# Patient Record
Sex: MALE | Race: WHITE | NOT HISPANIC OR LATINO | Employment: OTHER | URBAN - METROPOLITAN AREA
[De-identification: names, ages, dates, MRNs, and addresses within clinical notes are randomized per-mention and may not be internally consistent; named-entity substitution may affect disease eponyms.]

---

## 2018-02-20 ENCOUNTER — OFFICE VISIT (OUTPATIENT)
Dept: FAMILY MEDICINE | Facility: CLINIC | Age: 33
End: 2018-02-20

## 2018-02-20 DIAGNOSIS — L28.1 PRURIGO NODULARIS: ICD-10-CM

## 2018-02-20 DIAGNOSIS — L30.9 DERMATITIS: Primary | ICD-10-CM

## 2018-02-20 DIAGNOSIS — L29.9 PRURITIC DISORDER: ICD-10-CM

## 2018-02-20 LAB
BASOPHILS # BLD AUTO: 0 10E9/L (ref 0–0.2)
BASOPHILS NFR BLD AUTO: 0.2 %
DIFFERENTIAL METHOD BLD: ABNORMAL
EOSINOPHIL # BLD AUTO: 0.4 10E9/L (ref 0–0.7)
EOSINOPHIL NFR BLD AUTO: 4.4 %
ERYTHROCYTE [DISTWIDTH] IN BLOOD BY AUTOMATED COUNT: 11.9 % (ref 10–15)
ERYTHROCYTE [SEDIMENTATION RATE] IN BLOOD BY WESTERGREN METHOD: 8 MM/H (ref 0–15)
HCT VFR BLD AUTO: 37.7 % (ref 40–53)
HGB BLD-MCNC: 12.5 G/DL (ref 13.3–17.7)
LYMPHOCYTES # BLD AUTO: 2.6 10E9/L (ref 0.8–5.3)
LYMPHOCYTES NFR BLD AUTO: 31 %
MCH RBC QN AUTO: 29.6 PG (ref 26.5–33)
MCHC RBC AUTO-ENTMCNC: 33.2 G/DL (ref 31.5–36.5)
MCV RBC AUTO: 89 FL (ref 78–100)
MONOCYTES # BLD AUTO: 0.5 10E9/L (ref 0–1.3)
MONOCYTES NFR BLD AUTO: 6.3 %
NEUTROPHILS # BLD AUTO: 4.8 10E9/L (ref 1.6–8.3)
NEUTROPHILS NFR BLD AUTO: 58.1 %
PLATELET # BLD AUTO: 222 10E9/L (ref 150–450)
RBC # BLD AUTO: 4.22 10E12/L (ref 4.4–5.9)
WBC # BLD AUTO: 8.3 10E9/L (ref 4–11)

## 2018-02-20 PROCEDURE — 87186 SC STD MICRODIL/AGAR DIL: CPT | Performed by: FAMILY MEDICINE

## 2018-02-20 PROCEDURE — 11101 HC BIOPSY SKIN/SUBQ/MUC MEM, SINGLE LESION: CPT | Performed by: FAMILY MEDICINE

## 2018-02-20 PROCEDURE — 99214 OFFICE O/P EST MOD 30 MIN: CPT | Mod: 25 | Performed by: FAMILY MEDICINE

## 2018-02-20 PROCEDURE — 87070 CULTURE OTHR SPECIMN AEROBIC: CPT | Performed by: FAMILY MEDICINE

## 2018-02-20 PROCEDURE — 86140 C-REACTIVE PROTEIN: CPT | Performed by: FAMILY MEDICINE

## 2018-02-20 PROCEDURE — 99000 SPECIMEN HANDLING OFFICE-LAB: CPT | Performed by: FAMILY MEDICINE

## 2018-02-20 PROCEDURE — 36415 COLL VENOUS BLD VENIPUNCTURE: CPT | Performed by: FAMILY MEDICINE

## 2018-02-20 PROCEDURE — 80050 GENERAL HEALTH PANEL: CPT | Performed by: FAMILY MEDICINE

## 2018-02-20 PROCEDURE — 85652 RBC SED RATE AUTOMATED: CPT | Performed by: FAMILY MEDICINE

## 2018-02-20 PROCEDURE — 87077 CULTURE AEROBIC IDENTIFY: CPT | Performed by: FAMILY MEDICINE

## 2018-02-20 PROCEDURE — 11100 HC BIOPSY SKIN/SUBQ/MUC MEM, EACH ADDTL LESION: CPT | Performed by: FAMILY MEDICINE

## 2018-02-20 PROCEDURE — 88305 TISSUE EXAM BY PATHOLOGIST: CPT | Mod: 90 | Performed by: FAMILY MEDICINE

## 2018-02-20 PROCEDURE — 86038 ANTINUCLEAR ANTIBODIES: CPT | Performed by: FAMILY MEDICINE

## 2018-02-20 RX ORDER — GABAPENTIN 300 MG/1
300 CAPSULE ORAL 3 TIMES DAILY
Status: CANCELLED | OUTPATIENT
Start: 2018-02-20

## 2018-02-20 RX ORDER — TRIAMCINOLONE ACETONIDE 1 MG/G
CREAM TOPICAL 2 TIMES DAILY
Qty: 80 G | Refills: 1 | Status: CANCELLED | OUTPATIENT
Start: 2018-02-20

## 2018-02-20 RX ORDER — PREDNISONE 20 MG/1
TABLET ORAL
Qty: 15 TABLET | Refills: 0 | Status: ON HOLD | OUTPATIENT
Start: 2018-02-20 | End: 2021-09-24

## 2018-02-20 RX ORDER — DOXYCYCLINE 100 MG/1
100 CAPSULE ORAL 2 TIMES DAILY
Qty: 60 CAPSULE | Refills: 0 | Status: SHIPPED | OUTPATIENT
Start: 2018-02-20 | End: 2018-03-22

## 2018-02-20 NOTE — PROGRESS NOTES
"[facility] - PRIMARY CARE SKIN    CC : sores  SUBJECTIVE:                                                    Tate Crocker is a 33 year old male who presents to clinic today because of sores beginning approximately 1.5 years ago on the inner thighs, now most prominent on the face and hands. Sores appear as small pimples or whiteheads at onset. He is a difficult historian .    Sores began to develop on the face at the end of summer 2016. Sores almost fully resolved \"99%\" in fall 2016 with decrease in stress levels.    Sores recurred in summer 2017 and have persisted. He reports continued increased stress levels. After picking at lesions and application of pressure to pop the lesions, there is increased pain, redness and swelling.    Pruritic : No itching, but he has picked at the lesions. Picking is most prominent when he has not slept regularly (when \"lesions have become inflamed\") , he starts picking as soon as he feels a red bump - he has slept 2 hours in the last 2 days. He reports that he will be able to avoid picking at lesions.  Symptoms appear to be : worsening.  Aggravating factors : stress levels.   Relieving factors : decreased stress.    Previous similar history : see above.  Recent exposure history : none known   Any other family members with similar symptoms : NO - wife reports similar lesions when using the same towels, but she is not currently symptomatic.    Therapies tried : OTC hydrocortisone at onset. A course of oral antibiotics for 10 or 30 days in summer 2017.    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 .    There is no problem list on file for this patient.      History reviewed. No pertinent past medical history. History reviewed. No pertinent " surgical history.   Social History   Substance Use Topics     Smoking status: Current Every Day Smoker     Smokeless tobacco: Never Used     Alcohol use No               Allergies   Allergen Reactions     Metal [Staples]      Rash         INTEGUMENTARY/SKIN: POSITIVE for non-healing lesions    ROS : 14 point review of systems was negative except the symptoms listed above in the HPI.    This document serves as a record of the services and decisions personally performed and made by Opal Jameson MD. It was created on her behalf by Nito Phillips, a trained medical scribe.  The creation of this document is based on the scribe's personal observations and the provider's statements to the medical scribe.  Nito Phillips, February 20, 2018 4:36 PM      OBJECTIVE:                                                    GENERAL: healthy, alert and no distress  SKIN: Guzman Skin Type - III.  Face, Neck, Trunk, Arms, Legs, Hands and Fingers were examined. The dermatoscope was used to help evaluate pigmented lesions.  Skin Pertinent Findings:  Hands : Multiple excoriated papules from mid-forearm extending down to dorsa of hands, but not on palmar surfaces.    Arms : Few scattered resolving excoriated papules on the mid-arm, upper arms relatively clear.    Trunk : Few scattered scaly papules on periumbilical area and mid-chest  Back : Relatively clear.    Ears : Dried excoriated lesions on both helices.    Legs : Multiple scattered excoriated papules more prominent on anterior thighs and lower legs than posterior. Sparing buttocks.    Face : Makeup occludes visual inspection, but multiple areas of similar excoriated papules visible underneath makeup on the forehead, cheeks and chin.    Feet : Multiple excoriated lesions on the dorsa of feet.    Right radial dorsal wrist : Small white papule.    Diagnostic Test Results:  No results found for this or any previous visit (from the past 24 hour(s)).    MDM :  The amount of excoriation is a  "strong contributing factor to the persistence of the condition.      ASSESSMENT:                                                      Encounter Diagnoses   Name Primary?     Dermatitis Yes     Pruritic disorder      Prurigo nodularis        PLAN:                                                    Patient Instructions     FUTURE APPOINTMENTS  Follow up in 7-10 days for Suture Removal and Tissue Pathology Review with Gisell Cohen.    Avoid picking at or scratching affected areas.    ORAL ANTIBIOTIC  Take by mouth 1 capsule/tablet of doxycycline 100 mg two time(s) a day for 30 days. Make sure to finish the entire antibiotic regimen.    TETRACYCLINE ANTIBIOTIC INFORMATION  Minocycline and doxycycline are tetracycline antibiotics and can increase your sun sensitivity, so make sure to be vigilant in regularly applying sunscreen. Also, avoid taking these with dairy products, as calcium can bind the antibiotic, making it unavailable to your body.    PREDNISONE 20 mg tablet TAPER INSTRUCTIONS (oral steroid):  Take by mouth at the same time every day and with food to prevent stomach upset. If it helps to remember, record these instructions in a frequently visible place.    Scheduled Day # Dosage   1-3 40 mg = 2 tablets   4-6 30 mg = 1.5 tablets   7-9 20 mg = 1 tablet   10-12 10 mg = 0.5 tablets     TOPICAL STEROID INSTRUCTIONS  Triamcinolone 0.1% cream.    Apply an amount equal to half of a fingertip (0.25 g) to the affected area(s) on the arms, legs, hands and feet, two times per day for 2 weeks.    \"Fingertip Amount\"      Not to be used on face or groin.    Topical steroid use is for short-term treatment only. If after initial treatment, you are using this for prolonged periods of time, return to clinic for re-evaluation of treatment.    Keep in mind to also regularly use moisturizer, as this preventative measure can help maintain your skin's natural moisture barrier.    WOUND CARE INSTRUCTIONS  1. Wash hands before every " "dressing change.  2. After 24 hours, change dressing daily.  3. Wash the wound area with a mild soap, then rinse.  4. Gently pat dry with a sterile gauze or Q-tip.  5. Using a Q-tip, apply Vaseline or Aquaphor only over entire wound. Do NOT use Neosporin - as many people react to neomycin.  6. Finally, cover with a bandage or sterile non-stick gauze with micropore paper tape.  7. Repeat once daily until wound has healed.    Do not let the wound dry out.  The wound will heal faster and with better cosmetic results if routinely kept moist. It is a false belief that wounds heal better when exposed to air and allowed to dry out.      Soap, water and shampoo will not hurt this area.    Do not go swimming or take baths, but showering is encouraged.    Limit use of the area where the procedure was done for a few days to allow for optimal healing.    If you experience bleeding:  Wash hands and hold firm pressure on the area for 10 minutes without checking to see if the bleeding has stopped. \"Checking\" pulls off the protective wound clot and restarts the bleeding all over again. Re-apply pressure for 10 minutes if necessary to stop bleeding.  Use additional sterile gauze and tape to maintain pressure once bleeding has stopped.  If bleeding continues, then call back to clinic at [phone].    Signs of Infection:  Infection can occur in any area where skin has been disrupted.  If you notice persistent redness, swelling, colored drainage, increasing pain, fever or other signs of infection, please call us at: [phone] and ask to have me or my colleague paged. We will call you back to discuss.    Pathology Results:  You will be notified, generally via letter or MyChart, in approximately 10 days. If there is anything we need to discuss or further treatment needed, I will call you to discuss it.    Skin tissue can be some of the most challenging tissue for pathologists to examine, therefore we may use a specialist " outside the [facility] system to read certain submitted tissue samples. When submitted to these outside specialists, [employer] will notify you that your tissue sample result has returned. However, this only means that the tissue sample has been sent to the specialist. These results are not available in [employer], so I will contact you when I receive the final report.    PATIENT INFORMATION : WOUNDS  During the healing process you will notice a number of changes. All wounds develop a small halo of redness surrounding the wound.  This means healing is occurring. Severe itching with extensive redness usually indicates sensitivity to the ointment or bandage tape used to dress the wound.  You should call our office if this develops.      Swelling  and/or discoloration around your surgical site is common, particularly when performed around the eye.    All wounds normally drain.  The larger the wound the more drainage there will be.  After 7-10 days, you will notice the wound beginning to shrink and new skin will begin to grow.  The wound is healed when you can see skin has formed over the entire area.  A healed wound has a healthy, shiny look to the surface and is red to dark pink in color to normalize.  Wounds may take approximately 4-6 weeks to heal.  Larger wounds may take 6-8 weeks. After the wound is healed you may discontinue dressing changes.    You may experience a sensation of tightness as your wound heals. This is normal and will gradually subside.    Your healed wound may be sensitive to temperature changes. This sensitivity improves with time, but if you re having a lot of discomfort, try to avoid temperature extremes.    Patients frequently experience itching after their wound appears to have healed because of the continue healing under the skin.  Plain Vaseline will help relieve the itching.    DRY SKIN MANAGEMENT INSTRUCTIONS  Routine use of moisturizer is important for healthy, resilient skin not just for soft  "skin.     Sealing in moisture    Twice daily use of a moisturizer such as over-the-counter (OTC) CeraVe moisturizer cream (in the jar). CeraVe products contain ceramides and filaggrin proteins that can help to maintain the body's moisture layer.    After cleansing or washing, always apply moisturizer immediately after drying off (pat dry only) for best effect.    Protection while hydrating    Do not overuse soap. Unless you have been sweating extensively, just apply soap to groin and armpits.    Recommended products for body include: OTC unscented Dove for sensitive skin or OTC Vanicream cleansing bar.    Recommended facial cleansers include: OTC CeraVe hydrating facial cleanser or OTC Cetaphil daily facial cleanser.    Avoid use of    Scented/perfumed products    Irritating clothing (wool, new jeans, new/unwashed clothing, scratchy synthetics)    Neosporin or triple antibiotic topical products    Products containing aloe, herbs, Vitamin E, or other \"natural ingredients\".    Dryer sheets or fabric softeners (while symptoms are present)    If a topical medication is prescribed, apply topical prescription first, followed by use of moisturizing product.    The patient was counseled to use products free of fragrance, dyes, and plants. The importance of using bland cleansers and the regular use of heavy bland emollient creams was impressed upon the patient.      PROCEDURES:                                                    Name : Punch Biopsy  Indication : Biopsy for pathology to evaluate tissue.  Location(s) : right radial dorsal forearm.  Completed by : Opal Jameson MD  Photo Taken : no.  Anesthesia : Patient was anesthetized by infiltrating the area surrounding the lesion with 1% lidocaine.   epinephrine [id number]:[id number] : Yes.  Buffered with bicarbonate : Yes.  Note : Discussed the risk of pain, infection, scarring, hypo- or hyperpigmentation and recurrence or need for re-treatment. The benefits of treatment and " alternative treatments were also discussed.    During this procedure, the universal protocol was utilized. The patient's identity was confirmed by no less than two patient identifiers, correct procedure was verified, correct site was verified and marked as applicable and a final pause was completed.    Sterile technique was used throughout the procedure. The skin was cleaned and prepped with surgical cleanser. Once adequate anesthesia was obtained, the lesion was biopsied using a 4 mm punch and appropriate skin traction. The specimen was sent to pathology.    Direct pressure was applied for hemostasis. The skin was closed with simple interrupted sutures of 4-0 Prolene. No bleeding was present upon the completion of the procedure. The wound was coated with antibacterial ointment. A dry sterile dressing was applied.    Name : Direct Immunofluorescence Pathology (DIF)  Note : A punch biopsy was performed with the technique above. A perilesional biopsy was performed using a 4 millimeter punch with appropriate skin traction. Direct pressure was applied for hemostasis. The specimen was sent to pathology. Both areas were incorporated into single defect.  Total number of stitches in closure of epidermis : 3    Primary provider and referring provider will be informed regarding the wound culture and tissue sample report when it returns.    Suture removal : 7-10 days.    CT: 40 min  TT: 25 min    The information in this document, created by the medical scribe for me, accurately reflects the services I personally performed and the decisions made by me. I have reviewed and approved this document for accuracy prior to leaving the patient care area.  Opal Jameson MD February 20, 2018 4:36 PM  [facility] [place]

## 2018-02-20 NOTE — MR AVS SNAPSHOT
"              After Visit Summary   2/20/2018    Tate Crocker    MRN: [id number]           Patient Information     Date Of Birth          1985        Visit Information        Provider Department      2/20/2018 4:20 PM Clarisa Jameson MD [facility] [place]        Today's Diagnoses     Dermatitis    -  1    Pruritic disorder        Prurigo nodularis          Care Instructions    FUTURE APPOINTMENTS  Follow up in 7-10 days for Suture Removal and Tissue Pathology Review with Gisell Cohen.    Avoid picking at or scratching affected areas.    ORAL ANTIBIOTIC  Take by mouth 1 capsule/tablet of doxycycline 100 mg two time(s) a day for 30 days. Make sure to finish the entire antibiotic regimen.    TETRACYCLINE ANTIBIOTIC INFORMATION  Minocycline and doxycycline are tetracycline antibiotics and can increase your sun sensitivity, so make sure to be vigilant in regularly applying sunscreen. Also, avoid taking these with dairy products, as calcium can bind the antibiotic, making it unavailable to your body.    PREDNISONE 20 mg tablet TAPER INSTRUCTIONS (oral steroid):  Take by mouth at the same time every day and with food to prevent stomach upset. If it helps to remember, record these instructions in a frequently visible place.    Scheduled Day # Dosage   1-3 40 mg = 2 tablets   4-6 30 mg = 1.5 tablets   7-9 20 mg = 1 tablet   10-12 10 mg = 0.5 tablets     TOPICAL STEROID INSTRUCTIONS  Triamcinolone 0.1% cream.    Apply an amount equal to half of a fingertip (0.25 g) to the affected area(s) on the arms, legs, hands and feet, two times per day for 2 weeks.    \"Fingertip Amount\"      Not to be used on face or groin.    Topical steroid use is for short-term treatment only. If after initial treatment, you are using this for prolonged periods of time, return to clinic for re-evaluation of treatment.    Keep in mind to also regularly use moisturizer, as this preventative measure can help maintain " "your skin's natural moisture barrier.    WOUND CARE INSTRUCTIONS  1. Wash hands before every dressing change.  2. After 24 hours, change dressing daily.  3. Wash the wound area with a mild soap, then rinse.  4. Gently pat dry with a sterile gauze or Q-tip.  5. Using a Q-tip, apply Vaseline or Aquaphor only over entire wound. Do NOT use Neosporin - as many people react to neomycin.  6. Finally, cover with a bandage or sterile non-stick gauze with micropore paper tape.  7. Repeat once daily until wound has healed.    Do not let the wound dry out.  The wound will heal faster and with better cosmetic results if routinely kept moist. It is a false belief that wounds heal better when exposed to air and allowed to dry out.      Soap, water and shampoo will not hurt this area.    Do not go swimming or take baths, but showering is encouraged.    Limit use of the area where the procedure was done for a few days to allow for optimal healing.    If you experience bleeding:  Wash hands and hold firm pressure on the area for 10 minutes without checking to see if the bleeding has stopped. \"Checking\" pulls off the protective wound clot and restarts the bleeding all over again. Re-apply pressure for 10 minutes if necessary to stop bleeding.  Use additional sterile gauze and tape to maintain pressure once bleeding has stopped.  If bleeding continues, then call back to clinic at [phone].    Signs of Infection:  Infection can occur in any area where skin has been disrupted.  If you notice persistent redness, swelling, colored drainage, increasing pain, fever or other signs of infection, please call us at: [phone] and ask to have me or my colleague paged. We will call you back to discuss.    Pathology Results:  You will be notified, generally via letter or MyChart, in approximately 10 days. If there is anything we need to discuss or further treatment needed, I will call you to discuss it.    Skin tissue can be some of the " most challenging tissue for pathologists to examine, therefore we may use a specialist outside the [facility] system to read certain submitted tissue samples. When submitted to these outside specialists, [facility] will notify you that your tissue sample result has returned. However, this only means that the tissue sample has been sent to the specialist. These results are not available in [facility], so I will contact you when I receive the final report.    PATIENT INFORMATION : WOUNDS  During the healing process you will notice a number of changes. All wounds develop a small halo of redness surrounding the wound.  This means healing is occurring. Severe itching with extensive redness usually indicates sensitivity to the ointment or bandage tape used to dress the wound.  You should call our office if this develops.      Swelling  and/or discoloration around your surgical site is common, particularly when performed around the eye.    All wounds normally drain.  The larger the wound the more drainage there will be.  After 7-10 days, you will notice the wound beginning to shrink and new skin will begin to grow.  The wound is healed when you can see skin has formed over the entire area.  A healed wound has a healthy, shiny look to the surface and is red to dark pink in color to normalize.  Wounds may take approximately 4-6 weeks to heal.  Larger wounds may take 6-8 weeks. After the wound is healed you may discontinue dressing changes.    You may experience a sensation of tightness as your wound heals. This is normal and will gradually subside.    Your healed wound may be sensitive to temperature changes. This sensitivity improves with time, but if you re having a lot of discomfort, try to avoid temperature extremes.    Patients frequently experience itching after their wound appears to have healed because of the continue healing under the skin.  Plain Vaseline will help relieve the itching.    DRY SKIN MANAGEMENT  "INSTRUCTIONS  Routine use of moisturizer is important for healthy, resilient skin not just for soft skin.     Sealing in moisture    Twice daily use of a moisturizer such as over-the-counter (OTC) CeraVe moisturizer cream (in the jar). CeraVe products contain ceramides and filaggrin proteins that can help to maintain the body's moisture layer.    After cleansing or washing, always apply moisturizer immediately after drying off (pat dry only) for best effect.    Protection while hydrating    Do not overuse soap. Unless you have been sweating extensively, just apply soap to groin and armpits.    Recommended products for body include: OTC unscented Dove for sensitive skin or OTC Vanicream cleansing bar.    Recommended facial cleansers include: OTC CeraVe hydrating facial cleanser or OTC Cetaphil daily facial cleanser.    Avoid use of    Scented/perfumed products    Irritating clothing (wool, new jeans, new/unwashed clothing, scratchy synthetics)    Neosporin or triple antibiotic topical products    Products containing aloe, herbs, Vitamin E, or other \"natural ingredients\".    Dryer sheets or fabric softeners (while symptoms are present)    If a topical medication is prescribed, apply topical prescription first, followed by use of moisturizing product.          Follow-ups after your visit        Who to contact     If you have questions or need follow up information about today's clinic visit or your schedule please contact [facility] [place] [facility] directly at [phone].  Normal or non-critical lab and imaging results will be communicated to you by MyChart, letter or phone within 4 business days after the clinic has received the results. If you do not hear from us within 7 days, please contact the clinic through [employer] [facility] or phone. If you have a critical or abnormal lab result, we will notify you by phone as soon as possible.  Submit refill requests through [employer] or call your pharmacy and they will forward the " "refill request to us. Please allow 3 business days for your refill to be completed.          Additional Information About Your Visit        MyChart Information     [employer] lets you send messages to your doctor, view your test results, renew your prescriptions, schedule appointments and more. To sign up, go to [email or web address] . Click on \"Log in\" on the left side of the screen, which will take you to the Welcome page. Then click on \"Sign up Now\" on the right side of the page.     You will be asked to enter the access code listed below, as well as some personal information. Please follow the directions to create your username and password.     Your access code is: [email or web address]  Expires: 2018  5:07 PM     Your access code will  in 90 days. If you need help or a new code, please call your [place] clinic or [phone].        Care EveryWhere ID     This is your Care EveryWhere ID. This could be used by other organizations to access your [place] medical records  [id number]         Blood Pressure from Last 3 Encounters:   No data found for BP    Weight from Last 3 Encounters:   No data found for Wt              Today, you had the following     No orders found for display       Primary Care Provider Fax #    Provider Not In System [phone]                Equal Access to Services     LISA PIZARRO : Hadii arron ku hadasho Soomaali, waaxda luqadaha, qaybta kaalmada adeegyada, hailey javier . So [facility] [phone].    ATENCIÓN: Si habla español, tiene a mccullough disposición servicios gratuitos de asistencia lingüística. Llame al [phone].    We comply with applicable federal civil rights laws and [place] laws. We do not discriminate on the basis of race, color, national origin, age, disability, sex, sexual orientation, or gender identity.            Thank you!     Thank you for choosing [facility]  for your care. Our goal is always to provide you with " excellent care. Hearing back from our patients is one way we can continue to improve our services. Please take a few minutes to complete the written survey that you may receive in the mail after your visit with us. Thank you!             Your Updated Medication List - Protect others around you: Learn how to safely use, store and throw away your medicines at www.disposemymeds.org.      Notice  As of 2/20/2018  5:07 PM    You have not been prescribed any medications.

## 2018-02-20 NOTE — LETTER
February 26, 2018    Tate Crocker  [id number]  [facility] [address]        Dear Tate,    This is a letter regarding your completed lab results. The tissue report is still pending. The wound culture did have a heavy growth of staph aureus and actinobacter therefore I have prescribed the oral antibiotic , trimethoprim sulfa. The trimethoprim sulfa has already been faxed to your preferred pharmacy.    Office Visit on 02/20/2018   Component Date Value Ref Range Status     WBC 02/20/2018 8.3  4.0 - 11.0 10e9/L Final     RBC Count 02/20/2018 4.22* 4.4 - 5.9 10e12/L Final     Hemoglobin 02/20/2018 12.5* 13.3 - 17.7 g/dL Final     Hematocrit 02/20/2018 37.7* 40.0 - 53.0 % Final     MCV 02/20/2018 89  78 - 100 fl Final     MCH 02/20/2018 29.6  26.5 - 33.0 pg Final     MCHC 02/20/2018 33.2  31.5 - 36.5 g/dL Final     RDW 02/20/2018 11.9  10.0 - 15.0 % Final     Platelet Count 02/20/2018 222  150 - 450 10e9/L Final     Diff Method 02/20/2018 Automated Method   Final     % Neutrophils 02/20/2018 58.1  % Final     % Lymphocytes 02/20/2018 31.0  % Final     % Monocytes 02/20/2018 6.3  % Final     % Eosinophils 02/20/2018 4.4  % Final     % Basophils 02/20/2018 0.2  % Final     Absolute Neutrophil 02/20/2018 4.8  1.6 - 8.3 10e9/L Final     Absolute Lymphocytes 02/20/2018 2.6  0.8 - 5.3 10e9/L Final     Absolute Monocytes 02/20/2018 0.5  0.0 - 1.3 10e9/L Final     Absolute Eosinophils 02/20/2018 0.4  0.0 - 0.7 10e9/L Final     Absolute Basophils 02/20/2018 0.0  0.0 - 0.2 10e9/L Final     Sodium 02/20/2018 134  133 - 144 mmol/L Final     Potassium 02/20/2018 3.8  3.4 - 5.3 mmol/L Final     Chloride 02/20/2018 100  94 - 109 mmol/L Final     Carbon Dioxide 02/20/2018 28  20 - 32 mmol/L Final     Anion Gap 02/20/2018 6  3 - 14 mmol/L Final     Glucose 02/20/2018 102* 70 - 99 mg/dL Final     Urea Nitrogen 02/20/2018 11  7 - 30 mg/dL Final     Creatinine 02/20/2018 0.95  0.66 - 1.25 mg/dL Final     GFR Estimate  02/20/2018 >90  >60 mL/min/1.7m2 Final     GFR Estimate If Black 02/20/2018 >90  >60 mL/min/1.7m2 Final     Calcium 02/20/2018 9.1  8.5 - 10.1 mg/dL Final     Bilirubin Total 02/20/2018 0.3  0.2 - 1.3 mg/dL Final     Albumin 02/20/2018 4.1  3.4 - 5.0 g/dL Final     Protein Total 02/20/2018 7.3  6.8 - 8.8 g/dL Final     Alkaline Phosphatase 02/20/2018 73  40 - 150 U/L Final     ALT 02/20/2018 26  0 - 70 U/L Final     AST 02/20/2018 24  0 - 45 U/L Final     Sed Rate 02/20/2018 8  0 - 15 mm/h Final     CRP Inflammation 02/20/2018 <2.9  0.0 - 8.0 mg/L Final     PAULETTE interpretation 02/20/2018 Negative  NEG^Negative Final     TSH 02/20/2018 1.84  0.40 - 4.00 mU/L Final     Specimen Description 02/20/2018 Right Arm   Final     Culture Micro 02/20/2018 *  Final                    Value:Heavy growth  Staphylococcus aureus       Culture Micro 02/20/2018 *  Final                    Value:Moderate growth  Acinetobacter species, not baumannii       Culture Micro 02/20/2018 *  Final                    Value:Moderate growth  Strain 2  Acinetobacter species, not baumannii       Miscellaneous Test 02/20/2018      Final                    Value:Specimen Received, Reordered and sent to Performing laboratory - Report to follow upon   completion.       Test Name 02/20/2018 DIRECT IMMUNOFLUORESCENCE   Final     Send Outs Misc Test Code 02/20/2018 DIF   Final     Send Outs Misc Test Specimen 02/20/2018 Tissue   Final     Location Performed 02/20/2018 [facility] DERMATOLOGY   Final     Result 02/20/2018 PENDING   Incomplete     Normal Range for Send Outs Misc Te* 02/20/2018 PENDING   Incomplete         Thank you for allowing me to be involved in your health care and for choosing [facility].  If you have any questions or concerns please feel free to contact me at [phone].      Sincerely,      Clarisa Jameson M.D.

## 2018-02-20 NOTE — NURSING NOTE
Chief Complaint   Patient presents with     New Patient     Derm Problem     Sores on hands, face, and thighs       Initial There were no vitals taken for this visit. There is no height or weight on file to calculate BMI.  Medication Reconciliation: complete     Sakshi Briggs MA

## 2018-02-20 NOTE — PATIENT INSTRUCTIONS
"FUTURE APPOINTMENTS  Follow up in 7-10 days for Suture Removal and Tissue Pathology Review with Gisell Cohen.    Avoid picking at or scratching affected areas.    ORAL ANTIBIOTIC  Take by mouth 1 capsule/tablet of doxycycline 100 mg two time(s) a day for 30 days. Make sure to finish the entire antibiotic regimen.    TETRACYCLINE ANTIBIOTIC INFORMATION  Minocycline and doxycycline are tetracycline antibiotics and can increase your sun sensitivity, so make sure to be vigilant in regularly applying sunscreen. Also, avoid taking these with dairy products, as calcium can bind the antibiotic, making it unavailable to your body.    PREDNISONE 20 mg tablet TAPER INSTRUCTIONS (oral steroid):  Take by mouth at the same time every day and with food to prevent stomach upset. If it helps to remember, record these instructions in a frequently visible place.    Scheduled Day # Dosage   1-3 40 mg = 2 tablets   4-6 30 mg = 1.5 tablets   7-9 20 mg = 1 tablet   10-12 10 mg = 0.5 tablets     TOPICAL STEROID INSTRUCTIONS  Triamcinolone 0.1% cream.    Apply an amount equal to half of a fingertip (0.25 g) to the affected area(s) on the arms, legs, hands and feet, two times per day for 2 weeks.    \"Fingertip Amount\"      Not to be used on face or groin.    Topical steroid use is for short-term treatment only. If after initial treatment, you are using this for prolonged periods of time, return to clinic for re-evaluation of treatment.    Keep in mind to also regularly use moisturizer, as this preventative measure can help maintain your skin's natural moisture barrier.    WOUND CARE INSTRUCTIONS  1. Wash hands before every dressing change.  2. After 24 hours, change dressing daily.  3. Wash the wound area with a mild soap, then rinse.  4. Gently pat dry with a sterile gauze or Q-tip.  5. Using a Q-tip, apply Vaseline or Aquaphor only over entire wound. Do NOT use Neosporin - as many people react to neomycin.  6. Finally, cover with a bandage " "or sterile non-stick gauze with micropore paper tape.  7. Repeat once daily until wound has healed.    Do not let the wound dry out.  The wound will heal faster and with better cosmetic results if routinely kept moist. It is a false belief that wounds heal better when exposed to air and allowed to dry out.      Soap, water and shampoo will not hurt this area.    Do not go swimming or take baths, but showering is encouraged.    Limit use of the area where the procedure was done for a few days to allow for optimal healing.    If you experience bleeding:  Wash hands and hold firm pressure on the area for 10 minutes without checking to see if the bleeding has stopped. \"Checking\" pulls off the protective wound clot and restarts the bleeding all over again. Re-apply pressure for 10 minutes if necessary to stop bleeding.  Use additional sterile gauze and tape to maintain pressure once bleeding has stopped.  If bleeding continues, then call back to clinic at [phone].    Signs of Infection:  Infection can occur in any area where skin has been disrupted.  If you notice persistent redness, swelling, colored drainage, increasing pain, fever or other signs of infection, please call us at: [phone] and ask to have me or my colleague paged. We will call you back to discuss.    Pathology Results:  You will be notified, generally via letter or [employer], in approximately 10 days. If there is anything we need to discuss or further treatment needed, I will call you to discuss it.    Skin tissue can be some of the most challenging tissue for pathologists to examine, therefore we may use a specialist outside the [facility] system to read certain submitted tissue samples. When submitted to these outside specialists, [employer] will notify you that your tissue sample result has returned. However, this only means that the tissue sample has been sent to the specialist. These results are not available in [employer], so I will contact you " when I receive the final report.    PATIENT INFORMATION : WOUNDS  During the healing process you will notice a number of changes. All wounds develop a small halo of redness surrounding the wound.  This means healing is occurring. Severe itching with extensive redness usually indicates sensitivity to the ointment or bandage tape used to dress the wound.  You should call our office if this develops.      Swelling  and/or discoloration around your surgical site is common, particularly when performed around the eye.    All wounds normally drain.  The larger the wound the more drainage there will be.  After 7-10 days, you will notice the wound beginning to shrink and new skin will begin to grow.  The wound is healed when you can see skin has formed over the entire area.  A healed wound has a healthy, shiny look to the surface and is red to dark pink in color to normalize.  Wounds may take approximately 4-6 weeks to heal.  Larger wounds may take 6-8 weeks. After the wound is healed you may discontinue dressing changes.    You may experience a sensation of tightness as your wound heals. This is normal and will gradually subside.    Your healed wound may be sensitive to temperature changes. This sensitivity improves with time, but if you re having a lot of discomfort, try to avoid temperature extremes.    Patients frequently experience itching after their wound appears to have healed because of the continue healing under the skin.  Plain Vaseline will help relieve the itching.    DRY SKIN MANAGEMENT INSTRUCTIONS  Routine use of moisturizer is important for healthy, resilient skin not just for soft skin.     Sealing in moisture    Twice daily use of a moisturizer such as over-the-counter (OTC) CeraVe moisturizer cream (in the jar). CeraVe products contain ceramides and filaggrin proteins that can help to maintain the body's moisture layer.    After cleansing or washing, always apply moisturizer immediately after drying off  "(pat dry only) for best effect.    Protection while hydrating    Do not overuse soap. Unless you have been sweating extensively, just apply soap to groin and armpits.    Recommended products for body include: OTC unscented Dove for sensitive skin or OTC Vanicream cleansing bar.    Recommended facial cleansers include: OTC CeraVe hydrating facial cleanser or OTC Cetaphil daily facial cleanser.    Avoid use of    Scented/perfumed products    Irritating clothing (wool, new jeans, new/unwashed clothing, scratchy synthetics)    Neosporin or triple antibiotic topical products    Products containing aloe, herbs, Vitamin E, or other \"natural ingredients\".    Dryer sheets or fabric softeners (while symptoms are present)    If a topical medication is prescribed, apply topical prescription first, followed by use of moisturizing product.  "

## 2018-02-20 NOTE — LETTER
"    2/20/2018         RE: Tate Crocker  [address]  [facility] [address]        Dear Colleague,    Thank you for referring your patient, Tate Crocker, to the [facility]. Please see a copy of my visit note below.    [facility] - PRIMARY CARE SKIN    CC : sores  SUBJECTIVE:                                                    Tate Crocker is a 33 year old male who presents to clinic today because of sores beginning approximately 1.5 years ago on the inner thighs, now most prominent on the face and hands. Sores appear as small pimples or whiteheads at onset. He is a difficult historian .    Sores began to develop on the face at the end of summer 2016. Sores almost fully resolved \"99%\" in fall 2016 with decrease in stress levels.    Sores recurred in summer 2017 and have persisted. He reports continued increased stress levels. After picking at lesions and application of pressure to pop the lesions, there is increased pain, redness and swelling.    Pruritic : No itching, but he has picked at the lesions. Picking is most prominent when he has not slept regularly (when \"lesions have become inflamed\") , he starts picking as soon as he feels a red bump - he has slept 2 hours in the last 2 days. He reports that he will be able to avoid picking at lesions.  Symptoms appear to be : worsening.  Aggravating factors : stress levels.   Relieving factors : decreased stress.    Previous similar history : see above.  Recent exposure history : none known   Any other family members with similar symptoms : NO - wife reports similar lesions when using the same towels, but she is not currently symptomatic.    Therapies tried : OTC hydrocortisone at onset. A course of oral antibiotics for 10 or 30 days in summer 2017.    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 .    There is no problem list on file for this patient.      History reviewed. No pertinent past medical history. History reviewed. No pertinent surgical history.   Social History   Substance Use Topics     Smoking status: Current Every Day Smoker     Smokeless tobacco: Never Used     Alcohol use No               Allergies   Allergen Reactions     Metal [Staples]      Rash         INTEGUMENTARY/SKIN: POSITIVE for non-healing lesions    ROS : 14 point review of systems was negative except the symptoms listed above in the HPI.    This document serves as a record of the services and decisions personally performed and made by Opal Jameson MD. It was created on her behalf by Nito Phillips, a trained medical scribe.  The creation of this document is based on the scribe's personal observations and the provider's statements to the medical scribe.  Nito Phillips, February 20, 2018 4:36 PM      OBJECTIVE:                                                    GENERAL: healthy, alert and no distress  SKIN: Guzman Skin Type - III.  Face, Neck, Trunk, Arms, Legs, Hands and Fingers were examined. The dermatoscope was used to help evaluate pigmented lesions.  Skin Pertinent Findings:  Hands : Multiple excoriated papules from mid-forearm extending down to dorsa of hands, but not on palmar surfaces.    Arms : Few scattered resolving excoriated papules on the mid-arm, upper arms relatively clear.    Trunk : Few scattered scaly papules on periumbilical area and mid-chest  Back : Relatively clear.    Ears : Dried excoriated lesions on both helices.    Legs : Multiple scattered excoriated papules more prominent on anterior thighs and lower legs than posterior. Sparing buttocks.    Face : Makeup occludes visual inspection, but multiple areas of similar excoriated papules visible underneath makeup on the forehead, cheeks and  "chin.    Feet : Multiple excoriated lesions on the dorsa of feet.    Right radial dorsal wrist : Small white papule.    Diagnostic Test Results:  No results found for this or any previous visit (from the past 24 hour(s)).    MDM :  The amount of excoriation is a strong contributing factor to the persistence of the condition.      ASSESSMENT:                                                      Encounter Diagnoses   Name Primary?     Dermatitis Yes     Pruritic disorder      Prurigo nodularis        PLAN:                                                    Patient Instructions     FUTURE APPOINTMENTS  Follow up in 7-10 days for Suture Removal and Tissue Pathology Review with Gisell Cohen.    Avoid picking at or scratching affected areas.    ORAL ANTIBIOTIC  Take by mouth 1 capsule/tablet of doxycycline 100 mg two time(s) a day for 30 days. Make sure to finish the entire antibiotic regimen.    TETRACYCLINE ANTIBIOTIC INFORMATION  Minocycline and doxycycline are tetracycline antibiotics and can increase your sun sensitivity, so make sure to be vigilant in regularly applying sunscreen. Also, avoid taking these with dairy products, as calcium can bind the antibiotic, making it unavailable to your body.    PREDNISONE 20 mg tablet TAPER INSTRUCTIONS (oral steroid):  Take by mouth at the same time every day and with food to prevent stomach upset. If it helps to remember, record these instructions in a frequently visible place.    Scheduled Day # Dosage   1-3 40 mg = 2 tablets   4-6 30 mg = 1.5 tablets   7-9 20 mg = 1 tablet   10-12 10 mg = 0.5 tablets     TOPICAL STEROID INSTRUCTIONS  Triamcinolone 0.1% cream.    Apply an amount equal to half of a fingertip (0.25 g) to the affected area(s) on the arms, legs, hands and feet, two times per day for 2 weeks.    \"Fingertip Amount\"      Not to be used on face or groin.    Topical steroid use is for short-term treatment only. If after initial treatment, you are using this for " "prolonged periods of time, return to clinic for re-evaluation of treatment.    Keep in mind to also regularly use moisturizer, as this preventative measure can help maintain your skin's natural moisture barrier.    WOUND CARE INSTRUCTIONS  1. Wash hands before every dressing change.  2. After 24 hours, change dressing daily.  3. Wash the wound area with a mild soap, then rinse.  4. Gently pat dry with a sterile gauze or Q-tip.  5. Using a Q-tip, apply Vaseline or Aquaphor only over entire wound. Do NOT use Neosporin - as many people react to neomycin.  6. Finally, cover with a bandage or sterile non-stick gauze with micropore paper tape.  7. Repeat once daily until wound has healed.    Do not let the wound dry out.  The wound will heal faster and with better cosmetic results if routinely kept moist. It is a false belief that wounds heal better when exposed to air and allowed to dry out.      Soap, water and shampoo will not hurt this area.    Do not go swimming or take baths, but showering is encouraged.    Limit use of the area where the procedure was done for a few days to allow for optimal healing.    If you experience bleeding:  Wash hands and hold firm pressure on the area for 10 minutes without checking to see if the bleeding has stopped. \"Checking\" pulls off the protective wound clot and restarts the bleeding all over again. Re-apply pressure for 10 minutes if necessary to stop bleeding.  Use additional sterile gauze and tape to maintain pressure once bleeding has stopped.  If bleeding continues, then call back to clinic at [phone].    Signs of Infection:  Infection can occur in any area where skin has been disrupted.  If you notice persistent redness, swelling, colored drainage, increasing pain, fever or other signs of infection, please call us at: [phone] and ask to have me or my colleague paged. We will call you back to discuss.    Pathology Results:  You will be notified, generally via " letter or MyChart, in approximately 10 days. If there is anything we need to discuss or further treatment needed, I will call you to discuss it.    Skin tissue can be some of the most challenging tissue for pathologists to examine, therefore we may use a specialist outside the [facility] system to read certain submitted tissue samples. When submitted to these outside specialists, [facility] will notify you that your tissue sample result has returned. However, this only means that the tissue sample has been sent to the specialist. These results are not available in [facility], so I will contact you when I receive the final report.    PATIENT INFORMATION : WOUNDS  During the healing process you will notice a number of changes. All wounds develop a small halo of redness surrounding the wound.  This means healing is occurring. Severe itching with extensive redness usually indicates sensitivity to the ointment or bandage tape used to dress the wound.  You should call our office if this develops.      Swelling  and/or discoloration around your surgical site is common, particularly when performed around the eye.    All wounds normally drain.  The larger the wound the more drainage there will be.  After 7-10 days, you will notice the wound beginning to shrink and new skin will begin to grow.  The wound is healed when you can see skin has formed over the entire area.  A healed wound has a healthy, shiny look to the surface and is red to dark pink in color to normalize.  Wounds may take approximately 4-6 weeks to heal.  Larger wounds may take 6-8 weeks. After the wound is healed you may discontinue dressing changes.    You may experience a sensation of tightness as your wound heals. This is normal and will gradually subside.    Your healed wound may be sensitive to temperature changes. This sensitivity improves with time, but if you re having a lot of discomfort, try to avoid temperature extremes.    Patients frequently experience  "itching after their wound appears to have healed because of the continue healing under the skin.  Plain Vaseline will help relieve the itching.    DRY SKIN MANAGEMENT INSTRUCTIONS  Routine use of moisturizer is important for healthy, resilient skin not just for soft skin.     Sealing in moisture    Twice daily use of a moisturizer such as over-the-counter (OTC) CeraVe moisturizer cream (in the jar). CeraVe products contain ceramides and filaggrin proteins that can help to maintain the body's moisture layer.    After cleansing or washing, always apply moisturizer immediately after drying off (pat dry only) for best effect.    Protection while hydrating    Do not overuse soap. Unless you have been sweating extensively, just apply soap to groin and armpits.    Recommended products for body include: OTC unscented Dove for sensitive skin or OTC Vanicream cleansing bar.    Recommended facial cleansers include: OTC CeraVe hydrating facial cleanser or OTC Cetaphil daily facial cleanser.    Avoid use of    Scented/perfumed products    Irritating clothing (wool, new jeans, new/unwashed clothing, scratchy synthetics)    Neosporin or triple antibiotic topical products    Products containing aloe, herbs, Vitamin E, or other \"natural ingredients\".    Dryer sheets or fabric softeners (while symptoms are present)    If a topical medication is prescribed, apply topical prescription first, followed by use of moisturizing product.    The patient was counseled to use products free of fragrance, dyes, and plants. The importance of using bland cleansers and the regular use of heavy bland emollient creams was impressed upon the patient.      PROCEDURES:                                                    Name : Punch Biopsy  Indication : Biopsy for pathology to evaluate tissue.  Location(s) : right radial dorsal forearm.  Completed by : Opal Jameson MD  Photo Taken : no.  Anesthesia : Patient was anesthetized by infiltrating the area " surrounding the lesion with 1% lidocaine.   epinephrine [id number] : Yes.  Buffered with bicarbonate : Yes.  Note : Discussed the risk of pain, infection, scarring, hypo- or hyperpigmentation and recurrence or need for re-treatment. The benefits of treatment and alternative treatments were also discussed.    During this procedure, the universal protocol was utilized. The patient's identity was confirmed by no less than two patient identifiers, correct procedure was verified, correct site was verified and marked as applicable and a final pause was completed.    Sterile technique was used throughout the procedure. The skin was cleaned and prepped with surgical cleanser. Once adequate anesthesia was obtained, the lesion was biopsied using a 4 mm punch and appropriate skin traction. The specimen was sent to pathology.    Direct pressure was applied for hemostasis. The skin was closed with simple interrupted sutures of 4-0 Prolene. No bleeding was present upon the completion of the procedure. The wound was coated with antibacterial ointment. A dry sterile dressing was applied.    Name : Direct Immunofluorescence Pathology (DIF)  Note : A punch biopsy was performed with the technique above. A perilesional biopsy was performed using a 4 millimeter punch with appropriate skin traction. Direct pressure was applied for hemostasis. The specimen was sent to pathology. Both areas were incorporated into single defect.  Total number of stitches in closure of epidermis : 3    Primary provider and referring provider will be informed regarding the wound culture and tissue sample report when it returns.    Suture removal : 7-10 days.    CT: 40 min  TT: 25 min    The information in this document, created by the medical scribe for me, accurately reflects the services I personally performed and the decisions made by me. I have reviewed and approved this document for accuracy prior to leaving the patient care area.  Opal Jameson MD  February 20, 2018 4:36 PM  [facility] [place], thank you for allowing me to participate in the care of your patient.        Sincerely,        Clarisa Jameson MD

## 2018-02-21 LAB
ALBUMIN SERPL-MCNC: 4.1 G/DL (ref 3.4–5)
ALP SERPL-CCNC: 73 U/L (ref 40–150)
ALT SERPL W P-5'-P-CCNC: 26 U/L (ref 0–70)
ANION GAP SERPL CALCULATED.3IONS-SCNC: 6 MMOL/L (ref 3–14)
AST SERPL W P-5'-P-CCNC: 24 U/L (ref 0–45)
BILIRUB SERPL-MCNC: 0.3 MG/DL (ref 0.2–1.3)
BUN SERPL-MCNC: 11 MG/DL (ref 7–30)
CALCIUM SERPL-MCNC: 9.1 MG/DL (ref 8.5–10.1)
CHLORIDE SERPL-SCNC: 100 MMOL/L (ref 94–109)
CO2 SERPL-SCNC: 28 MMOL/L (ref 20–32)
CREAT SERPL-MCNC: 0.95 MG/DL (ref 0.66–1.25)
CRP SERPL-MCNC: <2.9 MG/L (ref 0–8)
GFR SERPL CREATININE-BSD FRML MDRD: >90 ML/MIN/1.7M2
GLUCOSE SERPL-MCNC: 102 MG/DL (ref 70–99)
LOCATION PERFORMED: NORMAL
MISCELLANEOUS TEST: NORMAL
NORMAL RANGE FOR SEND OUTS MISC TEST: NORMAL
POTASSIUM SERPL-SCNC: 3.8 MMOL/L (ref 3.4–5.3)
PROT SERPL-MCNC: 7.3 G/DL (ref 6.8–8.8)
RESULT: NORMAL
SEND OUTS MISC TEST CODE: NORMAL
SEND OUTS MISC TEST SPECIMEN: NORMAL
SODIUM SERPL-SCNC: 134 MMOL/L (ref 133–144)
TEST NAME: NORMAL
TSH SERPL DL<=0.005 MIU/L-ACNC: 1.84 MU/L (ref 0.4–4)

## 2018-02-22 LAB — ANA SER QL IF: NEGATIVE

## 2018-02-25 LAB
BACTERIA SPEC CULT: ABNORMAL
SPECIMEN SOURCE: ABNORMAL

## 2018-02-26 DIAGNOSIS — L73.9 FOLLICULITIS: Primary | ICD-10-CM

## 2018-02-26 RX ORDER — SULFAMETHOXAZOLE/TRIMETHOPRIM 800-160 MG
1 TABLET ORAL 2 TIMES DAILY
Qty: 20 TABLET | Refills: 0 | Status: SHIPPED | OUTPATIENT
Start: 2018-02-26 | End: 2018-03-08

## 2018-02-28 ENCOUNTER — OFFICE VISIT (OUTPATIENT)
Dept: FAMILY MEDICINE | Facility: CLINIC | Age: 33
End: 2018-02-28

## 2018-02-28 DIAGNOSIS — L08.89 SECONDARY INFECTION OF SKIN: ICD-10-CM

## 2018-02-28 DIAGNOSIS — L28.1 PRURIGO NODULARIS: Primary | ICD-10-CM

## 2018-02-28 PROCEDURE — 99213 OFFICE O/P EST LOW 20 MIN: CPT | Performed by: FAMILY MEDICINE

## 2018-02-28 NOTE — PROGRESS NOTES
"[facility] - PRIMARY CARE SKIN    CC : sores  SUBJECTIVE:                                                    Tate Crocker is a 33 year old male who presents to clinic today with wife for follow-up of a chronic issue of sores on skin. This issue first began approximately 1.5 years ago on the inner thighs, before spreading to the face and hands in summer 2016 before resolving in the fall/winter of 2016. He is a difficult historian.    Recall that he admits to being unable to stop picking at lesions and may be aggravated by increased stress. 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 He takes Excedrin and caffeine to stay awake, reporting that \"Excedrin is my friend\". Stress and sweating have provoked or aggravated the urge to pick at skin.    Therapies tried : He has not taken any of the previously prescribed therapies due to reluctance to use antibiotics. Prednisone also not taken. He denies picking at lesions anymore. He denies any itchiness at any time.    Lesions on the arm have \"dried out\" when he has not scratched in the last 1 week by covering the arm with occlusion.    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 .    There is no problem list on file for this patient.      History reviewed. No pertinent past medical history. History reviewed. No pertinent surgical history.   Social History   Substance Use Topics     Smoking status: Current Every Day Smoker     Smokeless tobacco: Never Used     Alcohol use No         Prescription Medications as of 2/28/2018             sulfamethoxazole-trimethoprim (BACTRIM DS/SEPTRA DS) 800-160 " MG per tablet Take 1 tablet by mouth 2 times daily for 10 days    doxycycline monohydrate 100 MG capsule Take 1 capsule (100 mg) by mouth 2 times daily    predniSONE (DELTASONE) 20 MG tablet 2 tabs po q day times 3 days , 1.5 tabs po q day times 3 days, 1 tab po q day times 3 days 1/2 tab q day times 3 days            Allergies   Allergen Reactions     Metal [Staples]      Rash         INTEGUMENTARY/SKIN: POSITIVE for non-healing lesions    ROS : 14 point review of systems was negative except the symptoms listed above in the HPI.    This document serves as a record of the services and decisions personally performed and made by Opal Jameson MD. It was created on her behalf by Nito Phillips, a trained medical scribe.  The creation of this document is based on the scribe's personal observations and the provider's statements to the medical scribe.  Nito Phillips, February 28, 2018 4:42 PM      OBJECTIVE:                                                    GENERAL: healthy, alert and no distress  SKIN: Guzman Skin Type - III.  Face, Neck, Trunk, Arms, Legs, Hands and Fingers were examined. The dermatoscope was used to help evaluate pigmented lesions.  Skin Pertinent Findings:  No change in findings. Affected areas are mostly confined to areas of range of motion with complete clearance of lower back.    Diagnostic Test Results:  No results found for this or any previous visit (from the past 24 hour(s)).    MDM : The amount of excoriation is a strong contributing factor to the persistence of the condition.      ASSESSMENT:                                                      Encounter Diagnoses   Name Primary?     Prurigo nodularis Yes     Secondary infection of skin          PLAN:                                                    Patient Instructions   FUTURE APPOINTMENTS  Follow up as needed if symptoms are not resolving.    Avoid picking at affected areas on skin. Increased use of caffeine or other stimulants to stay  "awake may be contributing to the urge to pick.    ORAL ANTIBIOTIC  Take by mouth 1 capsule/tablet of doxycycline 100 mg two time(s) a day for 30 days. Make sure to finish the entire antibiotic regimen.    Regular use of moisturizer can help diminish the urge to pick.    DRY SKIN MANAGEMENT INSTRUCTIONS  Routine use of moisturizer is important for healthy, resilient skin not just for soft skin.     Sealing in moisture    Twice daily use of a moisturizer such as over-the-counter (OTC) CeraVe moisturizer cream (in the jar). CeraVe products contain ceramides and filaggrin proteins that can help to maintain the body's moisture layer.    After cleansing or washing, always apply moisturizer immediately after drying off (pat dry only) for best effect.    Protection while hydrating    Do not overuse soap. Unless you have been sweating extensively, just apply soap to groin and armpits.    Recommended products for body include: OTC unscented Dove for sensitive skin or OTC Vanicream cleansing bar.    Recommended facial cleansers include: OTC CeraVe hydrating facial cleanser or OTC Cetaphil daily facial cleanser.    Always try to wear rubber gloves when washing dishes or cleaning.    Avoid use of    Scented/perfumed products    Irritating clothing (wool, new jeans, new/unwashed clothing, scratchy synthetics)    Neosporin or triple antibiotic topical products    Products containing aloe, herbs, Vitamin E, or other \"natural ingredients\".    Dryer sheets or fabric softeners (while symptoms are present)    Sun damage will exacerbate scarring.    SUN PROTECTION INSTRUCTIONS  Sun damage can lead to skin cancer and premature aging of the skin.       The best way to protect from sun damage to your skin is to avoid the sun during peak hours (10 am - 2 pm) even on overcast days.      Use UPF sun-protective clothing, which while more expensive initially provides longer lasting coverage without having to worry about remembering to " "re-apply.  1. Wear a wide-brimmed hat and sunglasses.   2. Wear sun-protective clothing.  [employer] and other [employer] make sun protective clothing that are stylish, comfortable and cool. [employer] and other [employer] make UV arm sleeves suitable for golfing, gardening and other activities.      Sunscreen instructions:  1. Use sunscreens with Zinc Oxide, Titanium Dioxide or Avobenzone to protect from UVA rays.  2. Use SPF 30-50+ to protect from UVB rays.  3. Re-apply every 2 hours even if water resistant.  4. Apply on your face every day even when cloudy and even in the winter. UVA \"aging rays\" penetrate window glass and are just as strong in the winter as in the summer.    Product Recommendations:    Good examples include: Blue Lizard, EltaMD, Solbar    Good daily moisturizers with SPF: Vanicream, CeraVe.    For sensitive skin, consider : SkinMedica Essential Defense Mineral Shield Broad Spectrum SPF 35      Never use tanning beds. Tanning beds are associated with much higher risks of skin cancer.    All tanning damages the skin. Aim for ivory skin year round and you will have less trouble with your skin in years to come. There is no merit in getting \"a base tan\" before a warm weather vacation, as any tanning indicates your body's response to sun damage.    Stop smoking. Smokers have higher rates of skin cancer and also have premature skin wrinkling.    FYI  You should use about 3 tablespoons of sunscreen to protect your whole body. Thus a typical eight ounce bottle of sunscreen should last 4 applications. Remember, that the SPF rating is compromised if you don t apply enough. Most people only apply 1/2 - 1/3 of the amount they need. Also don t forget areas such as your ears, feet, upper back and harder to reach places. Keep in mind that these amounts should be increased for larger body sizes.    Sunscreens with titanium dioxide and/or zinc oxide in the active ingredients are physical blockers as " opposed to chemical blockers. Chemical-free sunscreens should not irritate the skin.    Spray-on sunscreens may be used for touch-up application only, not as a base layer. Also, use with caution around small children due to inhalation risk.    Avoid retinyl palmitate products.    Avoid combination products that include both sunscreen and insect repellant, as sunscreen should be applied every 2 hours, but insect repellant should not be applied as frequently.    SPF means sun protection factor, which is just the degree to which the sunscreen can protect against UVB rays. There is no rating system for UVA rays. SPF is calculated as the time skin will burn when sunscreen is applied vs. skin without sunscreen.    Water resistant sunscreens should be re-applied every 1-2 hours.    For more information:  http://www.skincancer.org/prevention/sun-protection/sunscreen/sunscreens-safe-and-effective    The patient was counseled to stop picking. He was counseled to use products free of fragrance, dyes, and plants. The importance of using bland cleansers and the regular use of heavy bland emollient creams was impressed upon the patient.      PROCEDURES:                                                    None.    CT: 30 min  TT: 25 min      The information in this document, created by the medical scribe for me, accurately reflects the services I personally performed and the decisions made by me. I have reviewed and approved this document for accuracy prior to leaving the patient care area.  Opal Jameson MD February 28, 2018 4:37 PM  [facility] [place]

## 2018-02-28 NOTE — LETTER
"    2/28/2018         RE: Tate Crocker  [address]  [facility] [address]        Dear Colleague,    Thank you for referring your patient, Tate Crocker, to the [facility] [place]. Please see a copy of my visit note below.    [facility] - PRIMARY CARE SKIN    CC : sores  SUBJECTIVE:                                                    Tate Crocker is a 33 year old male who presents to clinic today with wife for follow-up of a chronic issue of sores on skin. This issue first began approximately 1.5 years ago on the inner thighs, before spreading to the face and hands in summer 2016 before resolving in the fall/winter of 2016. He is a difficult historian.    Recall that he admits to being unable to stop picking at lesions and may be aggravated by increased stress. 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 He takes Excedrin and caffeine to stay awake, reporting that \"Excedrin is my friend\". Stress and sweating have provoked or aggravated the urge to pick at skin.    Therapies tried : He has not taken any of the previously prescribed therapies due to reluctance to use antibiotics. Prednisone also not taken. He denies picking at lesions anymore. He denies any itchiness at any time.    Lesions on the arm have \"dried out\" when he has not scratched in the last 1 week by covering the arm with occlusion.    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 .    There is no problem list on file for this patient.      History reviewed. No pertinent past medical history. History reviewed. No pertinent surgical history. "   Social History   Substance Use Topics     Smoking status: Current Every Day Smoker     Smokeless tobacco: Never Used     Alcohol use No         Prescription Medications as of 2/28/2018             sulfamethoxazole-trimethoprim (BACTRIM DS/SEPTRA DS) 800-160 MG per tablet Take 1 tablet by mouth 2 times daily for 10 days    doxycycline monohydrate 100 MG capsule Take 1 capsule (100 mg) by mouth 2 times daily    predniSONE (DELTASONE) 20 MG tablet 2 tabs po q day times 3 days , 1.5 tabs po q day times 3 days, 1 tab po q day times 3 days 1/2 tab q day times 3 days            Allergies   Allergen Reactions     Metal [Staples]      Rash         INTEGUMENTARY/SKIN: POSITIVE for non-healing lesions    ROS : 14 point review of systems was negative except the symptoms listed above in the HPI.    This document serves as a record of the services and decisions personally performed and made by Opal Jameson MD. It was created on her behalf by Nito Phillips, a trained medical scribe.  The creation of this document is based on the scribe's personal observations and the provider's statements to the medical scribe.  Nito Phillips, February 28, 2018 4:42 PM      OBJECTIVE:                                                    GENERAL: healthy, alert and no distress  SKIN: Guzman Skin Type - III.  Face, Neck, Trunk, Arms, Legs, Hands and Fingers were examined. The dermatoscope was used to help evaluate pigmented lesions.  Skin Pertinent Findings:  No change in findings. Affected areas are mostly confined to areas of range of motion with complete clearance of lower back.    Diagnostic Test Results:  No results found for this or any previous visit (from the past 24 hour(s)).    MDM : The amount of excoriation is a strong contributing factor to the persistence of the condition.      ASSESSMENT:                                                      Encounter Diagnoses   Name Primary?     Prurigo nodularis Yes     Secondary infection of skin   "        PLAN:                                                    Patient Instructions   FUTURE APPOINTMENTS  Follow up as needed if symptoms are not resolving.    Avoid picking at affected areas on skin. Increased use of caffeine or other stimulants to stay awake may be contributing to the urge to pick.    ORAL ANTIBIOTIC  Take by mouth 1 capsule/tablet of doxycycline 100 mg two time(s) a day for 30 days. Make sure to finish the entire antibiotic regimen.    Regular use of moisturizer can help diminish the urge to pick.    DRY SKIN MANAGEMENT INSTRUCTIONS  Routine use of moisturizer is important for healthy, resilient skin not just for soft skin.     Sealing in moisture    Twice daily use of a moisturizer such as over-the-counter (OTC) CeraVe moisturizer cream (in the jar). CeraVe products contain ceramides and filaggrin proteins that can help to maintain the body's moisture layer.    After cleansing or washing, always apply moisturizer immediately after drying off (pat dry only) for best effect.    Protection while hydrating    Do not overuse soap. Unless you have been sweating extensively, just apply soap to groin and armpits.    Recommended products for body include: OTC unscented Dove for sensitive skin or OTC Vanicream cleansing bar.    Recommended facial cleansers include: OTC CeraVe hydrating facial cleanser or OTC Cetaphil daily facial cleanser.    Always try to wear rubber gloves when washing dishes or cleaning.    Avoid use of    Scented/perfumed products    Irritating clothing (wool, new jeans, new/unwashed clothing, scratchy synthetics)    Neosporin or triple antibiotic topical products    Products containing aloe, herbs, Vitamin E, or other \"natural ingredients\".    Dryer sheets or fabric softeners (while symptoms are present)    Sun damage will exacerbate scarring.    SUN PROTECTION INSTRUCTIONS  Sun damage can lead to skin cancer and premature aging of the skin.       The best way to protect from sun " "damage to your skin is to avoid the sun during peak hours (10 am - 2 pm) even on overcast days.      Use UPF sun-protective clothing, which while more expensive initially provides longer lasting coverage without having to worry about remembering to re-apply.  1. Wear a wide-brimmed hat and sunglasses.   2. Wear sun-protective clothing.  Planex and other [employer] make sun protective clothing that are stylish, comfortable and cool. [employer]ul and other [employer] make UV arm sleeves suitable for golfing, gardening and other activities.      Sunscreen instructions:  1. Use sunscreens with Zinc Oxide, Titanium Dioxide or Avobenzone to protect from UVA rays.  2. Use SPF 30-50+ to protect from UVB rays.  3. Re-apply every 2 hours even if water resistant.  4. Apply on your face every day even when cloudy and even in the winter. UVA \"aging rays\" penetrate window glass and are just as strong in the winter as in the summer.    Product Recommendations:    Good examples include: Christiano Perkins, EltaMD, Solbar    Good daily moisturizers with SPF: Vanicream, CeraVe.    For sensitive skin, consider : SkinMedica Essential Defense Mineral Shield Broad Spectrum SPF 35      Never use tanning beds. Tanning beds are associated with much higher risks of skin cancer.    All tanning damages the skin. Aim for ivory skin year round and you will have less trouble with your skin in years to come. There is no merit in getting \"a base tan\" before a warm weather vacation, as any tanning indicates your body's response to sun damage.    Stop smoking. Smokers have higher rates of skin cancer and also have premature skin wrinkling.    FYI  You should use about 3 tablespoons of sunscreen to protect your whole body. Thus a typical eight ounce bottle of sunscreen should last 4 applications. Remember, that the SPF rating is compromised if you don t apply enough. Most people only apply 1/2 - 1/3 of the amount they need. Also don t forget " areas such as your ears, feet, upper back and harder to reach places. Keep in mind that these amounts should be increased for larger body sizes.    Sunscreens with titanium dioxide and/or zinc oxide in the active ingredients are physical blockers as opposed to chemical blockers. Chemical-free sunscreens should not irritate the skin.    Spray-on sunscreens may be used for touch-up application only, not as a base layer. Also, use with caution around small children due to inhalation risk.    Avoid retinyl palmitate products.    Avoid combination products that include both sunscreen and insect repellant, as sunscreen should be applied every 2 hours, but insect repellant should not be applied as frequently.    SPF means sun protection factor, which is just the degree to which the sunscreen can protect against UVB rays. There is no rating system for UVA rays. SPF is calculated as the time skin will burn when sunscreen is applied vs. skin without sunscreen.    Water resistant sunscreens should be re-applied every 1-2 hours.    For more information:  http://www.skincancer.org/prevention/sun-protection/sunscreen/sunscreens-safe-and-effective    The patient was counseled to stop picking. He was counseled to use products free of fragrance, dyes, and plants. The importance of using bland cleansers and the regular use of heavy bland emollient creams was impressed upon the patient.      PROCEDURES:                                                    None.    CT: 30 min  TT: 25 min      The information in this document, created by the medical scribe for me, accurately reflects the services I personally performed and the decisions made by me. I have reviewed and approved this document for accuracy prior to leaving the patient care area.  Opal Jameson MD February 28, 2018 4:37 PM  [facility] [place]    Again, thank you for allowing me to participate in the care of your patient.        Sincerely,        Clarisa Jameson,  MD

## 2018-02-28 NOTE — MR AVS SNAPSHOT
After Visit Summary   2/28/2018    Tate Crocker    MRN: [id number]           Patient Information     Date Of Birth          1985        Visit Information        Provider Department      2/28/2018 4:20 PM Clarisa Jameson MD [facility] [place] Instructions    FUTURE APPOINTMENTS  Follow up as needed if symptoms are not resolving.    Avoid picking at affected areas on skin. Increased use of caffeine or other stimulants to stay awake may be contributing to the urge to pick.    ORAL ANTIBIOTIC  Take by mouth 1 capsule/tablet of sulfamethoxazole/trimethoprim (Bactrim) 800-160 mg two time(s) a day for 10 days. Make sure to finish the entire antibiotic regimen. This antibiotic has been demonstrated to be more effective than doxycycline against the bacteria in the culture.  Do not take doxycyline if you  Bactrim.    Regular use of moisturizer can help diminish the urge to pick.    DRY SKIN MANAGEMENT INSTRUCTIONS  Routine use of moisturizer is important for healthy, resilient skin not just for soft skin.     Sealing in moisture    Twice daily use of a moisturizer such as over-the-counter (OTC) CeraVe moisturizer cream (in the jar). CeraVe products contain ceramides and filaggrin proteins that can help to maintain the body's moisture layer.    After cleansing or washing, always apply moisturizer immediately after drying off (pat dry only) for best effect.    Protection while hydrating    Do not overuse soap. Unless you have been sweating extensively, just apply soap to groin and armpits.    Recommended products for body include: OTC unscented Dove for sensitive skin or OTC Vanicream cleansing bar.    Recommended facial cleansers include: OTC CeraVe hydrating facial cleanser or OTC Cetaphil daily facial cleanser.    Always try to wear rubber gloves when washing dishes or cleaning.    Avoid use of    Scented/perfumed products    Irritating clothing (wool, new  "jeans, new/unwashed clothing, scratchy synthetics)    Neosporin or triple antibiotic topical products    Products containing aloe, herbs, Vitamin E, or other \"natural ingredients\".    Dryer sheets or fabric softeners (while symptoms are present)    Sun damage will exacerbate scarring.    SUN PROTECTION INSTRUCTIONS  Sun damage can lead to skin cancer and premature aging of the skin.       The best way to protect from sun damage to your skin is to avoid the sun during peak hours (10 am - 2 pm) even on overcast days.      Use UPF sun-protective clothing, which while more expensive initially provides longer lasting coverage without having to worry about remembering to re-apply.  1. Wear a wide-brimmed hat and sunglasses.   2. Wear sun-protective clothing.  [employer] and other [employer] make sun protective clothing that are stylish, comfortable and cool. [employer] and other Secret make UV arm sleeves suitable for golfing, gardening and other activities.      Sunscreen instructions:  1. Use sunscreens with Zinc Oxide, Titanium Dioxide or Avobenzone to protect from UVA rays.  2. Use SPF 30-50+ to protect from UVB rays.  3. Re-apply every 2 hours even if water resistant.  4. Apply on your face every day even when cloudy and even in the winter. UVA \"aging rays\" penetrate window glass and are just as strong in the winter as in the summer.    Product Recommendations:    Good examples include: Blue Lizard, EltaMD, Solbar    Good daily moisturizers with SPF: Vanicream, CeraVe.    For sensitive skin, consider : SkinMedica Essential Defense Mineral Shield Broad Spectrum SPF 35      Never use tanning beds. Tanning beds are associated with much higher risks of skin cancer.    All tanning damages the skin. Aim for ivory skin year round and you will have less trouble with your skin in years to come. There is no merit in getting \"a base tan\" before a warm weather vacation, as any tanning indicates your body's response " to sun damage.    Stop smoking. Smokers have higher rates of skin cancer and also have premature skin wrinkling.    FYI  You should use about 3 tablespoons of sunscreen to protect your whole body. Thus a typical eight ounce bottle of sunscreen should last 4 applications. Remember, that the SPF rating is compromised if you don t apply enough. Most people only apply 1/2 - 1/3 of the amount they need. Also don t forget areas such as your ears, feet, upper back and harder to reach places. Keep in mind that these amounts should be increased for larger body sizes.    Sunscreens with titanium dioxide and/or zinc oxide in the active ingredients are physical blockers as opposed to chemical blockers. Chemical-free sunscreens should not irritate the skin.    Spray-on sunscreens may be used for touch-up application only, not as a base layer. Also, use with caution around small children due to inhalation risk.    Avoid retinyl palmitate products.    Avoid combination products that include both sunscreen and insect repellant, as sunscreen should be applied every 2 hours, but insect repellant should not be applied as frequently.    SPF means sun protection factor, which is just the degree to which the sunscreen can protect against UVB rays. There is no rating system for UVA rays. SPF is calculated as the time skin will burn when sunscreen is applied vs. skin without sunscreen.    Water resistant sunscreens should be re-applied every 1-2 hours.    For more information:  http://www.skincancer.org/prevention/sun-protection/sunscreen/sunscreens-safe-and-effective          Follow-ups after your visit        Who to contact     If you have questions or need follow up information about today's clinic visit or your schedule please contact [facility] [place] directly at [phone].  Normal or non-critical lab and imaging results will be communicated to you by MyChart, letter or phone within 4 business days after the clinic has  "received the results. If you do not hear from us within 7 days, please contact the clinic through Coder[employer] or phone. If you have a critical or abnormal lab result, we will notify you by phone as soon as possible.  Submit refill requests through [employer]oder[employer] or call your pharmacy and they will forward the refill request to us. Please allow 3 business days for your refill to be completed.          Additional Information About Your Visit        CoderBuddy Information     CoderBuddy lets you send messages to your doctor, view your test results, renew your prescriptions, schedule appointments and more. To sign up, go to [email or web address] . Click on \"Log in\" on the left side of the screen, which will take you to the Welcome page. Then click on \"Sign up Now\" on the right side of the page.     You will be asked to enter the access code listed below, as well as some personal information. Please follow the directions to create your username and password.     Your access code is: [email or web address]  Expires: 2018  5:07 PM     Your access code will  in 90 days. If you need help or a new code, please call your [place] clinic or [phone].        Care EveryWhere ID     This is your Care EveryWhere ID. This could be used by other organizations to access your [place] medical records  [id number]         Blood Pressure from Last 3 Encounters:   No data found for BP    Weight from Last 3 Encounters:   No data found for Wt              Today, you had the following     No orders found for display       Primary Care Provider Fax #    Provider Not In System [phone]                Equal Access to Services     [facility]: Hadii aad ku hadasho Soomaali, waaxda luqadaha, qaybta kaalmada adeegyada, hailey javier . So [facility] [phone].    ATENCIÓN: Si habla español, tiene a mccullough disposición servicios gratuitos de asistencia lingüística. Llame al [phone].    We comply with applicable federal civil " rights laws and [place] laws. We do not discriminate on the basis of race, color, national origin, age, disability, sex, sexual orientation, or gender identity.            Thank you!     Thank you for choosing [facility] [place]  for your care. Our goal is always to provide you with excellent care. Hearing back from our patients is one way we can continue to improve our services. Please take a few minutes to complete the written survey that you may receive in the mail after your visit with us. Thank you!             Your Updated Medication List - Protect others around you: Learn how to safely use, store and throw away your medicines at www.disposemymeds.org.          This list is accurate as of 2/28/18  5:04 PM.  Always use your most recent med list.                   Brand Name Dispense Instructions for use Diagnosis    doxycycline monohydrate 100 MG capsule     60 capsule    Take 1 capsule (100 mg) by mouth 2 times daily    Dermatitis, Pruritic disorder       predniSONE 20 MG tablet    DELTASONE    15 tablet    2 tabs po q day times 3 days , 1.5 tabs po q day times 3 days, 1 tab po q day times 3 days 1/2 tab q day times 3 days    Dermatitis, Pruritic disorder       sulfamethoxazole-trimethoprim 800-160 MG per tablet    BACTRIM DS/SEPTRA DS    20 tablet    Take 1 tablet by mouth 2 times daily for 10 days    Folliculitis

## 2018-02-28 NOTE — PATIENT INSTRUCTIONS
"FUTURE APPOINTMENTS  Follow up as needed if symptoms are not resolving.    Avoid picking at affected areas on skin. Increased use of caffeine or other stimulants to stay awake may be contributing to the urge to pick.    ORAL ANTIBIOTIC  Take by mouth 1 capsule/tablet of sulfamethoxazole/trimethoprim (Bactrim) 800-160 mg two time(s) a day for 10 days. Make sure to finish the entire antibiotic regimen. This antibiotic has been demonstrated to be more effective than doxycycline against the bacteria in the culture.  Do not take doxycyline if you  Bactrim.    Regular use of moisturizer can help diminish the urge to pick.    DRY SKIN MANAGEMENT INSTRUCTIONS  Routine use of moisturizer is important for healthy, resilient skin not just for soft skin.     Sealing in moisture    Twice daily use of a moisturizer such as over-the-counter (OTC) CeraVe moisturizer cream (in the jar). CeraVe products contain ceramides and filaggrin proteins that can help to maintain the body's moisture layer.    After cleansing or washing, always apply moisturizer immediately after drying off (pat dry only) for best effect.    Protection while hydrating    Do not overuse soap. Unless you have been sweating extensively, just apply soap to groin and armpits.    Recommended products for body include: OTC unscented Dove for sensitive skin or OTC Vanicream cleansing bar.    Recommended facial cleansers include: OTC CeraVe hydrating facial cleanser or OTC Cetaphil daily facial cleanser.    Always try to wear rubber gloves when washing dishes or cleaning.    Avoid use of    Scented/perfumed products    Irritating clothing (wool, new jeans, new/unwashed clothing, scratchy synthetics)    Neosporin or triple antibiotic topical products    Products containing aloe, herbs, Vitamin E, or other \"natural ingredients\".    Dryer sheets or fabric softeners (while symptoms are present)    Sun damage will exacerbate scarring.    SUN PROTECTION " "INSTRUCTIONS  Sun damage can lead to skin cancer and premature aging of the skin.       The best way to protect from sun damage to your skin is to avoid the sun during peak hours (10 am - 2 pm) even on overcast days.      Use UPF sun-protective clothing, which while more expensive initially provides longer lasting coverage without having to worry about remembering to re-apply.  1. Wear a wide-brimmed hat and sunglasses.   2. Wear sun-protective clothing.  [employer] and other [employer] make sun protective clothing that are stylish, comfortable and cool. [employer] and other [employer] make UV arm sleeves suitable for golfing, gardening and other activities.      Sunscreen instructions:  1. Use sunscreens with Zinc Oxide, Titanium Dioxide or Avobenzone to protect from UVA rays.  2. Use SPF 30-50+ to protect from UVB rays.  3. Re-apply every 2 hours even if water resistant.  4. Apply on your face every day even when cloudy and even in the winter. UVA \"aging rays\" penetrate window glass and are just as strong in the winter as in the summer.    Product Recommendations:    Good examples include: Blue Lizard, EltaMD, Solbar    Good daily moisturizers with SPF: Vanicream, CeraVe.    For sensitive skin, consider : SkinMedica Essential Defense Mineral Shield Broad Spectrum SPF 35      Never use tanning beds. Tanning beds are associated with much higher risks of skin cancer.    All tanning damages the skin. Aim for ivory skin year round and you will have less trouble with your skin in years to come. There is no merit in getting \"a base tan\" before a warm weather vacation, as any tanning indicates your body's response to sun damage.    Stop smoking. Smokers have higher rates of skin cancer and also have premature skin wrinkling.    FYI  You should use about 3 tablespoons of sunscreen to protect your whole body. Thus a typical eight ounce bottle of sunscreen should last 4 applications. Remember, that the SPF rating " is compromised if you don t apply enough. Most people only apply 1/2 - 1/3 of the amount they need. Also don t forget areas such as your ears, feet, upper back and harder to reach places. Keep in mind that these amounts should be increased for larger body sizes.    Sunscreens with titanium dioxide and/or zinc oxide in the active ingredients are physical blockers as opposed to chemical blockers. Chemical-free sunscreens should not irritate the skin.    Spray-on sunscreens may be used for touch-up application only, not as a base layer. Also, use with caution around small children due to inhalation risk.    Avoid retinyl palmitate products.    Avoid combination products that include both sunscreen and insect repellant, as sunscreen should be applied every 2 hours, but insect repellant should not be applied as frequently.    SPF means sun protection factor, which is just the degree to which the sunscreen can protect against UVB rays. There is no rating system for UVA rays. SPF is calculated as the time skin will burn when sunscreen is applied vs. skin without sunscreen.    Water resistant sunscreens should be re-applied every 1-2 hours.    For more information:  http://www.skincancer.org/prevention/sun-protection/sunscreen/sunscreens-safe-and-effective

## 2021-09-23 ENCOUNTER — TELEPHONE (OUTPATIENT)
Dept: BEHAVIORAL HEALTH | Facility: CLINIC | Age: 36
End: 2021-09-23

## 2021-09-23 ENCOUNTER — HOSPITAL ENCOUNTER (INPATIENT)
Facility: CLINIC | Age: 36
LOS: 2 days | Discharge: HOME | DRG: 897 | End: 2021-09-26
Attending: EMERGENCY MEDICINE | Admitting: PSYCHIATRY & NEUROLOGY

## 2021-09-23 DIAGNOSIS — F10.10 ALCOHOL ABUSE: ICD-10-CM

## 2021-09-23 DIAGNOSIS — F41.9 ANXIETY: Primary | ICD-10-CM

## 2021-09-23 DIAGNOSIS — Z11.52 ENCOUNTER FOR SCREENING LABORATORY TESTING FOR SEVERE ACUTE RESPIRATORY SYNDROME CORONAVIRUS 2 (SARS-COV-2): ICD-10-CM

## 2021-09-23 LAB
ALBUMIN SERPL-MCNC: 3.7 G/DL (ref 3.4–5)
ALBUMIN UR-MCNC: NEGATIVE MG/DL
ALCOHOL BREATH TEST: 0.28 (ref 0–0.01)
ALP SERPL-CCNC: 106 U/L (ref 40–150)
ALT SERPL W P-5'-P-CCNC: 160 U/L (ref 0–70)
AMPHETAMINES UR QL SCN: NORMAL
ANION GAP SERPL CALCULATED.3IONS-SCNC: 7 MMOL/L (ref 3–14)
APPEARANCE UR: CLEAR
AST SERPL W P-5'-P-CCNC: 207 U/L (ref 0–45)
BARBITURATES UR QL: NORMAL
BASOPHILS # BLD AUTO: 0 10E3/UL (ref 0–0.2)
BASOPHILS NFR BLD AUTO: 1 %
BENZODIAZ UR QL: NORMAL
BILIRUB SERPL-MCNC: 0.3 MG/DL (ref 0.2–1.3)
BILIRUB UR QL STRIP: NEGATIVE
BUN SERPL-MCNC: 6 MG/DL (ref 7–30)
CALCIUM SERPL-MCNC: 8.6 MG/DL (ref 8.5–10.1)
CANNABINOIDS UR QL SCN: NORMAL
CHLORIDE BLD-SCNC: 108 MMOL/L (ref 94–109)
CO2 SERPL-SCNC: 27 MMOL/L (ref 20–32)
COCAINE UR QL: NORMAL
COLOR UR AUTO: NORMAL
CREAT SERPL-MCNC: 0.88 MG/DL (ref 0.66–1.25)
EOSINOPHIL # BLD AUTO: 0 10E3/UL (ref 0–0.7)
EOSINOPHIL NFR BLD AUTO: 1 %
ERYTHROCYTE [DISTWIDTH] IN BLOOD BY AUTOMATED COUNT: 14.3 % (ref 10–15)
ETHANOL SERPL-MCNC: 0.23 G/DL
GFR SERPL CREATININE-BSD FRML MDRD: >90 ML/MIN/1.73M2
GLUCOSE BLD-MCNC: 130 MG/DL (ref 70–99)
GLUCOSE UR STRIP-MCNC: NEGATIVE MG/DL
HCT VFR BLD AUTO: 41 % (ref 40–53)
HGB BLD-MCNC: 14 G/DL (ref 13.3–17.7)
HGB UR QL STRIP: NEGATIVE
IMM GRANULOCYTES # BLD: 0 10E3/UL
IMM GRANULOCYTES NFR BLD: 0 %
INR PPP: 0.91 (ref 0.86–1.14)
KETONES UR STRIP-MCNC: NEGATIVE MG/DL
LEUKOCYTE ESTERASE UR QL STRIP: NEGATIVE
LYMPHOCYTES # BLD AUTO: 1.7 10E3/UL (ref 0.8–5.3)
LYMPHOCYTES NFR BLD AUTO: 36 %
MCH RBC QN AUTO: 32.9 PG (ref 26.5–33)
MCHC RBC AUTO-ENTMCNC: 34.1 G/DL (ref 31.5–36.5)
MCV RBC AUTO: 96 FL (ref 78–100)
MONOCYTES # BLD AUTO: 0.4 10E3/UL (ref 0–1.3)
MONOCYTES NFR BLD AUTO: 9 %
NEUTROPHILS # BLD AUTO: 2.6 10E3/UL (ref 1.6–8.3)
NEUTROPHILS NFR BLD AUTO: 53 %
NITRATE UR QL: NEGATIVE
NRBC # BLD AUTO: 0 10E3/UL
NRBC BLD AUTO-RTO: 0 /100
OPIATES UR QL SCN: NORMAL
PH UR STRIP: 6 [PH] (ref 5–7)
PLATELET # BLD AUTO: 190 10E3/UL (ref 150–450)
POTASSIUM BLD-SCNC: 4 MMOL/L (ref 3.4–5.3)
PROT SERPL-MCNC: 7.9 G/DL (ref 6.8–8.8)
RBC # BLD AUTO: 4.26 10E6/UL (ref 4.4–5.9)
RBC URINE: 0 /HPF
SARS-COV-2 RNA RESP QL NAA+PROBE: NEGATIVE
SODIUM SERPL-SCNC: 142 MMOL/L (ref 133–144)
SP GR UR STRIP: 1 (ref 1–1.03)
TSH SERPL DL<=0.005 MIU/L-ACNC: 1.47 MU/L (ref 0.4–4)
UROBILINOGEN UR STRIP-MCNC: NORMAL MG/DL
WBC # BLD AUTO: 4.8 10E3/UL (ref 4–11)
WBC URINE: 0 /HPF

## 2021-09-23 PROCEDURE — C9803 HOPD COVID-19 SPEC COLLECT: HCPCS | Performed by: EMERGENCY MEDICINE

## 2021-09-23 PROCEDURE — 36415 COLL VENOUS BLD VENIPUNCTURE: CPT | Performed by: EMERGENCY MEDICINE

## 2021-09-23 PROCEDURE — 82040 ASSAY OF SERUM ALBUMIN: CPT | Performed by: EMERGENCY MEDICINE

## 2021-09-23 PROCEDURE — 81001 URINALYSIS AUTO W/SCOPE: CPT | Performed by: EMERGENCY MEDICINE

## 2021-09-23 PROCEDURE — 82075 ASSAY OF BREATH ETHANOL: CPT | Performed by: EMERGENCY MEDICINE

## 2021-09-23 PROCEDURE — 85610 PROTHROMBIN TIME: CPT | Performed by: EMERGENCY MEDICINE

## 2021-09-23 PROCEDURE — 84443 ASSAY THYROID STIM HORMONE: CPT | Performed by: EMERGENCY MEDICINE

## 2021-09-23 PROCEDURE — 99284 EMERGENCY DEPT VISIT MOD MDM: CPT | Performed by: EMERGENCY MEDICINE

## 2021-09-23 PROCEDURE — 85025 COMPLETE CBC W/AUTO DIFF WBC: CPT | Performed by: EMERGENCY MEDICINE

## 2021-09-23 PROCEDURE — 250N000013 HC RX MED GY IP 250 OP 250 PS 637: Performed by: EMERGENCY MEDICINE

## 2021-09-23 PROCEDURE — 82077 ASSAY SPEC XCP UR&BREATH IA: CPT | Performed by: EMERGENCY MEDICINE

## 2021-09-23 PROCEDURE — U0005 INFEC AGEN DETEC AMPLI PROBE: HCPCS | Performed by: EMERGENCY MEDICINE

## 2021-09-23 PROCEDURE — 80307 DRUG TEST PRSMV CHEM ANLYZR: CPT | Performed by: EMERGENCY MEDICINE

## 2021-09-23 PROCEDURE — 99285 EMERGENCY DEPT VISIT HI MDM: CPT | Mod: 25 | Performed by: EMERGENCY MEDICINE

## 2021-09-23 RX ORDER — DIAZEPAM 5 MG
5-20 TABLET ORAL EVERY 30 MIN PRN
Status: DISCONTINUED | OUTPATIENT
Start: 2021-09-23 | End: 2021-09-24

## 2021-09-23 RX ORDER — FOLIC ACID 1 MG/1
1 TABLET ORAL DAILY
Status: DISCONTINUED | OUTPATIENT
Start: 2021-09-24 | End: 2021-09-26 | Stop reason: HOSPADM

## 2021-09-23 RX ORDER — MULTIPLE VITAMINS W/ MINERALS TAB 9MG-400MCG
1 TAB ORAL ONCE
Status: COMPLETED | OUTPATIENT
Start: 2021-09-23 | End: 2021-09-23

## 2021-09-23 RX ORDER — LANOLIN ALCOHOL/MO/W.PET/CERES
100 CREAM (GRAM) TOPICAL ONCE
Status: COMPLETED | OUTPATIENT
Start: 2021-09-23 | End: 2021-09-23

## 2021-09-23 RX ADMIN — NICOTINE POLACRILEX 4 MG: 2 GUM, CHEWING BUCCAL at 17:49

## 2021-09-23 RX ADMIN — NICOTINE POLACRILEX 4 MG: 2 GUM, CHEWING BUCCAL at 23:08

## 2021-09-23 RX ADMIN — NICOTINE POLACRILEX 4 MG: 2 GUM, CHEWING BUCCAL at 16:17

## 2021-09-23 RX ADMIN — DIAZEPAM 5 MG: 5 TABLET ORAL at 18:22

## 2021-09-23 RX ADMIN — DIAZEPAM 5 MG: 5 TABLET ORAL at 17:19

## 2021-09-23 RX ADMIN — NICOTINE POLACRILEX 4 MG: 2 GUM, CHEWING BUCCAL at 19:59

## 2021-09-23 RX ADMIN — MULTIPLE VITAMINS W/ MINERALS TAB 1 TABLET: TAB at 20:22

## 2021-09-23 RX ADMIN — THIAMINE HCL TAB 100 MG 100 MG: 100 TAB at 20:22

## 2021-09-23 RX ADMIN — DIAZEPAM 10 MG: 5 TABLET ORAL at 20:22

## 2021-09-23 RX ADMIN — DIAZEPAM 10 MG: 5 TABLET ORAL at 23:08

## 2021-09-23 ASSESSMENT — ENCOUNTER SYMPTOMS
FEVER: 0
BLOOD IN STOOL: 1
SHORTNESS OF BREATH: 0
EYES NEGATIVE: 1
ABDOMINAL PAIN: 0
VOMITING: 0
TREMORS: 1
HEADACHES: 0
NAUSEA: 1
COUGH: 0

## 2021-09-23 NOTE — ED PROVIDER NOTES
[facility] EMERGENCY DEPARTMENT ([facility])    9/23/21     Hallway 3  History     Chief Complaint   Patient presents with     Alcohol Problem     1 liter of vodka daily, last drink prior to coming in, denies history of seizures or DTs, uses marijuana occasionally, seeking detox     HPI  Tate Crocker is a 36 year old male, generally healthy, presenting with heavy alcohol use and seeking detox.    Patient reports that he is otherwise healthy, but has had increasing alcohol behaviors.  He is currently drinking about a liter of vodka a day (no other alcohol use such as beer, wine, etc.).  Last drink just prior to arrival. Will occasionally use marijuana but no other substance use or overdose/misuse of OTC or prescription medications.  With his alcohol use increasing over time, he reports he is more prone to get withdrawal symptoms.  He does feel very tremulous, somewhat unwell, lightheaded, nauseated, etc. if he is not drinking and has to drink again to ai the symptoms.  Denies any history of withdrawal seizures, no history of DTs or hallucinations.      This morning when he woke up he felt somewhat chilled with his withdrawal, but otherwise has not had any actual fevers or chills.  No traumas or falls.  No headaches, vision/eye or ear symptoms.  Only feels lightheaded really with withdrawal type symptoms and as his alcohol behaviors have increased.  With that no vertigo, no chest pain, no shortness of breath.  No cough.  Has not been around any ill contacts.  No abdominal pain.  No regular nausea or vomiting.  Has noted occasional both bright and dark blood in his stool, just small pieces and not consistently, no other  symptoms.  No extremity symptoms.  No SI or HI.  No other new symptoms or complaints at this time.  Please see ROS for further details.     This part of the medical record was transcribed by Rogerio Bustamante, Medical Scribe, from a dictation done by Shahrzad Schaefer MD.     I have  reviewed the Medications, Allergies, Past Medical and Surgical History, and Social History in the [employer] system.  History reviewed. No pertinent past medical history.   Patient reports some anxiety, symptoms of shakiness, nausea, dizziness if he's not drinking.     History reviewed. No pertinent surgical history.  Past medical history, past surgical history, medications, and allergies were reviewed with the patient.     History reviewed. No pertinent family history.    Social History     Tobacco Use     Smoking status: Current Every Day Smoker     Types: Vaping Device     Smokeless tobacco: Never Used   Substance Use Topics     Alcohol use: No      Social history was reviewed with the patient.     Review of Systems   Constitutional: Negative for fatigue and fever.   HENT: Negative for ear discharge, sore throat and voice change.    Eyes: Negative.  Negative for visual disturbance.   Respiratory: Negative for cough and shortness of breath.    Cardiovascular: Negative for chest pain.   Gastrointestinal: Positive for blood in stool (bright and dark blood) and nausea. Negative for abdominal pain and vomiting.   Genitourinary: Negative.    Musculoskeletal: Negative for back pain, neck pain and neck stiffness.   Skin: Negative.    Allergic/Immunologic: Negative for immunocompromised state.   Neurological: Positive for tremors and light-headedness (w/ withdrawal). Negative for syncope, weakness, numbness and headaches.   Psychiatric/Behavioral: Negative for agitation, hallucinations, self-injury and suicidal ideas. The patient is nervous/anxious.    All other systems reviewed and are negative.      Physical Exam   BP: (!) 145/101  Pulse: 89  Temp: 97.7  F (36.5  C)  Resp: 16  Weight: 77.1 kg (170 lb)  SpO2: 97 %      CONSTITUTIONAL: Well-developed and well-nourished. Awake and alert. Non-toxic appearance. No acute distress. Patient in the hallway with two guests, eating sushi, looks nontoxic.  Last drink prior to arrival but  currently is almost clinically sober at this point.  HENT:   - Head: Normocephalic and atraumatic.   - Ears: Hearing and external ear grossly normal.   - Nose: Nose normal. No rhinorrhea. No epistaxis.   - Mouth/Throat: MMM  EYES: Conjunctivae and lids are normal. No scleral icterus.   NECK: Normal range of motion and phonation normal. Neck supple.  No tracheal deviation, no stridor. No edema or erythema noted.  CARDIOVASCULAR: Normal rate, regular rhythm and no appreciable abnormal heart sounds.  PULMONARY/CHEST: Normal work of breathing. No accessory muscle usage or stridor. No respiratory distress.  No appreciable abnormal breath sounds.  ABDOMEN: Soft, non-distended. No tenderness. No rigidity, rebound or guarding.   MUSCULOSKELETAL: Extremities warm and seemingly well perfused. No edema or calf tenderness. Strength/sensation intact.   NEUROLOGIC: Awake, alert. Not disoriented. He displays no atrophy and no tremor. Normal tone. No seizure activity. GCS 15. Cranial nerves intact.  Strength and sensation intact throughout.  SKIN: Skin is warm and dry. No rash noted. No diaphoresis. No pallor.   PSYCHIATRIC: Normal mood and affect. Speech and behavior normal. Thought processes linear. Cognition and memory are normal.      ED Course     ED Course as of Sep 23 1951   Thu Sep 23, 2021   1934 RBC Urine: 0   1935 WBC Urine: 0   1935 Blood Urine: Negative            Assessments & Plan (with Medical Decision Making)   IMPRESSION: Tate Crocker is a 36 year old male, generally healthy, presenting with heavy alcohol use and seeking detox with report of occasional blood in the stool and occasional lightheadedness (though in the setting of other withdrawal symptoms) as described further above.    Clinically, patient peers nontoxic, NAD.  He is sitting comfortably in the hallway with 2 guests, eating sushi, mentating appropriately and seeming almost clinically sober despite his elevated ethanol level.  Otherwise on  examination quite benign, no acute findings.    DDx includes, but not limited to, symptoms related to his alcohol use and withdrawal which I think to be most likely, additionally if he is having some blood in his stools certainly liver dysfunction could be a cause of this, with additional potential causes such as other GI bleed, hemorrhoids, amongst others, will get screening laboratory studies in addition to the UDS/ethanol needed for detox admissions for some additional screening though currently on his exam seems fairly benign.  Regardless, I think he should at minimum follow-up with primary care which I discussed with the patient and he and his loved one/guests have agreed to.    PLAN:   - Laboratory studies, urine studies, UDS and ethanol testing  - Disposition pending ED course, either detox or IM admit pending results.  If no acute emergent findings and able to admit to detox, I would recommend a medical consult to further evaluate symptoms and to ensure established care for follow-up with clinic.  - Pt voluntary, no SI or HI.     This part of the medical record was transcribed by Rogerio Bustamante, Medical Scribe, from a dictation done by Shahrzad Schaefer MD.     RESULTS:  - Labs: Some LFT elevation, Hgb actually increased from previous (unable to perform rectal exam as patient in hallway bed, but w/ Hgb increased and in safe range currently, think can get IM consult while admitted to detox and have evaluated further, no findings on labs/vitals for emergent GIB, etc.)      INTERVENTIONS:   - MSSA w/ Valium    RE-EVALUATION:  - Pt otherwise continues to do well here in the ED, no acute issues or apparent concerning changes in vitals or clinical appearance.    DISCUSSIONS:  - w/ BH Intake: Reviewed patient/presentation, current state of workup/any pending studies. They will admit for further evaluation/management, F/U pending studies as needed, coordinate w/ consulting services as needed (recommended IM consult  given LFTs, physical symptoms, etc.). No additional requests/recommendations for workup/management for in the ED at this time.   - w/ Patient: I have reviewed the available findings, plan with the patient, including ongoing need for Med evaluation (while in detox and further as outpatient) for sx's, lab findings, etc.. They expressed understanding and agreement with this plan. All questions answered to the best of our ability at this time.       DISPOSITION/PLANNING:  - IMPRESSION: Alcohol abuse, withdrawal sx's, elevated LFTs, multiple physical symptoms  - DISPOSITION: Detox admission  - RECOMMENDATIONS:   --- MSSA / equivalent  --- IM consult for elevated LFTs and other symptoms  --- Workup for report of intermittent blood in stools (Current Hgb > 14, no findings for hemodynamic instability, etc.)  --- Psych consult as needed for anxiety sx's.     ______________________________________________________________________       I, Rogerio Bustamante, am serving as a trained medical scribe to document services personally performed by Shahrzad Schaefer MD, based on the provider's statements to me.     I, Shahrzad Schaefer MD, was physically present and have reviewed and verified the accuracy of this note documented by Rogerio Bustamante.    --  Shahrzad Schaefer MD  9/23/2021   [facility] EMERGENCY DEPARTMENT     Shahrzad Schaefer MD  09/24/21 0886

## 2021-09-23 NOTE — TELEPHONE ENCOUNTER
S: Silvano, [facility], 36/M, alcohol detox     B: Pt repots drinking a L of vodka daily, ARY prior to arrival, for the last long time   No hx of sz or DTs   Pt reports getting nausea and tremulous     Patient cleared and ready for behavioral bed placement: Yes   No covid concerns, test to be ordered     A: Voluntary     R: 3A/Gene   CD admit     Intake awaiting med clearance from MD before proceeding with detox admission   755pm - MD called and reports the pt is med cleared, will have the RNs collect covid   757pm - Zunilda, on call provider, paged  758pm - Zunilda accepts   Pt placed in queue notified   800pm - unit charge notified, 930p for report, covid must be resulted for shared room   802pm - ED charge notified via text page     
Detail Level: Detailed
General Sunscreen Counseling: I recommended a broad spectrum sunscreen with a SPF of 30 or higher.  I explained that SPF 30 sunscreens block approximately 97 percent of the sun's harmful rays.  Sunscreens should be applied at least 15 minutes prior to expected sun exposure and then every 2 hours after that as long as sun exposure continues. If swimming or exercising sunscreen should be reapplied every 45 minutes to an hour after getting wet or sweating.  One ounce, or the equivalent of a shot glass full of sunscreen, is adequate to protect the skin not covered by a bathing suit. I also recommended a lip balm with a sunscreen as well. Sun protective clothing can be used in lieu of sunscreen but must be worn the entire time you are exposed to the sun's rays.

## 2021-09-24 PROBLEM — F10.10 ALCOHOL ABUSE: Status: ACTIVE | Noted: 2021-09-24

## 2021-09-24 LAB
CHOLEST SERPL-MCNC: 224 MG/DL
GGT SERPL-CCNC: 607 U/L (ref 0–75)
HDLC SERPL-MCNC: 136 MG/DL
LDLC SERPL CALC-MCNC: 79 MG/DL
NONHDLC SERPL-MCNC: 88 MG/DL
TRIGL SERPL-MCNC: 44 MG/DL

## 2021-09-24 PROCEDURE — 250N000013 HC RX MED GY IP 250 OP 250 PS 637: Performed by: PHYSICIAN ASSISTANT

## 2021-09-24 PROCEDURE — 82977 ASSAY OF GGT: CPT | Performed by: PSYCHIATRY & NEUROLOGY

## 2021-09-24 PROCEDURE — 250N000013 HC RX MED GY IP 250 OP 250 PS 637: Performed by: PSYCHIATRY & NEUROLOGY

## 2021-09-24 PROCEDURE — H2035 A/D TX PROGRAM, PER HOUR: HCPCS | Mod: HQ

## 2021-09-24 PROCEDURE — 99207 PR CONSULT E&M CHANGED TO SUBSEQUENT LEVEL: CPT | Performed by: PHYSICIAN ASSISTANT

## 2021-09-24 PROCEDURE — 99222 1ST HOSP IP/OBS MODERATE 55: CPT | Performed by: PSYCHIATRY & NEUROLOGY

## 2021-09-24 PROCEDURE — 128N000004 HC R&B CD ADULT

## 2021-09-24 PROCEDURE — 36415 COLL VENOUS BLD VENIPUNCTURE: CPT | Performed by: PSYCHIATRY & NEUROLOGY

## 2021-09-24 PROCEDURE — 99232 SBSQ HOSP IP/OBS MODERATE 35: CPT | Performed by: PHYSICIAN ASSISTANT

## 2021-09-24 PROCEDURE — 80061 LIPID PANEL: CPT | Performed by: PSYCHIATRY & NEUROLOGY

## 2021-09-24 PROCEDURE — 250N000013 HC RX MED GY IP 250 OP 250 PS 637: Performed by: EMERGENCY MEDICINE

## 2021-09-24 RX ORDER — DIAZEPAM 5 MG
5-20 TABLET ORAL EVERY 30 MIN PRN
Status: DISCONTINUED | OUTPATIENT
Start: 2021-09-24 | End: 2021-09-26 | Stop reason: HOSPADM

## 2021-09-24 RX ORDER — HYDROXYZINE HYDROCHLORIDE 25 MG/1
25 TABLET, FILM COATED ORAL EVERY 4 HOURS PRN
Status: DISCONTINUED | OUTPATIENT
Start: 2021-09-24 | End: 2021-09-26 | Stop reason: HOSPADM

## 2021-09-24 RX ORDER — TRAZODONE HYDROCHLORIDE 50 MG/1
50 TABLET, FILM COATED ORAL
Status: DISCONTINUED | OUTPATIENT
Start: 2021-09-24 | End: 2021-09-26 | Stop reason: HOSPADM

## 2021-09-24 RX ORDER — FOLIC ACID 1 MG/1
1 TABLET ORAL DAILY
Status: DISCONTINUED | OUTPATIENT
Start: 2021-09-24 | End: 2021-09-24

## 2021-09-24 RX ORDER — PANTOPRAZOLE SODIUM 40 MG/1
40 TABLET, DELAYED RELEASE ORAL
Status: DISCONTINUED | OUTPATIENT
Start: 2021-09-24 | End: 2021-09-26 | Stop reason: HOSPADM

## 2021-09-24 RX ORDER — LOPERAMIDE HCL 2 MG
2 CAPSULE ORAL 4 TIMES DAILY PRN
Status: DISCONTINUED | OUTPATIENT
Start: 2021-09-24 | End: 2021-09-26 | Stop reason: HOSPADM

## 2021-09-24 RX ORDER — MULTIPLE VITAMINS W/ MINERALS TAB 9MG-400MCG
1 TAB ORAL DAILY
Status: DISCONTINUED | OUTPATIENT
Start: 2021-09-24 | End: 2021-09-26 | Stop reason: HOSPADM

## 2021-09-24 RX ORDER — LANOLIN ALCOHOL/MO/W.PET/CERES
100 CREAM (GRAM) TOPICAL DAILY
Status: DISCONTINUED | OUTPATIENT
Start: 2021-09-24 | End: 2021-09-26 | Stop reason: HOSPADM

## 2021-09-24 RX ORDER — ATENOLOL 50 MG/1
50 TABLET ORAL DAILY PRN
Status: DISCONTINUED | OUTPATIENT
Start: 2021-09-24 | End: 2021-09-26 | Stop reason: HOSPADM

## 2021-09-24 RX ORDER — ONDANSETRON 4 MG/1
4 TABLET, ORALLY DISINTEGRATING ORAL EVERY 6 HOURS PRN
Status: DISCONTINUED | OUTPATIENT
Start: 2021-09-24 | End: 2021-09-26 | Stop reason: HOSPADM

## 2021-09-24 RX ORDER — AMOXICILLIN 250 MG
1 CAPSULE ORAL 2 TIMES DAILY PRN
Status: DISCONTINUED | OUTPATIENT
Start: 2021-09-24 | End: 2021-09-26 | Stop reason: HOSPADM

## 2021-09-24 RX ORDER — MAGNESIUM HYDROXIDE/ALUMINUM HYDROXICE/SIMETHICONE 120; 1200; 1200 MG/30ML; MG/30ML; MG/30ML
30 SUSPENSION ORAL EVERY 4 HOURS PRN
Status: DISCONTINUED | OUTPATIENT
Start: 2021-09-24 | End: 2021-09-26 | Stop reason: HOSPADM

## 2021-09-24 RX ORDER — ACETAMINOPHEN 325 MG/1
650 TABLET ORAL EVERY 4 HOURS PRN
Status: DISCONTINUED | OUTPATIENT
Start: 2021-09-24 | End: 2021-09-25

## 2021-09-24 RX ADMIN — TRAZODONE HYDROCHLORIDE 50 MG: 50 TABLET ORAL at 21:53

## 2021-09-24 RX ADMIN — THIAMINE HCL TAB 100 MG 100 MG: 100 TAB at 08:35

## 2021-09-24 RX ADMIN — NICOTINE POLACRILEX 4 MG: 2 GUM, CHEWING BUCCAL at 22:11

## 2021-09-24 RX ADMIN — NICOTINE POLACRILEX 4 MG: 2 GUM, CHEWING BUCCAL at 12:46

## 2021-09-24 RX ADMIN — LOPERAMIDE HYDROCHLORIDE 2 MG: 2 CAPSULE ORAL at 21:53

## 2021-09-24 RX ADMIN — DIAZEPAM 10 MG: 5 TABLET ORAL at 06:12

## 2021-09-24 RX ADMIN — DIAZEPAM 10 MG: 5 TABLET ORAL at 15:12

## 2021-09-24 RX ADMIN — DIAZEPAM 10 MG: 5 TABLET ORAL at 02:33

## 2021-09-24 RX ADMIN — PANTOPRAZOLE SODIUM 40 MG: 40 TABLET, DELAYED RELEASE ORAL at 16:44

## 2021-09-24 RX ADMIN — PANTOPRAZOLE SODIUM 40 MG: 40 TABLET, DELAYED RELEASE ORAL at 12:44

## 2021-09-24 RX ADMIN — FOLIC ACID 1 MG: 1 TABLET ORAL at 08:35

## 2021-09-24 RX ADMIN — DIAZEPAM 10 MG: 5 TABLET ORAL at 20:28

## 2021-09-24 RX ADMIN — NICOTINE POLACRILEX 4 MG: 2 GUM, CHEWING BUCCAL at 18:58

## 2021-09-24 RX ADMIN — TRAZODONE HYDROCHLORIDE 50 MG: 50 TABLET ORAL at 02:33

## 2021-09-24 RX ADMIN — MULTIPLE VITAMINS W/ MINERALS TAB 1 TABLET: TAB at 08:35

## 2021-09-24 RX ADMIN — HYDROXYZINE HYDROCHLORIDE 25 MG: 25 TABLET, FILM COATED ORAL at 16:33

## 2021-09-24 RX ADMIN — DIAZEPAM 10 MG: 5 TABLET ORAL at 08:35

## 2021-09-24 ASSESSMENT — ENCOUNTER SYMPTOMS
NECK PAIN: 0
HALLUCINATIONS: 0
NECK STIFFNESS: 0
BACK PAIN: 0
AGITATION: 0
NERVOUS/ANXIOUS: 1
VOICE CHANGE: 0
WEAKNESS: 0
FATIGUE: 0
NUMBNESS: 0
LIGHT-HEADEDNESS: 1
SORE THROAT: 0

## 2021-09-24 ASSESSMENT — ACTIVITIES OF DAILY LIVING (ADL)
LAUNDRY: WITH SUPERVISION
LAUNDRY: WITH SUPERVISION
ORAL_HYGIENE: INDEPENDENT
HYGIENE/GROOMING: INDEPENDENT
HYGIENE/GROOMING: INDEPENDENT
ORAL_HYGIENE: INDEPENDENT
LAUNDRY: WITH SUPERVISION
HYGIENE/GROOMING: INDEPENDENT
DRESS: INDEPENDENT
DRESS: INDEPENDENT
ORAL_HYGIENE: INDEPENDENT
DRESS: INDEPENDENT

## 2021-09-24 ASSESSMENT — MIFFLIN-ST. JEOR: SCORE: 1707.36

## 2021-09-24 NOTE — PLAN OF CARE
Behavioral Team Discussion: (9/24/2021)    Continued Stay Criteria/Rationale: Patient admitted for Chemical Use Issues.  Plan: The following services will be provided to the patient; psychiatric assessment, medication management, therapeutic milieu, individual and group support, and skills groups.   Participants: 3A Provider: Dr. Eugene Mills MD; 3A RN: Jamie Osman RN; 3A CM's: Chela Anthony, [facility], [facility].  Summary/Recommendation: Providers will assess today for treatment recommendations, discharge planning, and aftercare plans. CM will meet with pt for discharge planning.   Medical/Physical: Waiting on internal medicine consult.   Precautions:   Behavioral Orders   Procedures     Code 1 - Restrict to Unit     Routine Programming     As clinically indicated     Status 15     Every 15 minutes.     Withdrawal precautions     Withdrawal precautions     Rationale for change in precautions or plan: N/A  Progress: No Change.

## 2021-09-24 NOTE — PROGRESS NOTES
09/24/21 0217   Patient Belongings   Did you bring any home meds/supplements to the hospital?  No   Patient Belongings locker   Patient Belongings Put in Hospital Secure Location (Security or Locker, etc.) cell phone/electronics;clothing;laptop computer   Belongings Search Yes   Clothing Search Yes   Second Staff Fermín Olson All of patient's belongings are in a bin and his discharge bin.     BIN:   Shorts, shirt, sunglasses, Slippers, vape, a backpack with a laptop, clothing, chargers and some personal stuff.    DISCHARGE BIN:  Phone    A               Admission:  I am responsible for any personal items that are not sent to the safe or pharmacy.  [facility] is not responsible for loss, theft or damage of any property in my possession.    Signature:  _________________________________ Date: _______  Time: _____                                              Staff Signature:  ____________________________ Date: ________  Time: _____      2nd Staff person, if patient is unable/unwilling to sign:    Signature: ________________________________ Date: ________  Time: _____     Discharge:  [facility] has returned all of my personal belongings:    Signature: _________________________________ Date: ________  Time: _____                                          Staff Signature:  ____________________________ Date: ________  Time: _____

## 2021-09-24 NOTE — CONSULTS
Consult Date: 09/24/2021    HISTORY OF PRESENT ILLNESS:  The patient is a 36-year-old male admitted to station 3A for alcohol abuse and detoxification.  Drinking on average 1 liter of vodka daily with breathalyzer on admission 0.28.  Internal Medicine consultation was ordered by Dr. Ramirez to assess medical problems including abnormal liver function testing as well as probable alcohol-induced gastritis.  At this time, Tate admits to significant sleepiness, as he apparently did not get to sleep until 3:00 a.m.  Does endorse some mild epigastric abdominal pain as well as having dark tarry stools prior to admission.  States when he quits drinking he has severe acid reflux but is not on any medication.  States he is mildly nauseous this morning, but denies vomiting.  Has been able to eat small amounts of food.  Denies fever or chills.  Denies sore throat and cough.  Denies chest pain, shortness of breath.  Denies bladder or bowel concerns.    PAST MEDICAL HISTORY:    1.  Alcohol use disorder and other psychiatric history per Dr. Ramirez.  2.  Denies history of major medical problems including cardiopulmonary disease, hypertension, and diabetes.    PAST SURGICAL HISTORY:  None.    ADMISSION MEDICATIONS:  None.    ALLERGIES:  NO KNOWN DRUG ALLERGIES.    SOCIAL HISTORY:   and lives in [place].  Currently unemployed.    History reviewed. No pertinent family history.      REVIEW OF SYSTEMS:  A 10-point review of systems is negative except for stated above in history of present illness.    PHYSICAL EXAMINATION:    GENERAL:  Nontoxic-appearing man in no acute distress.  VITAL SIGNS:  Stable, temperature afebrile, pulse in the 80s, blood pressure 130s over low 100s.  LUNGS:  Clear.  CARDIOVASCULAR:  Regular rate and rhythm.  ABDOMEN:  Soft, mildly tender in the epigastric region without guarding.  EXTREMITIES:  No edema.  SKIN:  No rash on exposed areas.  NEUROLOGIC:  He is awake, alert and oriented x3.   Cranial nerves grossly intact.  Motor strength is symmetric.  He is mildly tremulous.  Gait deferred.    LABORATORY DATA:  Breathalyzer on admission 0.28, , .  .  Total cholesterol 224.  Otherwise, CBC rest of comprehensive panel, lipid panel and TSH essentially normal.  Urinalysis negative.  COVID testing negative.  INR within normal limits.  Urine drug screen negative.    IMPRESSION:    1.  Alcohol abuse and withdrawal per Dr. Ramirez.  2.  Acute epigastric abdominal tenderness and intermittent pain with what sounds like melena prior to admission, likely due to alcohol-induced gastritis.  3.  Mild transaminitis, most likely due to his heavy alcohol abuse prior to admission and expect to resolve as patient continues to detoxify and abstain from future alcohol use.  4.  Mild hypercholesterolemia, on this morning lipid panel question nonfasting blood draw.    PLAN:  Will empirically start the patient on Protonix 40 mg b.i.d. x30 days for his likely alcohol-induced gastritis.  Repeat hepatic panel tomorrow a.m.  He should follow up with family physician after discharge in 1-2 weeks for hospital followup and repeat labs including liver function testing and fasting lipid panel.  No further medical recommendations at this time.  If tomorrow's hepatic panel improved or normalized, medicine signing off.  Please feel free to call if questions.    Thank you for this consultation.      Hamilton Abdalla PA-C  Internal Medicine Hospitalist   [facility]  [phone]         D: 2021   T: 2021   MT: [id number]    Name:     ZECHARIAH BLEDSOE  MRN:      [id number]        Account:      [id number]   :      1985           Consult Date: 2021     Document: [id number]

## 2021-09-24 NOTE — PLAN OF CARE
Problem: Adult Inpatient Plan of Care  Goal: Plan of Care Review  9/24/2021 0317 by Regine Baca, RN  Outcome: No Change  9/24/2021 0316 by Regine Baca, RN  Outcome: No Change     Tate is a 36 year old male admitted from [facility] adult ED due to worsening alcohol dependence. He reports his drink of choice is vodka. Drinks about 1L of vodka daily. This is his first time seeking for inpatient detox.     Pt.reports he has had a DWI in early Spring. Reports his drinking has increasingly worsened ever since as he still drinks and drives. His DWI  advised him to seek for detox. Pt.reports he does not have a job and spends his day drinking.     Started on MSSA with valium protocol to manage withdrawal symptoms. Denied SI/SIB/hallucination. Denied history of withdrawal seizure and DTs. Will continue to monitor.

## 2021-09-24 NOTE — PLAN OF CARE
Pt continues to be monitored for alcohol withdrawal. MSSA scores of 10 and  this shift. Pt endorses moderate anxiety 4/10, denies depression. Pt isolative to room this shift. Pt is interested in pursuing chemical dependency options for treatment on discharge, informed pt of need for CD eval and encouraged patient to complete paperwork.

## 2021-09-24 NOTE — PROGRESS NOTES
Writer checked in with client, as he was observed to just be starting his assessment paperwork. Patient states he is hoping to get his assessment done today so he can discharge tomorrow and do IOP. Writer let him know that due to short staffing it was very unlikely it would get done today but that writer would be back on Sunday and will complete assessment then. Patient says he plans on leaving tomorrow if possible.

## 2021-09-25 LAB
ALBUMIN SERPL-MCNC: 3.2 G/DL (ref 3.4–5)
ALP SERPL-CCNC: 97 U/L (ref 40–150)
ALT SERPL W P-5'-P-CCNC: 241 U/L (ref 0–70)
AST SERPL W P-5'-P-CCNC: 395 U/L (ref 0–45)
BILIRUB DIRECT SERPL-MCNC: 0.4 MG/DL (ref 0–0.2)
BILIRUB SERPL-MCNC: 1.1 MG/DL (ref 0.2–1.3)
HOLD SPECIMEN: NORMAL
PROT SERPL-MCNC: 6.8 G/DL (ref 6.8–8.8)

## 2021-09-25 PROCEDURE — 128N000004 HC R&B CD ADULT

## 2021-09-25 PROCEDURE — 36415 COLL VENOUS BLD VENIPUNCTURE: CPT | Performed by: PHYSICIAN ASSISTANT

## 2021-09-25 PROCEDURE — 80076 HEPATIC FUNCTION PANEL: CPT | Performed by: PHYSICIAN ASSISTANT

## 2021-09-25 PROCEDURE — 250N000013 HC RX MED GY IP 250 OP 250 PS 637: Performed by: PSYCHIATRY & NEUROLOGY

## 2021-09-25 PROCEDURE — 250N000013 HC RX MED GY IP 250 OP 250 PS 637: Performed by: EMERGENCY MEDICINE

## 2021-09-25 PROCEDURE — H2032 ACTIVITY THERAPY, PER 15 MIN: HCPCS

## 2021-09-25 PROCEDURE — 250N000013 HC RX MED GY IP 250 OP 250 PS 637: Performed by: PHYSICIAN ASSISTANT

## 2021-09-25 RX ADMIN — NICOTINE POLACRILEX 4 MG: 2 GUM, CHEWING BUCCAL at 18:07

## 2021-09-25 RX ADMIN — TRAZODONE HYDROCHLORIDE 50 MG: 50 TABLET ORAL at 00:26

## 2021-09-25 RX ADMIN — THIAMINE HCL TAB 100 MG 100 MG: 100 TAB at 09:16

## 2021-09-25 RX ADMIN — NICOTINE POLACRILEX 4 MG: 2 GUM, CHEWING BUCCAL at 14:58

## 2021-09-25 RX ADMIN — TRAZODONE HYDROCHLORIDE 50 MG: 50 TABLET ORAL at 21:28

## 2021-09-25 RX ADMIN — FOLIC ACID 1 MG: 1 TABLET ORAL at 09:16

## 2021-09-25 RX ADMIN — NICOTINE POLACRILEX 4 MG: 2 GUM, CHEWING BUCCAL at 16:34

## 2021-09-25 RX ADMIN — PANTOPRAZOLE SODIUM 40 MG: 40 TABLET, DELAYED RELEASE ORAL at 09:16

## 2021-09-25 RX ADMIN — NICOTINE POLACRILEX 4 MG: 2 GUM, CHEWING BUCCAL at 21:15

## 2021-09-25 RX ADMIN — HYDROXYZINE HYDROCHLORIDE 25 MG: 25 TABLET, FILM COATED ORAL at 13:25

## 2021-09-25 RX ADMIN — MULTIPLE VITAMINS W/ MINERALS TAB 1 TABLET: TAB at 09:16

## 2021-09-25 RX ADMIN — HYDROXYZINE HYDROCHLORIDE 25 MG: 25 TABLET, FILM COATED ORAL at 20:15

## 2021-09-25 RX ADMIN — DIAZEPAM 10 MG: 5 TABLET ORAL at 00:26

## 2021-09-25 RX ADMIN — NICOTINE POLACRILEX 4 MG: 2 GUM, CHEWING BUCCAL at 09:16

## 2021-09-25 RX ADMIN — PANTOPRAZOLE SODIUM 40 MG: 40 TABLET, DELAYED RELEASE ORAL at 16:34

## 2021-09-25 ASSESSMENT — ACTIVITIES OF DAILY LIVING (ADL)
LAUNDRY: WITH SUPERVISION
ORAL_HYGIENE: INDEPENDENT
DRESS: INDEPENDENT
HYGIENE/GROOMING: INDEPENDENT
DRESS: INDEPENDENT
LAUNDRY: WITH SUPERVISION
ORAL_HYGIENE: INDEPENDENT
HYGIENE/GROOMING: INDEPENDENT

## 2021-09-25 NOTE — PLAN OF CARE
"Pt observed out in the milieu, watching television with peers. Presents with blunted affect, anxious.   Claimed having anxiety, rated it as 5/10 and \"minimal\" depression. PRN Hydroxyzine given per request. Reassessed after an hour, pt verbalized \"lessened\" anxiety. Pt denies SI/SIB/HI. Denies experiencing any type of hallucinations.  Consumed 100% of share of dinner and took approximately 720 ml of fluids.   MSSA 6 and 8. Valium 10 mg given this shift.   Complained of diarrhea. Claimed having loose stools 3x this shift. PRN Imodium given at 2153H.  Needs attended to. On withdrawal precautions. Staff will continue to monitor. No further concerns noted as of this time.   Problem: Substance Withdrawal  Goal: Substance Withdrawal  Description: Signs and symptoms of listed problems will be absent or manageable.  Outcome: No Change     "

## 2021-09-25 NOTE — H&P
"Admitted: 09/23/2021    PSYCHIATRIC EVALUATION    The patient was seen for 53 minutes on station 3A of [facility].  Greater than 50% of the time was spent on counseling and coordinating care, clarifying diagnostic and prognostic issues, presence of support in community, patient's plans to maintain sobriety and specifically his plans on going into chemical dependency treatment program.    CHIEF COMPLAINT/REASON FOR ADMISSION:  The patient is a 36-year-old gentleman who was admitted for detoxification from alcohol.    HISTORY OF PRESENT ILLNESS:  The patient presents as a reasonably reliable historian, reports that he has been drinking pretty steadily every day, averaging 1 liter of vodka daily.  Reports experiencing high anxiety when he drinks, but also when he stops drinking feeling shaky and dizzy and nauseated.  Reports difficulties with sleep, restlessness, increased anxiety, low energy, low motivation, but denied presence of suicidal thoughts.  The patient states that he recently had his second DWI and has a pending court for his DWI on coming Monday.  He reports developing tolerance, withdrawal, having difficulties in his family, legal problems, spending an excessive amount of time on using and also recovering from alcohol drinking, and has typical symptoms of alcohol dependence.  He admits to periodically smoking marijuana; that happens in binges ,sometimes he uses every day during the week, sometimes does not use for months.  He denies using any other street drugs.    PAST PSYCHIATRIC HISTORY:  As above.  The patient reports that his first DWI was in 2019.  He did not complete the court ordered sentence yet.  He has never been in chemical dependency treatment program and never been in detox before.  Said that he has been drinking heavily since 2019 or 2018, hopes to go into chemical dependency treatment program and become \"a normal drinker.\"  When asked to explain what he meant, said " "that he tried to drink in moderation as some of his friends.  Reports that he does not believe that AA is helpful \"they just meet together and talk.\"  Patient denied ever experiencing delirium tremens or alcohol-related seizures.    PAST MEDICAL HISTORY:  The patient denies any significant health problems.    ALLERGIES:  DENIED ANY KNOWN MEDICAL ALLERGIES.      Stated that he has never been treated for any mental health conditions with medication and that his wife, who is an LPN, who does not recommend him to take an antidepressant, even despite the fact that he reported having panic attacks.    PHYSICAL EXAMINATION/REVIEW OF SYSTEMS:  For physical examination and 12-point review of systems, please refer to medical consult by Evan Abdalla PA-C from 09/24/2021.  I reviewed this note and agree with it.  VITAL SIGNS:  Temperature 97.9, respirations 16, heart rate 76, blood pressure 142/104.    FAMILY HISTORY AND SOCIAL HISTORY:  The patient stated that he and some of his family members immigrated from [place] [place] to the [place] in [place].  Has 2 sisters; one of his sisters was born in the [place].  Reports that he is , has no children.  He is self-employed.  Reports that grandfather on mother's side was an alcoholic.  Uncle on father's side has a history of alcoholism as well.  He is unaware about any mental health issues in his immediate and extended family.    MENTAL STATUS EXAMINATION:  The patient is an unshaven gentleman who looks tired.  He speaks very good English.  Maintains fair eye contact.  Speech is spontaneous, normal rate, normal volume.  Describes his mood as anxious.  Affect restricted, congruent with mood.  He denies feeling depressed.  Denied suicidal or homicidal thoughts.  Denied auditory or visual hallucinations.  Thought process is linear, goal directed.  Associations tight.  He is alert and oriented x 3.  Fund of knowledge is average with proper usage of vocabulary.  " Ability to focus, concentrate, immediate short and long-term memories appeared to be normal.  Gait and posture all within normal limits.  We did not witness any abnormal involuntary movements, including tremors.  Insight and judgment moderately impaired.    IMPRESSION:    1.  Alcohol use disorder, moderate severity, alcohol withdrawal.  2.  Unspecified anxiety disorder.    TREATMENT PLAN:  The patient presents with symptoms of alcohol withdrawal.  Has mild transaminitis and possibly alcoholic gastritis.  While talking about his plans, he said that he would like to go into chemical dependency treatment program; immediately after that he told this provider that he has no insurance, might not necessarily qualify for [facility].  He also has a court on Monday.  We talked about staying in the hospital for a long enough time to complete alcohol withdrawal and 24 hours after that.  Indicates that he understood that.  Patient was offered antidepressants and some medication for his pretty severe anxiety.  He said that he could consider taking only a very small dose and for only a short period of time, which as I explained to him would not likely be helpful.  We agreed that for now would not start him on any psychotropic medication.  He indicated that he understood and was okay with that plan.      Dani Ramirez MD        D: 2021   T: 2021   MT: [facility]    Name:     ZECHARIAH BLEDSOE  MRN:      [id number]        Account:     [id number]   :      1985           Admitted:    2021       Document: [id number]

## 2021-09-25 NOTE — PLAN OF CARE
Problem: General Rehab Plan of Care  Goal: Therapeutic Recreation/Music Therapy Goal  Description: The patient and/or their representative will achieve their patient-specific goals related to the plan of care.  The patient-specific goals include:    Outcome: No Change    Taet attended art therapy group for 1 hour (10 patients total). Tate engaged in group discussion about positive relationships. He participated in the art activity to make collaborative art about relationships. He interacted appropriately with peers, coming up with a cohesive idea and working together to create the artwork. His affect appeared bright when sharing the art with the rest of the group.

## 2021-09-25 NOTE — PLAN OF CARE
Pt continues to be monitored for alcohol withdrawal. MSSA scores this shift 4 and . Pt denies depression or anxiety. Pt states that he is feeling tired, would like to get CD treatment options figured out. Discussed that  will be here tomorrow.

## 2021-09-26 VITALS
DIASTOLIC BLOOD PRESSURE: 87 MMHG | TEMPERATURE: 97.3 F | SYSTOLIC BLOOD PRESSURE: 124 MMHG | WEIGHT: 170 LBS | BODY MASS INDEX: 24.34 KG/M2 | OXYGEN SATURATION: 97 % | HEIGHT: 70 IN | RESPIRATION RATE: 16 BRPM | HEART RATE: 72 BPM

## 2021-09-26 LAB
ALBUMIN SERPL-MCNC: 3.6 G/DL (ref 3.4–5)
ALP SERPL-CCNC: 114 U/L (ref 40–150)
ALT SERPL W P-5'-P-CCNC: 396 U/L (ref 0–70)
AST SERPL W P-5'-P-CCNC: 523 U/L (ref 0–45)
BILIRUB DIRECT SERPL-MCNC: 0.3 MG/DL (ref 0–0.2)
BILIRUB SERPL-MCNC: 0.7 MG/DL (ref 0.2–1.3)
PROT SERPL-MCNC: 7.8 G/DL (ref 6.8–8.8)

## 2021-09-26 PROCEDURE — 36415 COLL VENOUS BLD VENIPUNCTURE: CPT | Performed by: PHYSICIAN ASSISTANT

## 2021-09-26 PROCEDURE — 250N000013 HC RX MED GY IP 250 OP 250 PS 637: Performed by: PHYSICIAN ASSISTANT

## 2021-09-26 PROCEDURE — 250N000013 HC RX MED GY IP 250 OP 250 PS 637: Performed by: EMERGENCY MEDICINE

## 2021-09-26 PROCEDURE — 250N000013 HC RX MED GY IP 250 OP 250 PS 637: Performed by: PSYCHIATRY & NEUROLOGY

## 2021-09-26 PROCEDURE — 99239 HOSP IP/OBS DSCHRG MGMT >30: CPT | Performed by: PSYCHIATRY & NEUROLOGY

## 2021-09-26 PROCEDURE — 82040 ASSAY OF SERUM ALBUMIN: CPT | Performed by: PHYSICIAN ASSISTANT

## 2021-09-26 RX ORDER — HYDROXYZINE HYDROCHLORIDE 25 MG/1
25 TABLET, FILM COATED ORAL EVERY 4 HOURS PRN
Qty: 60 TABLET | Refills: 1 | Status: ON HOLD | OUTPATIENT
Start: 2021-09-26 | End: 2022-08-12

## 2021-09-26 RX ADMIN — HYDROXYZINE HYDROCHLORIDE 25 MG: 25 TABLET, FILM COATED ORAL at 10:41

## 2021-09-26 RX ADMIN — PANTOPRAZOLE SODIUM 40 MG: 40 TABLET, DELAYED RELEASE ORAL at 10:40

## 2021-09-26 RX ADMIN — MULTIPLE VITAMINS W/ MINERALS TAB 1 TABLET: TAB at 10:40

## 2021-09-26 RX ADMIN — FOLIC ACID 1 MG: 1 TABLET ORAL at 10:41

## 2021-09-26 RX ADMIN — THIAMINE HCL TAB 100 MG 100 MG: 100 TAB at 10:40

## 2021-09-26 NOTE — PLAN OF CARE
Brief medicine note:  -  Pt discharged before he was informed of increasing ALT and AST since admission. This writer called pt after he discharged and discussed his liver enzyme results over his cell phone. He denies any acute physical concern including abd pain and nausea and said he felt great with having a lot of energy. He was instructed to make an appointment with a PCP to be seen next week in order to have repeat hepatic panel and to go to ED in the interim if he develops a fever, abd pain, or severe nausea. All his questions were answered to his satisfaction.     Hamilton Abdalla PA-C  Internal Medicine Hospitalist   [facility]  [phone]

## 2021-09-26 NOTE — PLAN OF CARE
Pt to be discharged home. AVS and labs reviewed with patient, all questions answered and copies sent with patient. Pt discharged with all belongings.

## 2021-09-26 NOTE — PLAN OF CARE
Problem: Substance Withdrawal  Goal: Substance Withdrawal  Description: Signs and symptoms of listed problems will be absent or manageable.  Outcome: Completed  Goal: Social and Therapeutic (Substance Withdrawal)  Description: Signs and symptoms of listed problems will be absent or manageable.  Outcome: Completed     Patient has not required any valium for alcohol detox since 9/25 @ 0026 All MSSA scores since that time have been less than 8. Pt is now removed from alcohol detox monitoring status.

## 2021-09-26 NOTE — PROVIDER NOTIFICATION
After patient's discharge, notified of critical AST, page sent to internal medicine provider.   Vanesa Rivas RN

## 2021-09-26 NOTE — DISCHARGE INSTRUCTIONS
Behavioral Discharge Planning and Instructions  THANK YOU FOR CHOOSING THE [facility]  3A  [phone]    Summary: You were admitted to Station 3A on 9/23/21 for detoxification from alcohol.  A medical exam was performed that included lab work. You have met with a  and declined to complete an assessment.  Please take care and make your recovery a priority, Tate!    Recommendation:  Patient is recommended to complete a Rule 25 assessment in the community and comply with all recommendations.     Main Diagnosis: Per Dr. James MD;  1.  Alcohol withdrawal, rule out alcohol use disorder.  2.  Unspecified anxiety disorder.    Major Treatments, Procedures and Findings:  You were detoxed from alcohol with the Modified Selective Severity Protocol using Valium. You have met with a  to develop a treatment plan for discharge.  You have had labs drawn and a copy of those labs will be sent home with you.  Please bring your lab results with to your follow up doctor appointment.    Symptoms to Report:  If you experience more anxiety, confusion, sleeplessness, deep sadness or thoughts of suicide, notify your treatment team or notify your primary care physician. IF ANY OF THE SYMPTOMS YOU ARE EXPERIENCING ARE A MEDICAL EMERGENCY CALL 911 IMMEDIATELY.     Lifestyle Adjustment: Adjust your lifestyle to get enough sleep, relaxation, exercise and  good nutrition. Continue to develop healthy coping skills to decrease stress and promote a sober living environment. Do not use alcohol, illegal drugs or addictive medications other than what is currently prescribed. AA, NA, and  Sponsor are excellent resources for support.     Primary Provider:  Please follow up with an appointment in 7-14 days to have your liver enzymes re checked.   [facility]  [phone]  [address] [place] [address]    Disposition: Home      Facts about COVID19 at  "www.cdc.gov/COVID19 and www.[place].gov/covid19    Keeping hands clean is one of the most important steps we can take to avoid getting sick and spreading germs to others.  Please wash your hands frequently and lather with soap for at least 20 seconds!      Resources:     Resources for on line recovery meetings:      *due to covid-19 AA/NA meetings are being held online*      AA meetings can be found online; search for them at: http://aa-intergroup.org/directory.php  AA meetings via [employer] for [place] area can be found online at: https://aaRitter Pharmaceuticals.org/find-a-meeting/holiday-closings/  NA meetings via [place] for [place] area can be found online at: https://sites.ParStream.com/view/[place]regionofnarcoticsanonymous/home?authuser=2    Www.[employer]  has online resources for meeting and recovery care including Podcast \"Let's Talk:Addiction & Recovery Podcasts    Www.mnrecBook&Table.org     DISCHARGE RESOURCES:  -SMART Recovery - self management for addiction recovery:  www.smartrecovery.org    -Pathways ~ A Health Crisis Resource & Support Center: 933.461.3562.  -[facility] 908-070-8321   -[facility] 427-371-6836 or 615-624-2755.  -Suicide Awareness Voices of Education (SAVE) (www.save.org): 663-633-LTHY (5633)  -National Suicide Prevention Line (www.mentalhealthmn.org): 712-179-CITR (2239)  -National Meacham on Mental Illness (www.mn.miguel.org): 896.809.1881 or 174-753-8581.  -Qfle7tyjo: text the word LIFE to 79891 for immediate support and crisis intervention  -Mental Health Consumer/Survivor Network of [place] (www.mhcsn.net): 186.611.8427 or 653-181-9071  -Mental Health Association of [place] (www.mentalhealth.org): 106.688.3960 or 675-280-4151     -Substance Abuse and Mental Health Services (www.samhsa.gov)  -Harm Reduction Coalition (www. Harmreduction.org)  -www.prescribetoprevent.org or http://prescribetoprevent.org/video  -Poison control 1-202.895.9642   **[place] Opioid Prevention " Coalition: www.opioidcoalition.org    Sober Support Group Information:  AA/NA & Sponsor/Support  -Alcoholics Anonymous (www.alcoholics-anonymous.org): for local information 24 hours/day  -AA Intergroup service office in [place] (http://www.aastpaul.org/) 497.737.5679  -AA Intergroup service office in [facility]: [phone]. (http://www.aamin[place].org/)  -Narcotics Anonymous (www.naminn[place].org) (529) 110-4968   **Sober Fun Activities: www.soberOrigami Logicactivities.[employer]/[facility]//[facility] Recovery Connection ([facility])  [facility] connects people seeking recovery to resources that help foster and sustain long-term recovery.  Whether you are seeking resources for treatment, transportation, housing, job training, education, health care or other pathways to recovery, [facility] is a great place to start.    Phone: 807.671.1958.  www.[employer] Technology (Great listing of all types of recovery and non-recovery related resources)      General Medication Instructions:   See your medication sheet(s) for instructions.   Take all medicines as directed.  Make no changes unless your doctor suggests them.   Go to all your doctor visits.  Be sure to have all your required lab tests. This way, your medicines can be refilled on time.  Do not use any drugs not prescribed by your provider.  AA/NA and Sponsors are excellent resources for support  Avoid alcohol.    Any follow up concerns:  Nursing questions call the Unit [facility] [phone]  Medical Record call [phone]  Outpatient Behavioral Intake call [phone]  LP+ Wait List/Bed Availability call [phone]    The entire treatment team has appreciated the opportunity to work with you Tate.  We wish you the best in the future and with your lifelong recovery goals. Please bring this discharge folder with you to all follow up appointments.  It contains your lab results, diagnosis, medication list and discharge recommendations.    THANK YOU FOR CHOOSING  THE [facility]

## 2021-09-26 NOTE — PLAN OF CARE
Problem: Adult Inpatient Plan of Care  Goal: Optimal Comfort and Wellbeing  Outcome: No Change     Behavioral  Pt appeared sleeping comfortably overnight; breathing was even and unlabored.      Medical  Pt taken out of detox from alcohol monitoring.      No new medical concerns noted.

## 2021-09-26 NOTE — PLAN OF CARE
"  Problem: Substance Withdrawal  Goal: Substance Withdrawal  Description: Signs and symptoms of listed problems will be absent or manageable.  Outcome: Improving  Flowsheets (Taken 9/25/2021 2135)  Substance Withdrawal Assessed:    orientation    anxiety    memory deficits    insight    suicidality    thought process    psychomotor activity    affect    mood    speech    sleep   Patient visible in milieu, social with peers. Patient attended offered group. Patient affect full range, mood is calm. Patient denies SI, SIB, HI, or hallucinations. Patient continues to be monitored for alcohol withdrawal. Patient MSSA scores 3 and 4. Patient VSS, appetite good, denies pain . Patient requested prn hydroxyzine times one for anxiety, and requested prn trazodone 50 mg times one for sleep. Patient expressed desire to discharge tomorrow. Patient accepting he needs to request discharge in the am. Patient denies any additional concerns. /83 (BP Location: Left arm)   Pulse 81   Temp 97.6  F (36.4  C) (Temporal)   Resp 16   Ht 1.778 m (5' 10\")   Wt 77.1 kg (170 lb)   SpO2 99%   BMI 24.39 kg/m     "

## 2021-09-26 NOTE — DISCHARGE SUMMARY
Service Date: 09/26/2021  Discharge Date: 09/26/2021    The patient was hospitalized between 09/23/2021 and 09/26/2021.  On the day the day of discharge, the patient was seen for 33 minutes in presence of PA student.  Greater than 50% of the time was spent on counseling and coordinating care, clarifying diagnostic and prognostic issues, presence of support in community.    CHIEF COMPLAINT AND REASON FOR ADMISSION:  The patient is a 36-year-old gentleman who was admitted for detox from alcohol.  The patient reports that he has been drinking pretty steadily every single day, averaging 1 liter of vodka daily.  Reports experiencing high anxiety when he drinks, but also when he stops drinking.  Reports feeling shaky, dizzy and nauseated.  He reports difficulties with sleep, restlessness, increased anxiety, low energy, low motivation, but denied presence of suicidal thoughts and does not believe that he is severely depressed.  The patient states that he recently had his second DWI, has a pending court for his DWI on coming Monday.  He reports developing tolerance, withdrawal, having difficulties in his family, legal problems, spending excessive amount of time on using and also recovering from alcohol, and other typical symptoms of alcohol dependence.  For more details about patient's presentation and past psychiatric history, please refer to Dr. Dani Ramirez's note from 09/24/2021.    DISCHARGE DIAGNOSES:    1.  Alcohol use disorder with alcohol withdrawal.    2.  Unspecified anxiety disorder.  3.  Unspecified depressive disorder.    CONSULTS:  The patient was seen by Internal Medicine as routine evaluation for anyone admitted to station 3A.   Internal Medicine agreed with treatment for alcohol abuse and withdrawal, felt that acute epigastric abdominal tenderness and intermittent pain with melena was likely due to alcohol-induced gastritis, felt that mild transaminitis was likely due to heavy alcohol use,  "empirically started the patient on Protonix 40 mg 2 times a day for 30 days for his likely alcohol-induced gastritis.  I appreciate Internal Medicine's help with this patient.    LAB WORK:  Comprehensive metabolic battery initially showed urine nitrogen 6 (low), , .  Fasting lipid panel showed elevated cholesterol 224.  GGT was quite elevated at 607.  The rest of fasting panel was unremarkable.  TSH was normal.  Glucose 130 (elevated).  Red blood cell count 4.26.  INR 0.91.  COVID-19 nasopharyngeal swab negative.  Ethanol 0.23 g/dL.  Liver function test was repeated on 2 occasions.  On 09/25/2021, albumin was 3.2, low.  ALT was 241, .  Bilirubin direct was 0.4, elevated.  On 09/26/2021 ALT was 396, AST was 523, and bilirubin direct was 0.3. The above test was done in the late morning.  Final results were not discussed with the patient, as he already left.    HOSPITAL COURSE:  The patient presented as overall cooperative; however, at the same time stated that he would like to not completely quit drinking but rather remain moderate drinker, drinking with his friends and other people around him.  He initially stated that he would like to go into chemical dependency treatment program, however was concerned that he did not have medical insurance and would not necessarily qualify for [facility], also was concerned about court for his second DWI on Monday.\"  He was treated for alcohol withdrawal with diazepam.  Reported feeling significantly better, was thankful for the care provided to him.  He was seen a second time by this provider on the day of his discharge, the patient reported feeling significantly better.  Today he openly stated that he would not go into chemical dependency treatment program but would rather go to  and will try to stay completely sober.  We advised him that option of going into formal chemical dependency treatment program was still available to him.  Per patient request, " all of his medications were discontinued with the exception of hydroxyzine -- said he found helpful for anxiety -- and hydroxyzine 25 mg every 4 hours as needed for anxiety was the only discharge medication.  [facility] patient request[facility].  The patient was advised to have a followup appointment at [facility] in 7-14 days to have liver enzymes checked.      Dani Ramirez MD        D: 2021   T: 2021   MT: SHERIDAN    Name:     ZECHARIAH BLEDSOE  MRN:      [id number]        Account:      [id number]   :      1985           Service Date: 2021                                  Discharge Date: 2021     Document: [id number]

## 2022-08-11 ENCOUNTER — HOSPITAL ENCOUNTER (INPATIENT)
Facility: CLINIC | Age: 37
LOS: 2 days | Discharge: HOME | DRG: 897 | End: 2022-08-14
Attending: EMERGENCY MEDICINE | Admitting: PSYCHIATRY & NEUROLOGY

## 2022-08-11 DIAGNOSIS — F10.220 ALCOHOL DEPENDENCE WITH UNCOMPLICATED INTOXICATION (H): ICD-10-CM

## 2022-08-11 DIAGNOSIS — Z20.822 CONTACT WITH AND (SUSPECTED) EXPOSURE TO COVID-19: ICD-10-CM

## 2022-08-11 LAB
ALBUMIN SERPL-MCNC: 3.8 G/DL (ref 3.4–5)
ALCOHOL BREATH TEST: 0.25 (ref 0–0.01)
ALP SERPL-CCNC: 114 U/L (ref 40–150)
ALT SERPL W P-5'-P-CCNC: 174 U/L (ref 0–70)
AMPHETAMINES UR QL SCN: NORMAL
AST SERPL W P-5'-P-CCNC: 242 U/L (ref 0–45)
BARBITURATES UR QL: NORMAL
BENZODIAZ UR QL: NORMAL
BILIRUB DIRECT SERPL-MCNC: 0.2 MG/DL (ref 0–0.2)
BILIRUB SERPL-MCNC: 0.6 MG/DL (ref 0.2–1.3)
CANNABINOIDS UR QL SCN: NORMAL
COCAINE UR QL: NORMAL
OPIATES UR QL SCN: NORMAL
PROT SERPL-MCNC: 8 G/DL (ref 6.8–8.8)

## 2022-08-11 PROCEDURE — C9803 HOPD COVID-19 SPEC COLLECT: HCPCS | Performed by: EMERGENCY MEDICINE

## 2022-08-11 PROCEDURE — 99285 EMERGENCY DEPT VISIT HI MDM: CPT | Performed by: EMERGENCY MEDICINE

## 2022-08-11 PROCEDURE — 82075 ASSAY OF BREATH ETHANOL: CPT | Performed by: EMERGENCY MEDICINE

## 2022-08-11 PROCEDURE — 80076 HEPATIC FUNCTION PANEL: CPT | Performed by: EMERGENCY MEDICINE

## 2022-08-11 PROCEDURE — 87635 SARS-COV-2 COVID-19 AMP PRB: CPT | Performed by: EMERGENCY MEDICINE

## 2022-08-11 PROCEDURE — 82977 ASSAY OF GGT: CPT | Performed by: PSYCHIATRY & NEUROLOGY

## 2022-08-11 PROCEDURE — 80061 LIPID PANEL: CPT | Performed by: PSYCHIATRY & NEUROLOGY

## 2022-08-11 PROCEDURE — 36415 COLL VENOUS BLD VENIPUNCTURE: CPT | Performed by: EMERGENCY MEDICINE

## 2022-08-11 PROCEDURE — 80307 DRUG TEST PRSMV CHEM ANLYZR: CPT | Performed by: EMERGENCY MEDICINE

## 2022-08-11 PROCEDURE — 84443 ASSAY THYROID STIM HORMONE: CPT | Performed by: PSYCHIATRY & NEUROLOGY

## 2022-08-11 PROCEDURE — 99284 EMERGENCY DEPT VISIT MOD MDM: CPT | Performed by: EMERGENCY MEDICINE

## 2022-08-11 RX ORDER — MULTIPLE VITAMINS W/ MINERALS TAB 9MG-400MCG
1 TAB ORAL DAILY
Status: DISCONTINUED | OUTPATIENT
Start: 2022-08-12 | End: 2022-08-12

## 2022-08-11 RX ORDER — ATENOLOL 50 MG/1
50 TABLET ORAL DAILY PRN
Status: DISCONTINUED | OUTPATIENT
Start: 2022-08-11 | End: 2022-08-12

## 2022-08-11 RX ORDER — DIAZEPAM 5 MG
5-20 TABLET ORAL EVERY 30 MIN PRN
Status: DISCONTINUED | OUTPATIENT
Start: 2022-08-11 | End: 2022-08-12

## 2022-08-11 RX ORDER — FOLIC ACID 1 MG/1
1 TABLET ORAL DAILY
Status: DISCONTINUED | OUTPATIENT
Start: 2022-08-12 | End: 2022-08-12

## 2022-08-11 ASSESSMENT — ENCOUNTER SYMPTOMS
VOMITING: 0
DIARRHEA: 0
ABDOMINAL PAIN: 0
NAUSEA: 0

## 2022-08-11 ASSESSMENT — ACTIVITIES OF DAILY LIVING (ADL): ADLS_ACUITY_SCORE: 35

## 2022-08-12 ENCOUNTER — TELEPHONE (OUTPATIENT)
Dept: BEHAVIORAL HEALTH | Facility: CLINIC | Age: 37
End: 2022-08-12

## 2022-08-12 PROBLEM — F10.20 ALCOHOL USE DISORDER, SEVERE, DEPENDENCE (H): Status: ACTIVE | Noted: 2022-08-12

## 2022-08-12 PROBLEM — F10.980 ALCOHOL-INDUCED ANXIETY DISORDER (H): Status: ACTIVE | Noted: 2022-08-12

## 2022-08-12 PROBLEM — F17.200 NICOTINE USE DISORDER: Status: ACTIVE | Noted: 2022-08-12

## 2022-08-12 PROBLEM — F10.220 ALCOHOL DEPENDENCE WITH UNCOMPLICATED INTOXICATION (H): Status: ACTIVE | Noted: 2022-08-12

## 2022-08-12 PROBLEM — F10.939 ALCOHOL WITHDRAWAL SYNDROME WITH COMPLICATION (H): Status: ACTIVE | Noted: 2022-08-12

## 2022-08-12 PROBLEM — F10.94 ALCOHOL-INDUCED MOOD DISORDER (H): Status: ACTIVE | Noted: 2022-08-12

## 2022-08-12 LAB
CHOLEST SERPL-MCNC: 251 MG/DL
GGT SERPL-CCNC: 1172 U/L (ref 0–75)
HDLC SERPL-MCNC: 111 MG/DL
LDLC SERPL CALC-MCNC: 113 MG/DL
NONHDLC SERPL-MCNC: 140 MG/DL
SARS-COV-2 RNA RESP QL NAA+PROBE: NEGATIVE
TRIGL SERPL-MCNC: 137 MG/DL
TSH SERPL DL<=0.005 MIU/L-ACNC: 2.73 MU/L (ref 0.4–4)

## 2022-08-12 PROCEDURE — 128N000004 HC R&B CD ADULT

## 2022-08-12 PROCEDURE — 250N000013 HC RX MED GY IP 250 OP 250 PS 637: Performed by: EMERGENCY MEDICINE

## 2022-08-12 PROCEDURE — 99221 1ST HOSP IP/OBS SF/LOW 40: CPT

## 2022-08-12 PROCEDURE — 99223 1ST HOSP IP/OBS HIGH 75: CPT | Mod: AI | Performed by: PSYCHIATRY & NEUROLOGY

## 2022-08-12 PROCEDURE — 250N000013 HC RX MED GY IP 250 OP 250 PS 637: Performed by: PSYCHIATRY & NEUROLOGY

## 2022-08-12 PROCEDURE — HZ2ZZZZ DETOXIFICATION SERVICES FOR SUBSTANCE ABUSE TREATMENT: ICD-10-PCS | Performed by: PSYCHIATRY & NEUROLOGY

## 2022-08-12 RX ORDER — NICOTINE 21 MG/24HR
1 PATCH, TRANSDERMAL 24 HOURS TRANSDERMAL DAILY
Status: DISCONTINUED | OUTPATIENT
Start: 2022-08-12 | End: 2022-08-14 | Stop reason: HOSPADM

## 2022-08-12 RX ORDER — MAGNESIUM HYDROXIDE/ALUMINUM HYDROXICE/SIMETHICONE 120; 1200; 1200 MG/30ML; MG/30ML; MG/30ML
30 SUSPENSION ORAL EVERY 4 HOURS PRN
Status: DISCONTINUED | OUTPATIENT
Start: 2022-08-12 | End: 2022-08-14 | Stop reason: HOSPADM

## 2022-08-12 RX ORDER — DIAZEPAM 5 MG
5-20 TABLET ORAL EVERY 30 MIN PRN
Status: DISCONTINUED | OUTPATIENT
Start: 2022-08-12 | End: 2022-08-14 | Stop reason: HOSPADM

## 2022-08-12 RX ORDER — FOLIC ACID 1 MG/1
1 TABLET ORAL DAILY
Status: DISCONTINUED | OUTPATIENT
Start: 2022-08-12 | End: 2022-08-14 | Stop reason: HOSPADM

## 2022-08-12 RX ORDER — ONDANSETRON 4 MG/1
4 TABLET, ORALLY DISINTEGRATING ORAL EVERY 6 HOURS PRN
Status: DISCONTINUED | OUTPATIENT
Start: 2022-08-12 | End: 2022-08-14 | Stop reason: HOSPADM

## 2022-08-12 RX ORDER — ATENOLOL 50 MG/1
50 TABLET ORAL DAILY PRN
Status: DISCONTINUED | OUTPATIENT
Start: 2022-08-12 | End: 2022-08-14 | Stop reason: HOSPADM

## 2022-08-12 RX ORDER — AMOXICILLIN 250 MG
1 CAPSULE ORAL 2 TIMES DAILY PRN
Status: DISCONTINUED | OUTPATIENT
Start: 2022-08-12 | End: 2022-08-14 | Stop reason: HOSPADM

## 2022-08-12 RX ORDER — LOPERAMIDE HCL 2 MG
2 CAPSULE ORAL 4 TIMES DAILY PRN
Status: DISCONTINUED | OUTPATIENT
Start: 2022-08-12 | End: 2022-08-14 | Stop reason: HOSPADM

## 2022-08-12 RX ORDER — MULTIPLE VITAMINS W/ MINERALS TAB 9MG-400MCG
1 TAB ORAL DAILY
Status: DISCONTINUED | OUTPATIENT
Start: 2022-08-12 | End: 2022-08-14 | Stop reason: HOSPADM

## 2022-08-12 RX ORDER — TRAZODONE HYDROCHLORIDE 50 MG/1
50 TABLET, FILM COATED ORAL
Status: DISCONTINUED | OUTPATIENT
Start: 2022-08-12 | End: 2022-08-14 | Stop reason: HOSPADM

## 2022-08-12 RX ORDER — HYDROXYZINE HYDROCHLORIDE 25 MG/1
25 TABLET, FILM COATED ORAL EVERY 4 HOURS PRN
Status: DISCONTINUED | OUTPATIENT
Start: 2022-08-12 | End: 2022-08-14 | Stop reason: HOSPADM

## 2022-08-12 RX ORDER — ACETAMINOPHEN 325 MG/1
650 TABLET ORAL EVERY 4 HOURS PRN
Status: DISCONTINUED | OUTPATIENT
Start: 2022-08-12 | End: 2022-08-14 | Stop reason: HOSPADM

## 2022-08-12 RX ADMIN — FOLIC ACID 1 MG: 1 TABLET ORAL at 09:10

## 2022-08-12 RX ADMIN — NICOTINE POLACRILEX 2 MG: 2 GUM, CHEWING BUCCAL at 18:17

## 2022-08-12 RX ADMIN — DIAZEPAM 10 MG: 5 TABLET ORAL at 11:42

## 2022-08-12 RX ADMIN — NICOTINE 1 PATCH: 21 PATCH, EXTENDED RELEASE TRANSDERMAL at 09:10

## 2022-08-12 RX ADMIN — NICOTINE POLACRILEX 2 MG: 2 GUM, CHEWING BUCCAL at 09:10

## 2022-08-12 RX ADMIN — THIAMINE HCL TAB 100 MG 100 MG: 100 TAB at 09:10

## 2022-08-12 RX ADMIN — NICOTINE POLACRILEX 2 MG: 2 GUM, CHEWING BUCCAL at 14:40

## 2022-08-12 RX ADMIN — HYDROXYZINE HYDROCHLORIDE 25 MG: 25 TABLET, FILM COATED ORAL at 12:21

## 2022-08-12 RX ADMIN — DIAZEPAM 10 MG: 5 TABLET ORAL at 14:14

## 2022-08-12 RX ADMIN — DIAZEPAM 10 MG: 5 TABLET ORAL at 09:10

## 2022-08-12 RX ADMIN — DIAZEPAM 10 MG: 5 TABLET ORAL at 16:33

## 2022-08-12 RX ADMIN — DIAZEPAM 10 MG: 5 TABLET ORAL at 03:02

## 2022-08-12 RX ADMIN — MULTIPLE VITAMINS W/ MINERALS TAB 1 TABLET: TAB at 09:10

## 2022-08-12 RX ADMIN — HYDROXYZINE HYDROCHLORIDE 25 MG: 25 TABLET, FILM COATED ORAL at 20:30

## 2022-08-12 RX ADMIN — DIAZEPAM 10 MG: 5 TABLET ORAL at 05:52

## 2022-08-12 RX ADMIN — DIAZEPAM 10 MG: 5 TABLET ORAL at 04:25

## 2022-08-12 RX ADMIN — TRAZODONE HYDROCHLORIDE 50 MG: 50 TABLET ORAL at 21:47

## 2022-08-12 ASSESSMENT — ACTIVITIES OF DAILY LIVING (ADL)
DRESSING/BATHING_DIFFICULTY: NO
CHANGE_IN_FUNCTIONAL_STATUS_SINCE_ONSET_OF_CURRENT_ILLNESS/INJURY: NO
DIFFICULTY_EATING/SWALLOWING: NO
DRESS: INDEPENDENT
CONCENTRATING,_REMEMBERING_OR_MAKING_DECISIONS_DIFFICULTY: NO
WEAR_GLASSES_OR_BLIND: NO
ADLS_ACUITY_SCORE: 37
ADLS_ACUITY_SCORE: 40
TOILETING_ISSUES: NO
ADLS_ACUITY_SCORE: 37
ADLS_ACUITY_SCORE: 40
ADLS_ACUITY_SCORE: 40
FALL_HISTORY_WITHIN_LAST_SIX_MONTHS: NO
HYGIENE/GROOMING: INDEPENDENT
ADLS_ACUITY_SCORE: 40
DOING_ERRANDS_INDEPENDENTLY_DIFFICULTY: NO
WALKING_OR_CLIMBING_STAIRS_DIFFICULTY: NO
ADLS_ACUITY_SCORE: 40
ADLS_ACUITY_SCORE: 37
ADLS_ACUITY_SCORE: 40
ORAL_HYGIENE: INDEPENDENT

## 2022-08-12 NOTE — ED PROVIDER NOTES
ED Provider Note  [facility]      History     Chief Complaint   Patient presents with     Addiction Problem     Pt seeking detox from alcohol, last drink PTA.  Pt drinks about 750 mL to a L of hard liquor daily, for two years.  Pt has not had any w/d seizures.  Pt blew .254 breathalyzer at triage.  Pt went to [facility] here seeking detox.     The history is provided by the patient and medical records.     Tate Crocker is a 37 year old male with history of alcohol use disorder, anxiety, and depression presenting to the ED seeking alcohol detox. Patient was seen at [facility] prior to this seeking detox, however, was told that they do not have a detox program so presents here. Last drink was just prior to arrival. He drinks between 1.75-2 L of different liquors daily for the past 2.5 years. He had a brief period of sobriety 1 year ago, but relapsed quickly after. He uses no other substances. He has not been eating solids, just drinking. No nausea, vomiting, diarrhea, abdominal pain. He becomes very shaky when withdrawing. No history of seizures, DT's, or hallucinations. He is  from his wife and endorses passive SI with no intent or plan.     He had labs at [facility] earlier today:   CHEMISTRY, COMMON  [facility]  08/11/2022  Component      SODIUM   POTASSIUM   CHLORIDE   CO2,TOTAL   ANION GAP   GLUCOSE   CALCIUM   BUN   CREATININE   BUN/CREAT RATIO             eGFR     Component 08/11/2022       SODIUM 135   POTASSIUM 4.1   CHLORIDE 102   CO2,TOTAL 20 Low       ANION GAP 13   GLUCOSE 124 High       CALCIUM 9.3   BUN 5 Low       CREATININE 0.87   BUN/CREAT RATIO           6 Low       eGFR >90       ETHANOL  [facility]  08/11/2022                        HEMATOLOGY  [facility]  08/11/2022  Component      WHITE BLOOD COUNT           RED BLOOD COUNT             HEMOGLOBIN                   HEMATOCRIT                  MCV                         MCH                         MCHC                        RDW                         PLATELET COUNT              MPV                         NRBC                        ABS NRBC     Component 08/11/2022       WHITE BLOOD COUNT         5.8   RED BLOOD COUNT           4.40   HEMOGLOBIN                14.9   HEMATOCRIT                42.4   MCV                       96   MCH                       33.9   MCHC                      35.1   RDW                       12.4   PLATELET COUNT            170   MPV                       10.9   NRBC                      0.0   ABS NRBC        Past Medical History  History reviewed. No pertinent past medical history.  History reviewed. No pertinent surgical history.  hydrOXYzine (ATARAX) 25 MG tablet      Allergies   Allergen Reactions     Metal [Staples]      Rash      Family History  History reviewed. No pertinent family history.  Social History   Social History     Tobacco Use     Smoking status: Current Every Day Smoker     Types: Vaping Device     Smokeless tobacco: Never Used   Substance Use Topics     Alcohol use: Yes     Comment: Pt drinks daily .50 -1 L     Drug use: No      Past medical history, past surgical history, medications, allergies, family history, and social history were reviewed with the patient. No additional pertinent items.       Review of Systems   Gastrointestinal: Negative for abdominal pain, diarrhea, nausea and vomiting.   Psychiatric/Behavioral: Positive for suicidal ideas (passive).     A complete review of systems was performed with pertinent positives and negatives noted in the HPI, and all other systems negative.    Physical Exam   BP: (!) 140/101  Pulse: 81  Temp: 97.8  F (36.6  C)  Resp: 18  Height: 182.9 cm (6')  Weight: 81.5 kg (179 lb 11.2 oz)  SpO2: 97 %  Physical Exam  Vitals and nursing note reviewed.   Constitutional:       Appearance: He is normal weight.   HENT:      Head:  Normocephalic and atraumatic.      Nose: No congestion or rhinorrhea.   Eyes:      General: No scleral icterus.     Extraocular Movements: Extraocular movements intact.   Cardiovascular:      Rate and Rhythm: Normal rate and regular rhythm.      Heart sounds: Normal heart sounds.   Pulmonary:      Effort: Pulmonary effort is normal.      Breath sounds: Normal breath sounds.   Abdominal:      General: There is no distension.      Palpations: Abdomen is soft. There is no mass.      Tenderness: There is no abdominal tenderness. There is no guarding or rebound.      Hernia: No hernia is present.   Musculoskeletal:         General: Normal range of motion.      Cervical back: Normal range of motion.   Skin:     General: Skin is warm and dry.      Coloration: Skin is not jaundiced.   Neurological:      General: No focal deficit present.      Mental Status: He is alert and oriented to person, place, and time.   Psychiatric:         Attention and Perception: Attention and perception normal.         Mood and Affect: Mood and affect normal.         Speech: Speech normal.         Behavior: Behavior normal. Behavior is cooperative.         Thought Content: Thought content normal.         Cognition and Memory: Cognition and memory normal.         Judgment: Judgment normal.         ED Course      Procedures       The medical record was reviewed and interpreted.  Current labs reviewed and interpreted.  Previous labs reviewed and interpreted.  Mental Health Risk Assessment      PSS-3    Date and Time Over the past 2 weeks have you felt down, depressed, or hopeless? Over the past 2 weeks have you had thoughts of killing yourself? Have you ever attempted to kill yourself? When did this last happen? User   08/11/22 1880 yes yes no -- EAB      C-SSRS ([place])    Date and Time Q1 Wished to be Dead (Past Month) Q2 Suicidal Thoughts (Past Month) Q3 Suicidal Thought Method Q4 Suicidal Intent without Specific Plan Q5 Suicide Intent with  Specific Plan Q6 Suicide Behavior (Lifetime) Within the Past 3 Months? RETIRED: Level of Risk per Screen Screening Not Complete User   08/11/22 2088 yes yes no no no no -- -- -- EAB                Item Assessment   Suicidal Ideation None   Plan none   Intent none   Suicidal or self-harm behaviors none   Risk Factors Substance abuse or dependence   Protective Factors Responsibility to family or others; living with family              Results for orders placed or performed during the hospital encounter of 08/11/22   Asymptomatic COVID-19 Virus (Coronavirus) by PCR Nasopharyngeal     Status: Normal    Specimen: Nasopharyngeal; Swab   Result Value Ref Range    SARS CoV2 PCR Negative Negative    Narrative    Testing was performed using the caden  SARS-CoV-2 & Influenza A/B Assay on the caden  Evelyn  System.  This test should be ordered for the detection of SARS-COV-2 in individuals who meet SARS-CoV-2 clinical and/or epidemiological criteria. Test performance is unknown in asymptomatic patients.  This test is for in vitro diagnostic use under the FDA EUA for laboratories certified under CLIA to perform moderate and/or high complexity testing. This test has not been FDA cleared or approved.  A negative test does not rule out the presence of PCR inhibitors in the specimen or target RNA in concentration below the limit of detection for the assay. The possibility of a false negative should be considered if the patient's recent exposure or clinical presentation suggests COVID-19.  [facility] are certified under the Clinical Laboratory Improvement Amendments of 1988 (CLIA-88) as qualified to perform moderate and/or high complexity laboratory testing.   Hepatic panel     Status: Abnormal   Result Value Ref Range    Bilirubin Total 0.6 0.2 - 1.3 mg/dL    Bilirubin Direct 0.2 0.0 - 0.2 mg/dL    Protein Total 8.0 6.8 - 8.8 g/dL    Albumin 3.8 3.4 - 5.0 g/dL    Alkaline Phosphatase 114 40 - 150 U/L     (H)  0 - 45 U/L     (H) 0 - 70 U/L   Drug abuse screen 1 urine (ED)     Status: Normal   Result Value Ref Range    Amphetamines Urine Screen Negative Screen Negative    Barbiturates Urine Screen Negative Screen Negative    Benzodiazepines Urine Screen Negative Screen Negative    Cannabinoids Urine Screen Negative Screen Negative    Cocaine Urine Screen Negative Screen Negative    Opiates Urine Screen Negative Screen Negative   Alcohol breath test POCT     Status: Abnormal   Result Value Ref Range    Alcohol Breath Test 0.254 (A) 0.00 - 0.01   Urine Drugs of Abuse Screen     Status: Normal    Narrative    The following orders were created for panel order Urine Drugs of Abuse Screen.  Procedure                               Abnormality         Status                     ---------                               -----------         ------                     Drug abuse screen 1 urin...[[id number]]  Normal              Final result                 Please view results for these tests on the individual orders.     Medications   diazepam (VALIUM) tablet 5-20 mg (has no administration in time range)   atenolol (TENORMIN) tablet 50 mg (has no administration in time range)   thiamine (B-1) tablet 100 mg (has no administration in time range)   folic acid (FOLVITE) tablet 1 mg (has no administration in time range)   multivitamin w/minerals (THERA-VIT-M) tablet 1 tablet (has no administration in time range)        Assessments & Plan (with Medical Decision Making)    Tate Crocker is a 37 year old male with history of alcohol use disorder, anxiety, and depression presenting to the ED seeking alcohol detox.  He denies withdrawal seizures or hallucinations but gets very shaky when withdrawing.  Last drink prior to arrival.  He was at w prior and they did blood work but don't have detox so they discharged him.  Those labs and further labs ordered and reviewed.  [facility] protocol was initiated. No acute concerns on lebs.  He is  medically appropriate for detox. This is a voluntary admit.     I have reviewed the nursing notes. I have reviewed the findings, diagnosis, plan and need for follow up with the patient.    New Prescriptions    No medications on file       Final diagnoses:   Alcohol dependence with uncomplicated intoxication (H)   I, Shanti Blackburn, am serving as a trained medical scribe to document services personally performed by Griselda Corona MD, based on the provider's statements to me.     I, Griselda Corona MD, was physically present and have reviewed and verified the accuracy of this note documented by Shanti Blackburn.      --  Griselda Cornoa MD  [facility] EMERGENCY DEPARTMENT  8/11/2022     Griselda Corona MD  08/12/22 0008       Griselda Corona MD  08/12/22 0009

## 2022-08-12 NOTE — PLAN OF CARE
"Goal Outcome Evaluation:    Plan of Care Reviewed With: patient     Overall Patient Progress: improving    Pt continues to be monitored for alcohol withdrawal. MSSA = 10, 8, and 8 this shift. He received valium 10 mg x 3. He has received valium 60 mg over the past 12 hours since arriving at the hospital.  Pt symptoms are mild tachycardia, tremors, and anxiety.  He is visible in the lounge. He is pleasant and cooperative. He denies SI/SIB/HI.  Discharge plan is still being formulated.  BP (!) 141/96   Pulse 103   Temp 97.8  F (36.6  C) (Temporal)   Resp 16   Ht 1.803 m (5' 11\")   Wt 80.7 kg (178 lb)   SpO2 95%   BMI 24.83 kg/m            "

## 2022-08-12 NOTE — CONSULTS
TELEPSYCHIATRY TELEPHONE ADMISSION NOTE    Discussed with nurse Karen.  37-year-old male presented with alcohol withdrawal.  No SI/HI.  Patient has been medically cleared.  PMH negative for significant medical illness.  Allergy to metal.  No known serious substance withdrawal reactions.      Lab findings: COVID negative. UDS negative. Alcohol level: 0.254.     PLAN:    --Disposition: Admit to detox.  Routine orders placed.      --Precautions: Suicide.  --Withdrawal management: Valium detox.          Rashid Ward MD  Psychiatrist

## 2022-08-12 NOTE — ED TRIAGE NOTES
Pt seeking detox from alcohol, last drink PTA.  Pt drinks about 750 mL to a L of hard liquor daily, for two years.  Pt has not had any w/d seizures.  Pt blew .254 breathalyzer at triage.  Pt went to [facility] here seeking detox.     Triage Assessment     Row Name 08/11/22 2979       Triage Assessment (Adult)    Airway WDL WDL       Respiratory WDL    Respiratory WDL WDL       Skin Circulation/Temperature WDL    Skin Circulation/Temperature WDL WDL       Cardiac WDL    Cardiac WDL WDL       Peripheral/Neurovascular WDL    Peripheral Neurovascular WDL WDL       Cognitive/Neuro/Behavioral WDL    Cognitive/Neuro/Behavioral WDL WDL

## 2022-08-12 NOTE — PROGRESS NOTES
08/12/22 0530   Patient Belongings   Did you bring any home meds/supplements to the hospital?  No   Patient Belongings locker;remains with patient   Patient Belongings Remaining with Patient other (see comments)   Patient Belongings Put in Hospital Secure Location (Security or Locker, etc.) cash/credit card;cell phone/electronics;clothing;necklace;watch   Belongings Search Yes   Clothing Search Yes   Second Staff Kenneth   Comment All of patient's belongings are in a bin, his discharge bin and in his room. His important cards, a watch,  a white necklace with a white pendant are in a security envelop.     BIN:  Shorts, shirt, a cap and a vape.    DISCHARGE BIN: Phone    ROOM: [facility] ENV # [id number] - [place] ID, 2 VISA Cards. A watch and a white necklace with a white pendant.    A               Admission:  I am responsible for any personal items that are not sent to the safe or pharmacy.  [facility] is not responsible for loss, theft or damage of any property in my possession.    Signature:  _________________________________ Date: _______  Time: _____                                              Staff Signature:  ____________________________ Date: ________  Time: _____      2nd Staff person, if patient is unable/unwilling to sign:    Signature: ________________________________ Date: ________  Time: _____     Discharge:  [facility] has returned all of my personal belongings:    Signature: _________________________________ Date: ________  Time: _____                                          Staff Signature:  ____________________________ Date: ________  Time: _____

## 2022-08-12 NOTE — TELEPHONE ENCOUNTER
Patient cleared and ready for behavioral bed placement: Yes   S: 37/M, [place] [facility], alcohol detox    B: Pt reports drinking 1.75 L of hard liquor for the past 2.5 years. LD just PTA. Breathalyzer 0.254. Pt denies w/d szs and DTs. Pt exhibits shakiness. Pt denies other drug use. Hx of depression and anxiety - pt endorses passive SI but denies plan or intent. Stressor: separation from wife. No acute medical concerns. Medically cleared, eating, drinking, ambulating indep.     A: Voluntary    Covid test neg  Pt was seen at [facility] earlier today to seek detox - Labwork was obtained at [facility]. See chart or Dr. Corona's note.     R: 3A / Gene / CD  00:26 - Called [facility]. Awaiting callback from provider. 00:31 - Dr. Ward accepts for detox. Placed pt in queue. 00:32 - Notified unit. RN will call ED for report. 00:36 - Notified ED

## 2022-08-12 NOTE — PHARMACY-ADMISSION MEDICATION HISTORY
Admission Medication History Completed by Pharmacy    See [facility] Admission Navigator for prior to admission medications.     Medication History Sources:     Tate    Changes made to PTA medication list (reason):    Added: None    Deleted: None    Changed: None    Additional Information:    Tate reported he is not currently taking any medications.    Prior to Admission medications    None      The information provided in this note is only as accurate as the sources available at the time of the update(s).    Date completed: 08/12/22    Medication history completed by: Yareli Atkins RPH

## 2022-08-12 NOTE — CONSULTS
[facility]  Consult Note - Hospitalist Service  Date of Admission:  8/11/2022  Consult Requested by: Dr. Song  Reason for Consult: H&P    Assessment & Plan   Tate Crocker is a 37 year old male with a history of alcohol use disorder, who was admitted to [facility] on 8/11/2022 for alcohol detox. Medicine was consulted for medical co-management.    Alcohol Withdrawal  Alcohol Use Disorder  Patient reports drinking ~1 L of various alcohols daily. Alcohol breath test of 0.254 on admission. Denies history of withdrawal seizures or DT. MSSA of 10 this shift thus far.  - Per psychiatry   - Agree with folic acid, multivitamin, and thiamine   - Agree with [facility] protocol    Transaminitis  AST and ALT elevated on admission, actually lower than previous values in September 2021. No Alk phos or t bili elevation. Overall, likely due to alcohol use. On exam, does have abdominal tenderness noted throughout. Overall hemodynamically stable, and labs improved from last time. No further monitoring indicated while inpatient.  - If patient develops worsening abdominal pain, unable to tolerate oral diet, becomes hemodynamically unstable, please notify medicine  - Follow up with PCP on discharge to ensure resolution  - Encourage alcohol cessation    Dyslipidemia  On admission, lipid panel with dyslipidemia. Likely not a fasting lab draw (done late at night). No further work-up as inpatient warranted.   - Follow up with PCP on discharge for repeat fasting lipid panel     The patient's care was discussed with the Bedside Nurse and Patient.    Medicine will sign off now. Thank you for allowing us to be a part of this patient's care. Please notify on call DARIO if any medical issues arise.     SAMANTHA [facility]  Securely message with the Vocera Web Console (learn more here)  Text page via [employer] Paging/Directory       Hospitalist  "Service    Clinically Significant Risk Factors Present on Admission                          ______________________________________________________________________    Chief Complaint   \"Seen better days\"    History is obtained from the patient and patient's chart    History of Present Illness   Tate Crocker is a 37 year old male with a history of alcohol use disorder, who was admitted to [facility] on 8/11/2022 for alcohol detox. Medicine was consulted for medical co-management.    Patient reports he has had better days. Feeling a little woozy in the head and has an upset stomach. Otherwise denies nausea, vomiting, shortness of breath, chest pain, diarrhea, constipation, dark/tarry stools, rashes, lesions, fevers, chills. Discussed liver function tests. Patient overall reports concern and wondering if there is anything he can do or take to help his liver, besides stopping drinking. Noted that stopping drinking is really the only thing to help at this time.     Review of Systems   The 10 point Review of Systems is negative other than noted in the HPI or here.     Past Medical History    I have reviewed this patient's medical history and updated it with pertinent information if needed.   History reviewed. No pertinent past medical history.    Past Surgical History   I have reviewed this patient's surgical history and updated it with pertinent information if needed.  History reviewed. No pertinent surgical history.    Social History   I have reviewed this patient's social history and updated it with pertinent information if needed.  Social History     Tobacco Use     Smoking status: Current Every Day Smoker     Types: Vaping Device     Smokeless tobacco: Never Used   Substance Use Topics     Alcohol use: Yes     Comment: Pt drinks daily .50 -1 L     Drug use: No       Family History   No significant or relevant family history.    Medications   I have reviewed this patient's current medications    Allergies "   Allergies   Allergen Reactions     Metal [Staples]      Rash        Physical Exam   Vital Signs: Temp: 97.7  F (36.5  C) Temp src: Temporal BP: 119/82 Pulse: 100   Resp: 16 SpO2: 95 % O2 Device: None (Room air)    Weight: 178 lbs 0 oz  General Appearance: Comfortable, nontoxic appearing male seen laying in bed.  Eyes: PERRLA.  No conjunctival icterus.  HEENT: Atraumatic.  Respiratory: Breathing comfortably on room air.  Lung sounds clear to auscultation throughout.  No wheezes, rhonchi, rales.  Cardiovascular: RRR.  No murmurs, rubs, gallops.  GI: Bowel sounds present throughout.  Abdomen tender throughout. No obvious distension.  Lymph/Hematologic: No bruising on exposed skin.  Skin: No jaundice, lesions or rashes noted on exposed skin.  Musculoskeletal: Moving all extremities spontaneously.  Neurologic: Cranial nerves II through XII grossly intact.  Psychiatric: Mood appropriate.      Data   Recent Labs   Lab 08/11/22  2326   PROTTOTAL 8.0   ALBUMIN 3.8   BILITOTAL 0.6   ALKPHOS 114   *   *     No lab results found in last 7 days.  No lab results found in last 7 days.  No lab results found in last 7 days.   Recent Labs   Lab 08/11/22  2326   TSH 2.73     No lab results found in last 7 days.  No results for input(s): GLC in the last 168 hours.     All labs personally reviewed in [place].  See A&P for additional results.     Unresulted Labs Ordered in the Past 30 Days of this Admission     No orders found for last 31 day(s).

## 2022-08-12 NOTE — PLAN OF CARE
"3AW Admission Note    S = Situation:     Pt is a 37 yr old who is here for Alcohol Detox, arrived to the unit at 0530 via ED  w/c transport    B  = Background:     Tate Crocker is a 37 year old year old male with a chief complaint of Addiction Problem (Pt seeking detox from alcohol, last drink 10 oclock this evening, Pt drinks about 750 mL to a L of hard liquor daily, for two years.  Pt has not had any w/d seizures.  Pt blew .254 breathalyzer at triage.  Pt went to [facility] here seeking detox.) pt does not use other substances, he had a period of sobriety for a year before replaces, states he has not been eating solids, just drinking, states he started off by drinking socially before switching to daily drinking and E smoking [id number]. 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    Pt is voluntary. Regular diet.    During admission interview, pt affect was flat anxious and depressed, no SI/HI , VSS. Contracts for safety.   MSSA 10. Valium 10mg given.  BP (!) 137/97 (BP Location: Right arm, Patient Position: Sitting)   Pulse 90   Temp 97.6  F (36.4  C) (Oral)   Resp 18   Ht 1.803 m (5' 11\")   Wt 80.7 kg (178 lb)   SpO2 98%   BMI 24.83 kg/m       R =   Request or Recommendation:    withdrawal will be monitored and treated using MSSA with valium  Pt will meet with psychiatry, internal medicine, and case management today  At the time of admission, pt  Would like out patient support for successful alcohol cessation.                      "

## 2022-08-12 NOTE — H&P
CHEMICAL DEPENDENCY/ PSYCHIATRY HISTORY AND PHYSICAL         DATE OF SERVICE:     8/12/2022         CHIEF COMPLAINT:     Pt wants detox and CD tx , intoxicated upon admission           HISTORY OF PRESENT ILLNESS:     This is a 37 year old   male with severe alcohol dependency in the last 3 years.  He says his boss used to offer alcohol when they were discussing business.   He says he has been drinking 1 liter of whiskey and votka per day. He has bed withdrawal symptoms, no seizures. He drinks to self medicate his withdrawal. He says he feels like crawling out of his skin , in order to get out of withdrawal. He says last week his friend invited him to go to a party and he thinks he drank so much that his level most likely was 0.400. He then went to another party. He says he has not been able to eat anything in 3 days. He can not sleep. Energy and concentration are decreased He denies suicidal and homicidal ideas, delusions and hallucinations. He says alcohol causes depression and anxiety. He says if he stays sober for several weeks , he has no depression or anxiety. He says alcohol affects his marriage and his job.   He says again that withdrawals are so severe and he drinks to treat withdrawal. He had 2 times sobriety for 1 month and then relapsed again. He says he thought he could successfully stop drinking , but he now understands that he needs help. Liver enzymes are elevated and medicine consultant will help co manage his alcohol induced complications.           CHEMICAL DEPENDENCY HISTORY:     History   Drug Use No   pt denies     Social History    Substance and Sexual Activity      Alcohol use: Yes        Comment: Pt drinks daily .50 -1 L    Alcohol, 1 liter of whiskey and votka per day     History   Smoking Status     Current Every Day Smoker     Types: Vaping Device   Smokeless Tobacco     Never Used     Chemical dependency treatments:no txs, he went to detox x2   DUI:x 2   Withdrawal seizures:pt denies           PAST PSYCHIATRIC HISTORY:     Hospitalizations: pt denies   ECT: pt denies   Suicide Attempts:pt denies   Gun Access: yes, secured  Community Supports: friends, family         PAST MEDICAL HISTORY:   History reviewed. No pertinent past medical history.    Elevated liver enzymes due to alcohol    Medications:     folic acid  1 mg Oral Daily     multivitamin w/minerals  1 tablet Oral Daily     nicotine  1 patch Transdermal Daily     nicotine   Transdermal Q8H     thiamine  100 mg Oral Daily     Medications as needed: acetaminophen, alum & mag hydroxide-simethicone, atenolol, diazepam, hydrOXYzine, loperamide, nicotine, ondansetron, senna-docusate, traZODone    ALLERGY: Metal [staples]    History of traumatic brain injury: pt denies   History of seizures:pt denies          MEDICATIONS:     No current outpatient medications on file.       Medication adherence: not on medications   Medication side effects: N/A  Benefit: MSSA to help with alcohol withdrawal         ROS:   Alcohol withdrawal symptoms,  otherwise reminder of review of systems is negative for:general,eyes, ears, nose, throat, neck, respiratory, cardiovascular, gastrointestinal, genitourinary,musculo-sceletal,neurological, hematological,skin and endocrine system.         FAMILY HISTORY:   History reviewed. No pertinent family history.   Psychiatric:pt denies   Suicide attempt:pt denies   Chemical Dependency:yes,alcoholism  Diabetes:pt denies   Dyslipidemia:pt denies          SOCIAL HISTORY:     Social History     Tobacco Use     Smoking status: Current Every Day Smoker     Types: Vaping Device     Smokeless tobacco: Never Used   Substance Use Topics     Alcohol use: Yes     Comment: Pt drinks daily .50 -1 L     Drug use: No       Patient was born in [facility],and raised in [place]  Parents .  Siblings:yes  Relationship with family:ok  Abuse:pt denies   Education:vo niko  Employment: self employed salesmen  Merital status:, she comes and goes because  "of his alcoholism  Children:no   Living situation:off and on with his wife   Legal problems:DWI  Financial problems:no   service:no  Strength:people person  Weakness: pt says he can not put things together   Hobby:pt does not identify         MENTAL STATUS EXAM:   General: fair hygiene, cooperative  Orientation:to self, place, time  Speech: normal in rate and tone  Language: intact  Thought processes: concrete  Thought content: denies delusions and hallucinations  Suicidal thoughts: denies   Homicidal thoughts: denies    Associations: connected  Affect: Anxious  Mood: depressed  Intellectual functioning: average  Fund of knowledge: consistent with education and experience   Attention and concentration: decreased  Memory: intact  Gait: steady  Psycho-motor activity: mild  agitation  Muscle tone:no atrophy, no involuntary movement  Insight and judgment: fair in terms of that he wants help         PHYSICAL EXAM:   Vitals: /82   Pulse 100   Temp 97.7  F (36.5  C) (Temporal)   Resp 16   Ht 1.803 m (5' 11\")   Wt 80.7 kg (178 lb)   SpO2 95%   BMI 24.83 kg/m      General:patient is not in acute distress  HEENT: head is normocephalic/atraumatic,  Pupils equal ,round, extraocular movements intact  Nasal passages transient  Oropharynx clear  Neck: Supple  Lungs: Clear to auscultation bilaterally  Heart: Regular rate and rhythm, no murmurs  Abdomen: Soft, nontender, normoactive bowel sounds, no hepatosplenomegaly  Back: No costovertebral tenderness  Extremities: No cyanosis, edema, clubbing  Skin: Normal color, warm, dry, no rash  Neurological exam: Nonfocal, CN II to XII grossly intact, Strength 5/5 x 4, sensory grossly intact to light touch, coordination grossly intact         LABS:     personally reviewed     Lab Results   Component Value Date    WBC 4.8 09/23/2021    HGB 14.0 09/23/2021    HCT 41.0 09/23/2021     09/23/2021    CHOL 251 (H) 08/11/2022    TRIG 137 08/11/2022     08/11/2022 "     (H) 08/11/2022     (H) 08/11/2022     09/23/2021    BUN 6 (L) 09/23/2021    CO2 27 09/23/2021    TSH 2.73 08/11/2022    INR 0.91 09/23/2021      Latest Reference Range & Units 08/11/22 22:51 08/11/22 23:26 08/11/22 23:28 08/11/22 23:38   Albumin 3.4 - 5.0 g/dL  3.8     Protein Total 6.8 - 8.8 g/dL  8.0     Alkaline Phosphatase 40 - 150 U/L  114     ALT 0 - 70 U/L  174 (H)     AST 0 - 45 U/L  242 (H)     Bilirubin Direct 0.0 - 0.2 mg/dL  0.2     Bilirubin Total 0.2 - 1.3 mg/dL  0.6     Cholesterol <200 mg/dL  251 (H)     GGT 0 - 75 U/L  1,172 (H)     HDL Cholesterol >=40 mg/dL  111     LDL Cholesterol Calculated <=100 mg/dL  113 (H)     Non HDL Cholesterol <130 mg/dL  140 (H)     Triglycerides <150 mg/dL  137     TSH 0.40 - 4.00 mU/L  2.73     SARS CoV2 PCR Negative    Negative    Alcohol Breath Test 0.00 - 0.01  0.254 !      Amphetamine Qual Urine Screen Negative     Screen Negative   Cocaine Qual Urine Screen Negative     Screen Negative   Benzodiazepine Qual Ur Screen Negative     Screen Negative   Opiates Qualitative Urine Screen Negative     Screen Negative   Cannabinoids Qual Urine Screen Negative     Screen Negative   Barbiturates Qual Urine Screen Negative     Screen Negative           DIAGNOSIS:     Alcohol use disorder severe dependency  Alcohol intoxication   Alcohol withdrawal syndrome with complications    Nicotine use disorder   Alcohol induced mood disorder  Alcohol induced anxiety disorder     Principal Problem:   Alcohol use disorder severe dependency     Active Problem List:    Patient Active Problem List   Diagnosis     Alcohol abuse     Alcohol dependence with uncomplicated intoxication (H)           ASSESSMENT  AND TREATMENT  RECOMMENDATIONS:     Patient was admitted to chemical dependency/psychiatric unit for alcohol detox. He has elevated liver enzymes. He is interested in detox and treatment. Depression and anxiety caused by alcohol and get resolved after he quit  drinking. He is not interested in antidepressant. He has developed  tolerance and dependence to alcohol in the last 3 years. We discussed diagnosis and treatment and these are the recommendations for treatment:    Medications:  Start [facility] protocol for alcohol withdrawal   Nicotine patch 21 mg daily for noicotine withdrawal  Nicotine gum 2 mg q 1 hour  for nicotine withdrawal   Trazodone  mg at bedtime prn sleep  Hydroxizine 25 mg q 4 hours prn anxiety  We discussed side effects, benefits and alternative treatment and patient agrees    15 minutes checks   Withdrawal precautions    will obtain collateral information and  make outpatient referrals   Rule 25 for chemical dependency treatment  Patient will attend groups.  Staff will  provide emotional support.  Labs reviewed personally:  Consults: medical consult to help manage alcohol withdrawal.    The patient was seen, medical record reviewed, care coordinated with the team.    This note was created with the help of Dragon  dictation system.  All typing and grammatical errors or context distortion are unintentional and inherent to software.    Yamilex Song MD    Initial Certification I certify that the inpatient psychiatric facility admission was medically necessary for treatment which could reasonably be expected to improve the patient s condition.       I estimate TBD  days of hospitalization is necessary for proper treatment of the patient. My plans for post-hospital care this patient are sobriety, medicatons,appointments.     Yamilex Song MD

## 2022-08-12 NOTE — ED NOTES
ED to Behavioral Floor Handoff    SITUATION  Tate Crocker is a 37 year old male who speaks English and lives in a home alone The patient arrived in the ED by private car from emergency room with a complaint of Addiction Problem (Pt seeking detox from alcohol, last drink PTA.  Pt drinks about 750 mL to a L of hard liquor daily, for two years.  Pt has not had any w/d seizures.  Pt blew .254 breathalyzer at triage.  Pt went to [facility] here seeking detox.)  .The patient's current symptoms started/worsened 2 week(s) ago and during this time the symptoms have increased.   In the ED, pt was diagnosed with   Final diagnoses:   Alcohol dependence with uncomplicated intoxication (H)        Initial vitals were: BP: (!) 140/101  Pulse: 81  Temp: 97.8  F (36.6  C)  Resp: 18  Height: 182.9 cm (6')  Weight: 81.5 kg (179 lb 11.2 oz)  SpO2: 97 %   --------  Is the patient diabetic? No   If yes, last blood glucose? --     If yes, was this treated in the ED? --  --------  Is the patient inebriated (ETOH) No or Impaired on other substances? No  MSSA done? Yes  Last MSSA score: --    Were withdrawal symptoms treated? Yes  Does the patient have a seizure history? No. If yes, date of most recent seizure--  --------  Is the patient patient experiencing suicidal ideation? denies current or recent suicidal ideation     Homicidal ideation? denies current or recent homicidal ideation or behaviors.    Self-injurious behavior/urges? denies current or recent self injurious behavior or ideation.  ------  Was pt aggressive in the ED No  Was a code called No  Is the pt now cooperative? Yes  -------  Meds given in ED:   Medications   diazepam (VALIUM) tablet 5-20 mg (10 mg Oral Given 8/12/22 1646)   atenolol (TENORMIN) tablet 50 mg (has no administration in time range)   thiamine (B-1) tablet 100 mg (has no administration in time range)   folic acid (FOLVITE) tablet 1 mg (has no administration in time range)   multivitamin w/minerals  (THERA-VIT-M) tablet 1 tablet (has no administration in time range)      Family present during ED course? No  Family currently present? No    BACKGROUND  Does the patient have a cognitive impairment or developmental disability? No  Allergies:   Allergies   Allergen Reactions     Metal [Staples]      Rash    .   Social demographics are   Social History     Socioeconomic History     Marital status: Single     Spouse name: None     Number of children: None     Years of education: None     Highest education level: None   Tobacco Use     Smoking status: Current Every Day Smoker     Types: Vaping Device     Smokeless tobacco: Never Used   Substance and Sexual Activity     Alcohol use: Yes     Comment: Pt drinks daily .50 -1 L     Drug use: No     Sexual activity: Yes        ASSESSMENT  Labs results   Labs Ordered and Resulted from Time of ED Arrival to Time of ED Departure   HEPATIC FUNCTION PANEL - Abnormal       Result Value    Bilirubin Total 0.6      Bilirubin Direct 0.2      Protein Total 8.0      Albumin 3.8      Alkaline Phosphatase 114       (*)      (*)    ALCOHOL BREATH TEST POCT - Abnormal    Alcohol Breath Test 0.254 (*)    COVID-19 VIRUS (CORONAVIRUS) BY PCR - Normal    SARS CoV2 PCR Negative     DRUG ABUSE SCREEN 1 URINE (ED) - Normal    Amphetamines Urine Screen Negative      Barbiturates Urine Screen Negative      Benzodiazepines Urine Screen Negative      Cannabinoids Urine Screen Negative      Cocaine Urine Screen Negative      Opiates Urine Screen Negative        Imaging Studies: No results found for this or any previous visit (from the past 24 hour(s)).   Most recent vital signs BP (!) 131/95   Pulse 74   Temp 98  F (36.7  C) (Oral)   Resp 18   Ht 1.829 m (6')   Wt 81.5 kg (179 lb 11.2 oz)   SpO2 100%   BMI 24.37 kg/m     Abnormal labs/tests/findings requiring intervention:---   Pain control: pt had none  Nausea control: pt had none    RECOMMENDATION  Are any infection precautions  needed (MRSA, VRE, etc.)? No If yes, what infection? --  ---  Does the patient have mobility issues? independently. If yes, what device does the pt use? ---  ---  Is patient on 72 hour hold or commitment? No If on 72 hour hold, have hold and rights been given to patient? N/A  Are admitting orders written if after 10 p.m. ?No  Tasks needing to be completed:---     Jeanne Hansen RN   [facility]--    [phone] [place] [facility]   [phone] [facility]

## 2022-08-12 NOTE — PROGRESS NOTES
Case Management:     Met with patient to introduce role of case management and to initiate discharge planning.  Patient stated they are interested in outpatient treatment services.  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  Patient also discussed once season has ended that patient would like to enter into [facility] for residential programming.   discussed levels of care and working on assessment paperwork would be the next step for patient.  Patient stated they do not have active health insurance.  Case management provided patient with application for insurance through [email or web address].  Case management did discuss that without health insurance - patient would be responsible for treatment costs and could look into low cost and sliding scale fee programming.  Patient acknowledged and will work on assessment and insurance paperwork.

## 2022-08-13 PROCEDURE — 128N000004 HC R&B CD ADULT

## 2022-08-13 PROCEDURE — 250N000013 HC RX MED GY IP 250 OP 250 PS 637: Performed by: PSYCHIATRY & NEUROLOGY

## 2022-08-13 RX ADMIN — NICOTINE POLACRILEX 2 MG: 2 GUM, CHEWING BUCCAL at 21:16

## 2022-08-13 RX ADMIN — TRAZODONE HYDROCHLORIDE 50 MG: 50 TABLET ORAL at 21:31

## 2022-08-13 RX ADMIN — FOLIC ACID 1 MG: 1 TABLET ORAL at 08:42

## 2022-08-13 RX ADMIN — DIAZEPAM 5 MG: 5 TABLET ORAL at 00:29

## 2022-08-13 RX ADMIN — NICOTINE 1 PATCH: 21 PATCH, EXTENDED RELEASE TRANSDERMAL at 08:42

## 2022-08-13 RX ADMIN — NICOTINE POLACRILEX 2 MG: 2 GUM, CHEWING BUCCAL at 18:58

## 2022-08-13 RX ADMIN — NICOTINE POLACRILEX 2 MG: 2 GUM, CHEWING BUCCAL at 08:45

## 2022-08-13 RX ADMIN — DIAZEPAM 10 MG: 5 TABLET ORAL at 16:20

## 2022-08-13 RX ADMIN — MULTIPLE VITAMINS W/ MINERALS TAB 1 TABLET: TAB at 08:42

## 2022-08-13 RX ADMIN — THIAMINE HCL TAB 100 MG 100 MG: 100 TAB at 08:42

## 2022-08-13 ASSESSMENT — ACTIVITIES OF DAILY LIVING (ADL)
ORAL_HYGIENE: INDEPENDENT
ADLS_ACUITY_SCORE: 40
ORAL_HYGIENE: INDEPENDENT
ADLS_ACUITY_SCORE: 40
DRESS: INDEPENDENT
ADLS_ACUITY_SCORE: 40
DRESS: INDEPENDENT
HYGIENE/GROOMING: INDEPENDENT
ADLS_ACUITY_SCORE: 40
HYGIENE/GROOMING: INDEPENDENT
ADLS_ACUITY_SCORE: 40
LAUNDRY: WITH SUPERVISION
ADLS_ACUITY_SCORE: 40

## 2022-08-13 NOTE — PROGRESS NOTES
Case Management:     Writer checked-in and introduced role to pt's care and how to assist pt during their stay. Inquired with pt about what they are needing during their stay and what they are considering for their aftercare plans.   Pt reports that he is wanting outpatient treatment, pt reports that he currently doesn't have any insurance and is working on the insurance paper work. Inquired if pt has completed the assessment paper work. Pt denies that he has and reports that he will work on it tonight.

## 2022-08-13 NOTE — PROGRESS NOTES
"CLINICAL NUTRITION SERVICES - ASSESSMENT NOTE     Nutrition Prescription    RECOMMENDATIONS FOR MDs/PROVIDERS TO ORDER:  None at this time     Malnutrition Status:    Unable to determine     Recommendations already ordered by Registered Dietitian (RD):  Ensure TID     Future/Additional Recommendations:  Monitor adequacy of PO intake. If documentation indicates that pt is consuming <50% nutritionally adequate meals TID, recommend:    Provide additional nutrition education on strategies to increase PO intake    Adjust supplement schedule per pt preference        REASON FOR ASSESSMENT  Tate Crocker is a/an 37 year old male assessed by the dietitian for Admission Nutrition Risk Screen for weight loss and decreased appetite     NUTRITION HISTORY  - 37 year old male with a history of alcohol use disorder, who was admitted to [facility] on 8/11/2022 for alcohol detox.  - Not eating solid food, just drinking  - Chart reviewed, unable to reach pt at this time       CURRENT NUTRITION ORDERS  Diet: Regular  Intake/Tolerance: No intakes documented yet     LABS  Labs reviewed    MEDICATIONS  Folic Acid, Multivitamin w/minerals, Thiamine others reviewed     ANTHROPOMETRICS  Height: 180.3 cm (5' 11\")  Most Recent Weight: 80.7 kg (178 lb)    IBW: 78.2 kg  BMI: Normal BMI  Weight History:   Wt Readings from Last 10 Encounters:   08/12/22 80.7 kg (178 lb)   09/24/21 77.1 kg (170 lb)       Dosing Weight: 81 kg CBW    ASSESSED NUTRITION NEEDS  Estimated Energy Needs: 0021-9698 kcals/day (25 - 30 kcals/kg)  Justification: Maintenance  Estimated Protein Needs: 81-97 grams protein/day (1 - 1.2 grams of pro/kg)  Justification: Maintenance  Estimated Fluid Needs:   Justification: Per provider pending fluid status    PHYSICAL FINDINGS  Unable to determine due to RD off-site and unable to conduct NFPA    MALNUTRITION  % Intake: Unable to assess  % Weight Loss: Unable to assess  Subcutaneous Fat Loss: Unable to assess  Muscle Loss: Unable " to assess  Fluid Accumulation/Edema: Unable to assess  Malnutrition Diagnosis: Unable to determine due to lack of information     NUTRITION DIAGNOSIS  Predicted inadequate nutrient intake Carb/Pro related to ETOH as evidenced by report of not eating solid food while consuming alcohol       INTERVENTIONS  Implementation  Nutrition Education: Unable to complete due to inability to reach pt at this time    Medical food supplement therapy: Ensure TID   Continue Regular diet as ordered     Goals  Patient to consume % of nutritionally adequate meal trays TID, or the equivalent with supplements/snacks.     Monitoring/Evaluation  Progress toward goals will be monitored and evaluated per protocol.    Carmelita Guerrero RD, LD   Clinical Dietitian   On Call Weekend Pager: [phone]

## 2022-08-13 NOTE — PLAN OF CARE
Goal Outcome Evaluation:       Problem: Alcohol Withdrawal  Goal: Alcohol Withdrawal Symptom Control  Outcome: Ongoing, Progressing      Patient appeared to be asleep for 6.75 hours during safety checks this shift. MSSA scores were 8 and 4. Patient received prn Valium  5 mg x .1. Will continue to monitor and update if there are changes.

## 2022-08-13 NOTE — PLAN OF CARE
Problem: Alcohol Withdrawal  Goal: Alcohol Withdrawal Symptom Control  8/12/2022 2038 by Molly Bridges, RN  Outcome: Ongoing, Progressing  8/12/2022 1654 by Molly Bridges, RN  Outcome: Met  8/12/2022 1653 by Molly Bridges, RN  Outcome: Met     Problem: Behavioral Health Plan of Care  Goal: Optimal Comfort and Wellbeing  Intervention: Provide Person-Centered Care  Recent Flowsheet Documentation  Taken 8/12/2022 1628 by Molly Bridges, RN  Trust Relationship/Rapport: care explained   Goal Outcome Evaluation:        Pt visible in the milieu. Calm, pleasant with flat affect. Endorsed high anxiety and depression. Prn Atarax given x1 with good effect. Denies HI/SI/SIB. MSSA scores of 8 and 7. PRN 10mg of valium given x1.

## 2022-08-13 NOTE — PLAN OF CARE
"Goal Outcome Evaluation:    Plan of Care Reviewed With: patient     Overall Patient Progress: improving    Pt continues to be monitored for alcohol withdrawal. MSSA = 6 and 3 this shift.  Pt symptoms are improving since yesterday. He would like to complete the detox by tomorrow so he may be discharged to home. He is visible in the lounge. He is pleasant and cooperative. He denies SI/SIB/HI.    Discharge plan is still being formulated.    /80   Pulse 69   Temp 98  F (36.7  C) (Oral)   Resp 16   Ht 1.803 m (5' 11\")   Wt 80.7 kg (178 lb)   SpO2 98%   BMI 24.83 kg/m          "

## 2022-08-14 VITALS
SYSTOLIC BLOOD PRESSURE: 122 MMHG | BODY MASS INDEX: 24.92 KG/M2 | HEART RATE: 76 BPM | DIASTOLIC BLOOD PRESSURE: 87 MMHG | WEIGHT: 178 LBS | TEMPERATURE: 97.5 F | OXYGEN SATURATION: 99 % | RESPIRATION RATE: 16 BRPM | HEIGHT: 71 IN

## 2022-08-14 PROCEDURE — 99239 HOSP IP/OBS DSCHRG MGMT >30: CPT | Performed by: PSYCHIATRY & NEUROLOGY

## 2022-08-14 PROCEDURE — 250N000013 HC RX MED GY IP 250 OP 250 PS 637: Performed by: PSYCHIATRY & NEUROLOGY

## 2022-08-14 RX ADMIN — THIAMINE HCL TAB 100 MG 100 MG: 100 TAB at 08:33

## 2022-08-14 RX ADMIN — TRAZODONE HYDROCHLORIDE 50 MG: 50 TABLET ORAL at 00:04

## 2022-08-14 RX ADMIN — NICOTINE 1 PATCH: 21 PATCH, EXTENDED RELEASE TRANSDERMAL at 12:57

## 2022-08-14 RX ADMIN — MULTIPLE VITAMINS W/ MINERALS TAB 1 TABLET: TAB at 08:33

## 2022-08-14 RX ADMIN — FOLIC ACID 1 MG: 1 TABLET ORAL at 08:33

## 2022-08-14 RX ADMIN — ACETAMINOPHEN 650 MG: 325 TABLET, FILM COATED ORAL at 07:46

## 2022-08-14 RX ADMIN — NICOTINE POLACRILEX 2 MG: 2 GUM, CHEWING BUCCAL at 00:04

## 2022-08-14 RX ADMIN — NICOTINE POLACRILEX 2 MG: 2 GUM, CHEWING BUCCAL at 12:57

## 2022-08-14 ASSESSMENT — ACTIVITIES OF DAILY LIVING (ADL)
HYGIENE/GROOMING: INDEPENDENT
ADLS_ACUITY_SCORE: 40
ORAL_HYGIENE: INDEPENDENT
ADLS_ACUITY_SCORE: 40
DRESS: INDEPENDENT
ADLS_ACUITY_SCORE: 40

## 2022-08-14 NOTE — PROGRESS NOTES
Patient alert and oriented x4.  Calm and pleasant and co operative. Denies anxiety and depression and SI/HI/SIB. Patient contracted for safety. MSSA of 2. No prn valium given. Patient out of detox and was discharged  by MD to home. Discharged teaching and education completed and patient verbalized understanding.Patient's  AVS and Lab results printed and was given to patient during discharge and all patient's  belongings was given back to him with no concerns. Staff see patient off the unit to pick his ride home.

## 2022-08-14 NOTE — DISCHARGE SUMMARY
[facility]    Discharge Summary  Adult Psychiatry    Date of Admission:  8/11/2022  Date of Discharge:  8/14/2022  Discharging Provider: Clifford Calloway MD    Discharge Diagnoses   Principal Discharge Diagnosis:   Alcohol use disorder severe dependency  Alcohol intoxication   Alcohol withdrawal syndrome with complications    Nicotine use disorder   Alcohol induced mood disorder  Alcohol induced anxiety disorder     History of Present Illness   This is a 37 year old   male with severe alcohol dependency in the last 3 years.  He says his boss used to offer alcohol when they were discussing business.   He says he has been drinking 1 liter of whiskey and votka per day. He has bed withdrawal symptoms, no seizures. He drinks to self medicate his withdrawal. He says he feels like crawling out of his skin , in order to get out of withdrawal. He says last week his friend invited him to go to a party and he thinks he drank so much that his level most likely was 0.400. He then went to another party. He says he has not been able to eat anything in 3 days. He can not sleep. Energy and concentration are decreased He denies suicidal and homicidal ideas, delusions and hallucinations. He says alcohol causes depression and anxiety. He says if he stays sober for several weeks , he has no depression or anxiety. He says alcohol affects his marriage and his job.   He says again that withdrawals are so severe and he drinks to treat withdrawal. He had 2 times sobriety for 1 month and then relapsed again. He says he thought he could successfully stop drinking , but he now understands that he needs help. Liver enzymes are elevated and medicine consultant will help co manage his alcohol induced complications.       Hospital Course   Patient was admitted to chemical dependency/psychiatric unit for alcohol detox. He had elevated liver enzymes. He is interested in detox and treatment.  Depression and anxiety caused by alcohol and get resolved after he quit drinking. He is not interested in antidepressant. He has developed  tolerance and dependence to alcohol in the last 3 years. We discussed diagnosis and treatment and these are the recommendations for treatment:  Pt completed detox and requested to discharge.      Clifford Calloway MD    Significant Results and Procedures    Amphetamines Urine   Date Value Ref Range Status   08/11/2022 Screen Negative Screen Negative Final     Comment:     Cutoff for a negative amphetamine is 500 ng/mL or less.     Barbiturates Urine   Date Value Ref Range Status   08/11/2022 Screen Negative Screen Negative Final     Comment:     Cutoff for a negative barbiturate is 200 ng/mL or less.     Cannabinoids Urine   Date Value Ref Range Status   08/11/2022 Screen Negative Screen Negative Final     Comment:     Cutoff for a negative cannabinoid is 50 ng/mL or less.     Cocaine Urine   Date Value Ref Range Status   08/11/2022 Screen Negative Screen Negative Final     Comment:     Cutoff for a negative cocaine is 300 ng/mL or less.     Opiates Urine   Date Value Ref Range Status   08/11/2022 Screen Negative Screen Negative Final     Comment:     Cutoff for a negative opiate is 300 ng/mL or less.       Unresulted Labs Ordered in the Past 30 Days of this Admission     No orders found from 7/12/2022 to 8/12/2022.          Code Status   Full Code    Primary Care Physician   Physician No Ref-Primary    Physical Exam   Appearance:  awake, alert  Attitude:  cooperative  Eye Contact:  good  Mood:  good  Affect:  appropriate and in normal range  Speech:  clear, coherent  Psychomotor Behavior:  no evidence of tardive dyskinesia, dystonia, or tics  Thought Process:  logical, linear and goal oriented  Associations:  no loose associations  Thought Content:  no evidence of suicidal ideation or homicidal ideation  Insight:  good  Judgment:  intact  Oriented to:  time, person,  and place  Attention Span and Concentration:  intact  Recent and Remote Memory:  intact  Language: Able to name objects, Able to repeat phrases and Able to read and write  Fund of Knowledge: appropriate  Muscle Strength and Tone: normal  Gait and Station: Normal    Time Spent on this Encounter   I, Clifford Calloway MD, personally saw the patient today and spent greater than 30 minutes discharging this patient.    Discharge Disposition   Discharged to home  Condition at discharge: Stable    Consultations This Hospital Stay   INTERNAL MEDICINE ADULT IP CONSULT FOR BEH DETOX ON 3A    Discharge Orders   No discharge procedures on file.  Discharge Medications   There are no discharge medications for this patient.    Allergies   Allergies   Allergen Reactions     Metal [Staples]      Rash      Data   Most Recent 3 CBC's:Recent Labs   Lab Test 09/23/21 1816 02/20/18  1719   WBC 4.8 8.3   HGB 14.0 12.5*   MCV 96 89    222      Most Recent 3 BMP's:  Recent Labs   Lab Test 09/23/21 1816 02/20/18  1719    134   POTASSIUM 4.0 3.8   CHLORIDE 108 100   CO2 27 28   BUN 6* 11   CR 0.88 0.95   ANIONGAP 7 6   SUSANNAH 8.6 9.1   * 102*     Most Recent 2 LFT's:  Recent Labs   Lab Test 08/11/22  2326 09/26/21  1119   * 523*   * 396*   ALKPHOS 114 114   BILITOTAL 0.6 0.7     Most Recent INR's and Anticoagulation Dosing History:  Anticoagulation Dose History     Recent Dosing and Labs Latest Ref Rng & Units 9/23/2021    INR 0.86 - 1.14 0.91        Most Recent 3 Troponin's:No lab results found.  Most Recent Cholesterol Panel:  Recent Labs   Lab Test 08/11/22  2326   CHOL 251*   *      TRIG 137     Most Recent 6 Bacteria Isolates From Any Culture (See EPIC Reports for Culture Details):  Recent Labs   Lab Test 02/20/18  0514   CULT Heavy growth  Staphylococcus aureus  *  Moderate growth  Acinetobacter species, not baumannii  *  Moderate growth  Strain 2  Acinetobacter species, not  baumannii  *     Most Recent TSH, T4 and A1c Labs:  Recent Labs   Lab Test 08/11/22  2326   TSH 2.73

## 2022-08-14 NOTE — PLAN OF CARE
Goal Outcome Evaluation:    Plan of Care Reviewed With: patient     Overall Patient Progress: improving         Pt continues to be monitored for alcohol withdrawal. MSSA scores this shift of 9 and 7. Pt had many questions about labs and liver/kidney function. Pt's labs printed for him and all questions answered. Discussed patient's drinking and it's impact on his liver. Pt ate well. Active and social in milieu. Pt endorses anxiety.

## 2022-08-14 NOTE — DISCHARGE INSTRUCTIONS
Behavioral Discharge Planning and Instructions  THANK YOU FOR CHOOSING THE [facility]  3AW  [phone]    Summary: You were admitted to Station 3A 8/11/22 for detoxification from alcohol.  A medical exam was performed that included lab work. You are being discharged to home.  Please take care and make your recovery a priority!    Main Diagnosis: Alcohol use disorder    Recommendation:  If you need more support to maintain sobriety call [phone] to schedule a chemical assessment and follow the recommendations of that assessment.     Disposition: home    Primary Provider:  [facility] ([facility])  [address] [place] [address]  Phone: [phone]  Fax: [phone]  [email or web address]    Monday - Friday  7:15am - 5:00pm  Other appointment times are available - please call for more information  - Medication management  Please follow up with primary MD  within 30 days for post hospitalization checkup.  Recommend repeat of liver function test.    Major Treatments, Procedures and Findings:  You have withdrawn from alcohol using the MSSA protocol.   You have had labs drawn and those results have been reviewed with you. Please take a copy of your lab work with you to your next primary care physician appointment.    Symptoms to Report:  If you experience more anxiety, confusion, sleeplessness, deep sadness or thoughts of suicide, notify your treatment team or notify your primary care physician. IF ANY OF THE SYMPTOMS YOU ARE EXPERIENCING ARE A MEDICAL EMERGENCY CALL 911 IMMEDIATELY.     Lifestyle Adjustment: Adjust your lifestyle to get enough sleep, relaxation, exercise and  good nutrition. Continue to develop healthy coping skills to decrease stress and promote a sober living environment. Do not use alcohol, illegal drugs or addictive medications other than what is currently prescribed. AA, NA, and  Sponsor are excellent resources for support.     General  Medication Instructions:   See your medication sheet(s) for instructions.   Take all medicines as directed.  Make no changes unless your doctor suggests them.       DISCHARGE RESOURCES:    [facility]-Walk-In  Weekday Assessments - Monday thru Friday, 7:00am to 1:45pm  [address] [place] [address]  Weekend Assessments - Saturday s, 7:45am to 10:45am  [address], [place] [address]   Phone: [phone] Recommendation:  If you need more support to maintain sobriety call [phone] to schedule a chemical assessment and follow the recommendations of that assessment.        Facts about COVID19 at www.cdc.gov/COVID19 and www.[place].gov/covid19    Keeping hands clean is one of the most important steps we can take to avoid getting sick and spreading germs to others.  Please wash your hands frequently and lather with soap for at least 20 seconds!    Recovery apps for your phone to locate current in person and zoom recovery meetings  [place] - meeting jass  AA  - meeting jass  Meeting guide - meeting jass  Quick NA meeting - meeting jass  Moses- has various apps    Great Pod casts for nutrition and wellness  Listen on Apple Podcasts  Dishing Up Nutrition   [employer]   Nutrition       Understand the connection between what you eat and how you feel. Hosted by licensed nutritionists and dietitians from [employer] we share practical, real-life solutions for healthier living through nutrition.     Resources:   Resources for on line recovery meetings:  *due to covid-19 AA/NA meetings are being held online*  AA meetings can be found online; search for them at: http://aa-intergroup.org/directory.php  AA meetings via ZOOM for [place] area can be found online at: https://a[place].org/find-a-meeting/holiday-closings/  NA meetings via ZOOM for [place] area can be found online at:  "https://sites.[employer].com/view/[place]regionofnarcoticsanonymous/home?authuser=2  Www.VTM  has online resources for meeting and recovery care including Podcast \"Let's Talk:Addiction & Recovery Podcasts  Www.[employer].org   -SMART Recovery - self management for addiction recovery:  www.smartSetup.org    -Pathways ~ A Health Crisis Resource & Support Center: 411.765.9875.  -[facility] [phone]   -[facility] Behavioral Intake 033-896-2880 or 724-683-6934.  -Suicide Awareness Voices of Education (SAVE) (www.save.org): 465-740-GWRO (2286)  -National Suicide Prevention Line (www.mentalhealthmn.org): 296-125-BRRE (3189)  -National [place] on Mental Illness (www.[place].miguel.org): 284.776.6653 or 507-587-3813.  -Ocil2ntoe: text the word LIFE to 91951 for immediate support and crisis intervention  -Mental Health Consumer/Survivor Network of [place] (www.mhcsn.net): 862.416.2686 or 563-812-7354  -Mental Health Association of [place] (www.mentalhealth.org): 837.320.7920 or 449-005-2576     -Substance Abuse and Mental Health Services (www.samhsa.gov)  -Harm Reduction Coalition (www. Harmreduction.org)  -www.prescribetoprevent.org or http://prescribetoprevent.org/video  -Poison control 7-302-468-4505   **[place] Opioid Prevention Coalition: www.opioidcoalition.org    Sober Support Group Information:  AA/NA & Sponsor/Support  -Alcoholics Anonymous (www.alcoholics-anonymous.org): for local information 24 hours/day  -AA Intergroup service office in [place] (http://www.aastpaul.org/) 302.369.7340  -AA Intergroup service office in [facility]: 849.746.1430. (http://www.a[place].org/)  -Narcotics Anonymous (www.naminn[place].org) (606) 387-5915   **Sober Fun Activities: www.soberThermalTherapeuticSystemsactivities.[employer].All About Baby./[facility]//[place]    [place] Recovery Connection (MRC)  [facility] connects people seeking recovery to resources that help foster and sustain long-term recovery.  Whether you are seeking resources " for treatment, transportation, housing, job training, education, health care or other pathways to recovery, [facility] is a great place to start.    Phone: 721.157.9157.  www.[place].beenz.com (Great listing of all types of recovery and non-recovery related resources)    Any follow up concerns:  Nursing questions call the Unit [facility] [phone]  Medical Record call [phone]  Outpatient Behavioral Intake call [phone]  LP+ Wait List/Bed Availability call [phone]    The entire treatment team has appreciated the opportunity to work with you.  We wish you the best in the future and with your lifelong recovery goals. Please bring this discharge folder with you to all follow up appointments.  It contains your lab results, diagnosis, medication list and discharge recommendations.    THANK YOU FOR CHOOSING THE [facility]

## 2022-08-14 NOTE — PLAN OF CARE
Goal Outcome Evaluation:        Problem: Alcohol Withdrawal  Goal: Alcohol Withdrawal Symptom Control  Outcome: Ongoing, Progressing      Patient appeared to be asleep during safety checks this shift. MSSA scores was a 5. Patient did not receive prn Valium this shift. Will continue to monitor and update if there are changes.

## 2022-08-14 NOTE — PLAN OF CARE
08/13/22 1645   Group Therapy Session   Group Attendance attended group session   Time Session Began 1645   Time Session Ended 1725   Total Time patient participated (minutes) 35   Total # Attendees 9   Group Type Psychotherapy   Group Session Detail Group reviewed the concept of hope, in the context of neuroplasticity, and how new information from brain revitality can be applied as a motivation to follow treatment recommendations and engage in self-initiated health-seeking behaviors. Group members took time to discuss their understanding of health seeking behaviors, and ways in which successful behaviors help to build hope. Reviewed strategies for positive habit-building while focusing on specific symptoms.    Patient Response/Contribution Pt attended the group.Asked questions and provided feedback to group members   Patient Response Detail Pt presented as motivated. Listened attentively.

## 2022-08-14 NOTE — PLAN OF CARE
"Goal Outcome Evaluation:    Plan of Care Reviewed With: patient     Overall Patient Progress: improving    Pt continues to be monitored for alcohol withdrawal. MSSA = 2 and 1  this shift.  Pt is symptom free . He is visible in the lounge. He is pleasant and cooperative. He denies SI/SIB/HI.    He should complete the detox process at 1600 at which time he wishes to be discharged to home.    /70   Pulse 60   Temp 98  F (36.7  C) (Oral)   Resp 16   Ht 1.803 m (5' 11\")   Wt 80.7 kg (178 lb)   SpO2 99%   BMI 24.83 kg/m          "

## 2022-08-15 ENCOUNTER — PATIENT OUTREACH (OUTPATIENT)
Dept: CARE COORDINATION | Facility: CLINIC | Age: 37
End: 2022-08-15

## 2022-08-15 DIAGNOSIS — Z71.89 OTHER SPECIFIED COUNSELING: ICD-10-CM

## 2022-08-15 NOTE — PROGRESS NOTES
Clinic Care Coordination Contact  [facility]: Post-Discharge Note  SITUATION                                                      Admission:    Admission Date: 08/11/22   Reason for Admission: Alcohol dependence with uncomplicated intoxication (H)    Nicotine use disorder    Alcohol use disorder, severe, dependence (H)    Alcohol withdrawal syndrome with complication (H)    Alcohol-induced mood disorder (H)    Alcohol-induced anxiety disorder (H)  Discharge:   Discharge Date: 08/14/22  Discharge Diagnosis: Alcohol dependence with uncomplicated intoxication (H)    Nicotine use disorder    Alcohol use disorder, severe, dependence (H)    Alcohol withdrawal syndrome with complication (H)    Alcohol-induced mood disorder (H)    Alcohol-induced anxiety disorder (H)    BACKGROUND                                                      Per hospital discharge summary and inpatient provider notes:  Patient was admitted to chemical dependency/psychiatric unit for alcohol detox. He had elevated liver enzymes. He is interested in detox and treatment. Depression and anxiety caused by alcohol and get resolved after he quit drinking. He is not interested in antidepressant. He has developed  tolerance and dependence to alcohol in the last 3 years. We discussed diagnosis and treatment and these are the recommendations for treatment:  Pt completed detox and requested to discharge.     ASSESSMENT      Enrollment  Primary Care Care Coordination Status: Not a Candidate    Discharge Assessment  How are you doing now that you are home?: Pt reports he is improving. He had questions about the process of getting arule 25 and treatment options. Caller was able to answer all current questions. Pt plans to follow up with Emy Jesus.  How are your symptoms? (Red Flag symptoms escalate to triage hotline per guidelines): Improved  Do you feel your condition is stable enough to be safe at home until your provider visit?: Yes  Does the patient  have their discharge instructions? : Yes  Does the patient have questions regarding their discharge instructions? : No  Were you started on any new medications or were there changes to any of your previous medications? : No  Discharge follow-up appointment scheduled within 14 calendar days? : No (Pt will schedule as needed)              PLAN                                                      Outpatient Plan:  [facility]  [address] [place] [address]  Phone: [phone]  Fax: [phone]  [email or web address]  Monday - Friday  7:15am - 5:00pm  Other appointment times are available - please call for more information  - Medication management  Please follow up with primary MD within 30 days for post hospitalization checkup.  Recommend repeat of liver function test.    No future appointments.      For any urgent concerns, please contact our 24 hour nurse triage line: [phone] ([phone])         JUN Galvan  Connected Care Resource [facility]     *Connected Care Resource Team does NOT follow patient ongoing. Referrals are identified based on internal discharge reports and the outreach is to ensure patient has an understanding of their discharge instructions.

## 2022-12-14 ENCOUNTER — HOSPITAL ENCOUNTER (INPATIENT)
Facility: CLINIC | Age: 37
LOS: 2 days | Discharge: HOME | DRG: 897 | End: 2022-12-17
Attending: EMERGENCY MEDICINE | Admitting: PSYCHIATRY & NEUROLOGY

## 2022-12-14 DIAGNOSIS — Z20.822 CONTACT WITH AND (SUSPECTED) EXPOSURE TO COVID-19: ICD-10-CM

## 2022-12-14 DIAGNOSIS — F10.220 ALCOHOL DEPENDENCE WITH UNCOMPLICATED INTOXICATION (H): ICD-10-CM

## 2022-12-14 DIAGNOSIS — R74.01 TRANSAMINITIS: ICD-10-CM

## 2022-12-14 DIAGNOSIS — F17.200 NICOTINE USE DISORDER: ICD-10-CM

## 2022-12-14 DIAGNOSIS — F10.980 ALCOHOL-INDUCED ANXIETY DISORDER (H): Primary | ICD-10-CM

## 2022-12-14 LAB
ALCOHOL BREATH TEST: 0.26 (ref 0–0.01)
AMPHETAMINES UR QL SCN: NORMAL
BARBITURATES UR QL: NORMAL
BENZODIAZ UR QL: NORMAL
CANNABINOIDS UR QL SCN: NORMAL
COCAINE UR QL: NORMAL
OPIATES UR QL SCN: NORMAL

## 2022-12-14 PROCEDURE — 80307 DRUG TEST PRSMV CHEM ANLYZR: CPT | Performed by: EMERGENCY MEDICINE

## 2022-12-14 PROCEDURE — 99284 EMERGENCY DEPT VISIT MOD MDM: CPT | Performed by: EMERGENCY MEDICINE

## 2022-12-14 PROCEDURE — 250N000013 HC RX MED GY IP 250 OP 250 PS 637: Performed by: EMERGENCY MEDICINE

## 2022-12-14 PROCEDURE — HZ2ZZZZ DETOXIFICATION SERVICES FOR SUBSTANCE ABUSE TREATMENT: ICD-10-PCS | Performed by: EMERGENCY MEDICINE

## 2022-12-14 PROCEDURE — 36415 COLL VENOUS BLD VENIPUNCTURE: CPT | Performed by: EMERGENCY MEDICINE

## 2022-12-14 PROCEDURE — 82075 ASSAY OF BREATH ETHANOL: CPT | Performed by: EMERGENCY MEDICINE

## 2022-12-14 PROCEDURE — C9803 HOPD COVID-19 SPEC COLLECT: HCPCS | Performed by: EMERGENCY MEDICINE

## 2022-12-14 PROCEDURE — 83690 ASSAY OF LIPASE: CPT | Performed by: EMERGENCY MEDICINE

## 2022-12-14 PROCEDURE — U0005 INFEC AGEN DETEC AMPLI PROBE: HCPCS | Performed by: EMERGENCY MEDICINE

## 2022-12-14 PROCEDURE — 85025 COMPLETE CBC W/AUTO DIFF WBC: CPT | Performed by: EMERGENCY MEDICINE

## 2022-12-14 PROCEDURE — 80053 COMPREHEN METABOLIC PANEL: CPT | Performed by: EMERGENCY MEDICINE

## 2022-12-14 PROCEDURE — 99285 EMERGENCY DEPT VISIT HI MDM: CPT | Performed by: EMERGENCY MEDICINE

## 2022-12-14 RX ADMIN — NICOTINE POLACRILEX 2 MG: 2 GUM, CHEWING BUCCAL at 23:40

## 2022-12-15 LAB
ALBUMIN SERPL-MCNC: 3.7 G/DL (ref 3.4–5)
ALP SERPL-CCNC: 86 U/L (ref 40–150)
ALT SERPL W P-5'-P-CCNC: 114 U/L (ref 0–70)
ANION GAP SERPL CALCULATED.3IONS-SCNC: 5 MMOL/L (ref 3–14)
AST SERPL W P-5'-P-CCNC: 118 U/L (ref 0–45)
BASOPHILS # BLD AUTO: 0 10E3/UL (ref 0–0.2)
BASOPHILS NFR BLD AUTO: 1 %
BILIRUB SERPL-MCNC: 0.3 MG/DL (ref 0.2–1.3)
BUN SERPL-MCNC: 7 MG/DL (ref 7–30)
CALCIUM SERPL-MCNC: 8.8 MG/DL (ref 8.5–10.1)
CHLORIDE BLD-SCNC: 110 MMOL/L (ref 94–109)
CO2 SERPL-SCNC: 26 MMOL/L (ref 20–32)
CREAT SERPL-MCNC: 0.82 MG/DL (ref 0.66–1.25)
EOSINOPHIL # BLD AUTO: 0.1 10E3/UL (ref 0–0.7)
EOSINOPHIL NFR BLD AUTO: 3 %
ERYTHROCYTE [DISTWIDTH] IN BLOOD BY AUTOMATED COUNT: 13.3 % (ref 10–15)
GFR SERPL CREATININE-BSD FRML MDRD: >90 ML/MIN/1.73M2
GLUCOSE BLD-MCNC: 91 MG/DL (ref 70–99)
HCT VFR BLD AUTO: 41.6 % (ref 40–53)
HGB BLD-MCNC: 14.6 G/DL (ref 13.3–17.7)
IMM GRANULOCYTES # BLD: 0 10E3/UL
IMM GRANULOCYTES NFR BLD: 0 %
LIPASE SERPL-CCNC: 118 U/L (ref 73–393)
LYMPHOCYTES # BLD AUTO: 1.8 10E3/UL (ref 0.8–5.3)
LYMPHOCYTES NFR BLD AUTO: 36 %
MCH RBC QN AUTO: 33 PG (ref 26.5–33)
MCHC RBC AUTO-ENTMCNC: 35.1 G/DL (ref 31.5–36.5)
MCV RBC AUTO: 94 FL (ref 78–100)
MONOCYTES # BLD AUTO: 0.5 10E3/UL (ref 0–1.3)
MONOCYTES NFR BLD AUTO: 10 %
NEUTROPHILS # BLD AUTO: 2.5 10E3/UL (ref 1.6–8.3)
NEUTROPHILS NFR BLD AUTO: 50 %
NRBC # BLD AUTO: 0 10E3/UL
NRBC BLD AUTO-RTO: 0 /100
PLATELET # BLD AUTO: 206 10E3/UL (ref 150–450)
POTASSIUM BLD-SCNC: 4.1 MMOL/L (ref 3.4–5.3)
PROT SERPL-MCNC: 7.8 G/DL (ref 6.8–8.8)
RBC # BLD AUTO: 4.42 10E6/UL (ref 4.4–5.9)
SARS-COV-2 RNA RESP QL NAA+PROBE: NEGATIVE
SODIUM SERPL-SCNC: 141 MMOL/L (ref 133–144)
WBC # BLD AUTO: 4.9 10E3/UL (ref 4–11)

## 2022-12-15 PROCEDURE — 99223 1ST HOSP IP/OBS HIGH 75: CPT | Mod: AI | Performed by: PSYCHIATRY & NEUROLOGY

## 2022-12-15 PROCEDURE — 99221 1ST HOSP IP/OBS SF/LOW 40: CPT

## 2022-12-15 PROCEDURE — 128N000004 HC R&B CD ADULT

## 2022-12-15 PROCEDURE — 250N000013 HC RX MED GY IP 250 OP 250 PS 637: Performed by: EMERGENCY MEDICINE

## 2022-12-15 PROCEDURE — 250N000013 HC RX MED GY IP 250 OP 250 PS 637: Performed by: PSYCHIATRY & NEUROLOGY

## 2022-12-15 RX ORDER — ACETAMINOPHEN 325 MG/1
650 TABLET ORAL EVERY 4 HOURS PRN
Status: DISCONTINUED | OUTPATIENT
Start: 2022-12-15 | End: 2022-12-17 | Stop reason: HOSPADM

## 2022-12-15 RX ORDER — NICOTINE 21 MG/24HR
1 PATCH, TRANSDERMAL 24 HOURS TRANSDERMAL DAILY
Status: DISCONTINUED | OUTPATIENT
Start: 2022-12-15 | End: 2022-12-17 | Stop reason: HOSPADM

## 2022-12-15 RX ORDER — MAGNESIUM HYDROXIDE/ALUMINUM HYDROXICE/SIMETHICONE 120; 1200; 1200 MG/30ML; MG/30ML; MG/30ML
30 SUSPENSION ORAL EVERY 4 HOURS PRN
Status: DISCONTINUED | OUTPATIENT
Start: 2022-12-15 | End: 2022-12-17 | Stop reason: HOSPADM

## 2022-12-15 RX ORDER — TRAZODONE HYDROCHLORIDE 50 MG/1
50 TABLET, FILM COATED ORAL
Status: DISCONTINUED | OUTPATIENT
Start: 2022-12-15 | End: 2022-12-17 | Stop reason: HOSPADM

## 2022-12-15 RX ORDER — FOLIC ACID 1 MG/1
1 TABLET ORAL DAILY
Status: DISCONTINUED | OUTPATIENT
Start: 2022-12-15 | End: 2022-12-17 | Stop reason: HOSPADM

## 2022-12-15 RX ORDER — MULTIPLE VITAMINS W/ MINERALS TAB 9MG-400MCG
1 TAB ORAL DAILY
Status: DISCONTINUED | OUTPATIENT
Start: 2022-12-15 | End: 2022-12-15

## 2022-12-15 RX ORDER — ONDANSETRON 4 MG/1
4 TABLET, ORALLY DISINTEGRATING ORAL EVERY 6 HOURS PRN
Status: DISCONTINUED | OUTPATIENT
Start: 2022-12-15 | End: 2022-12-17 | Stop reason: HOSPADM

## 2022-12-15 RX ORDER — HYDROXYZINE HYDROCHLORIDE 25 MG/1
25 TABLET, FILM COATED ORAL EVERY 4 HOURS PRN
Status: DISCONTINUED | OUTPATIENT
Start: 2022-12-15 | End: 2022-12-17 | Stop reason: HOSPADM

## 2022-12-15 RX ORDER — AMOXICILLIN 250 MG
1 CAPSULE ORAL 2 TIMES DAILY PRN
Status: DISCONTINUED | OUTPATIENT
Start: 2022-12-15 | End: 2022-12-17 | Stop reason: HOSPADM

## 2022-12-15 RX ORDER — MULTIPLE VITAMINS W/ MINERALS TAB 9MG-400MCG
1 TAB ORAL DAILY
Status: DISCONTINUED | OUTPATIENT
Start: 2022-12-15 | End: 2022-12-17 | Stop reason: HOSPADM

## 2022-12-15 RX ORDER — LOPERAMIDE HCL 2 MG
2 CAPSULE ORAL 4 TIMES DAILY PRN
Status: DISCONTINUED | OUTPATIENT
Start: 2022-12-15 | End: 2022-12-17 | Stop reason: HOSPADM

## 2022-12-15 RX ORDER — DIAZEPAM 5 MG
5-20 TABLET ORAL EVERY 30 MIN PRN
Status: DISCONTINUED | OUTPATIENT
Start: 2022-12-15 | End: 2022-12-17 | Stop reason: HOSPADM

## 2022-12-15 RX ORDER — DIAZEPAM 5 MG
5-20 TABLET ORAL EVERY 30 MIN PRN
Status: DISCONTINUED | OUTPATIENT
Start: 2022-12-15 | End: 2022-12-15

## 2022-12-15 RX ORDER — ATENOLOL 50 MG/1
50 TABLET ORAL DAILY PRN
Status: DISCONTINUED | OUTPATIENT
Start: 2022-12-15 | End: 2022-12-17 | Stop reason: HOSPADM

## 2022-12-15 RX ORDER — FOLIC ACID 1 MG/1
1 TABLET ORAL DAILY
Status: DISCONTINUED | OUTPATIENT
Start: 2022-12-15 | End: 2022-12-15

## 2022-12-15 RX ADMIN — HYDROXYZINE HYDROCHLORIDE 25 MG: 25 TABLET ORAL at 11:34

## 2022-12-15 RX ADMIN — HYDROXYZINE HYDROCHLORIDE 25 MG: 25 TABLET ORAL at 20:26

## 2022-12-15 RX ADMIN — TRAZODONE HYDROCHLORIDE 50 MG: 50 TABLET ORAL at 20:26

## 2022-12-15 RX ADMIN — DIAZEPAM 10 MG: 5 TABLET ORAL at 06:57

## 2022-12-15 RX ADMIN — DIAZEPAM 5 MG: 5 TABLET ORAL at 03:54

## 2022-12-15 RX ADMIN — DIAZEPAM 10 MG: 5 TABLET ORAL at 16:29

## 2022-12-15 RX ADMIN — Medication 1 TABLET: at 10:35

## 2022-12-15 RX ADMIN — DIAZEPAM 10 MG: 5 TABLET ORAL at 20:26

## 2022-12-15 RX ADMIN — NICOTINE POLACRILEX 2 MG: 2 GUM, CHEWING BUCCAL at 10:35

## 2022-12-15 RX ADMIN — HYDROXYZINE HYDROCHLORIDE 25 MG: 25 TABLET ORAL at 06:11

## 2022-12-15 RX ADMIN — NICOTINE 1 PATCH: 14 PATCH, EXTENDED RELEASE TRANSDERMAL at 10:34

## 2022-12-15 RX ADMIN — FOLIC ACID 1 MG: 1 TABLET ORAL at 10:35

## 2022-12-15 RX ADMIN — HYDROXYZINE HYDROCHLORIDE 25 MG: 25 TABLET ORAL at 16:29

## 2022-12-15 RX ADMIN — NICOTINE POLACRILEX 2 MG: 2 GUM, CHEWING BUCCAL at 06:11

## 2022-12-15 RX ADMIN — DIAZEPAM 10 MG: 5 TABLET ORAL at 10:38

## 2022-12-15 RX ADMIN — THIAMINE HCL TAB 100 MG 100 MG: 100 TAB at 10:35

## 2022-12-15 ASSESSMENT — ACTIVITIES OF DAILY LIVING (ADL)
ADLS_ACUITY_SCORE: 40
WEAR_GLASSES_OR_BLIND: NO
DRESS: INDEPENDENT
CHANGE_IN_FUNCTIONAL_STATUS_SINCE_ONSET_OF_CURRENT_ILLNESS/INJURY: NO
FALL_HISTORY_WITHIN_LAST_SIX_MONTHS: NO
ADLS_ACUITY_SCORE: 40
ADLS_ACUITY_SCORE: 40
DOING_ERRANDS_INDEPENDENTLY_DIFFICULTY: NO
LAUNDRY: WITH SUPERVISION
ORAL_HYGIENE: INDEPENDENT
ADLS_ACUITY_SCORE: 40
ADLS_ACUITY_SCORE: 57
DIFFICULTY_EATING/SWALLOWING: NO
WALKING_OR_CLIMBING_STAIRS_DIFFICULTY: NO
CONCENTRATING,_REMEMBERING_OR_MAKING_DECISIONS_DIFFICULTY: NO
ADLS_ACUITY_SCORE: 37
DRESSING/BATHING_DIFFICULTY: NO
TOILETING_ISSUES: NO
ADLS_ACUITY_SCORE: 40
ADLS_ACUITY_SCORE: 37
ADLS_ACUITY_SCORE: 37
HYGIENE/GROOMING: INDEPENDENT
ADLS_ACUITY_SCORE: 40

## 2022-12-15 ASSESSMENT — LIFESTYLE VARIABLES
AUDIT-C TOTAL SCORE: 12
SKIP TO QUESTIONS 9-10: 0

## 2022-12-15 NOTE — PROGRESS NOTES
"Pt.spent day shift sleeping/napping. He was out for meals. Reported not feeling \"good\" as he needed to sleep/rest. MSSA scores were 9 & 9. Got 20mg prn valium. No medication adverse side effects observed or reported.  Affect blunted. Mood = \"tired\".  Denied SI/SIB/hallucinations. He was cooperative with care. Will continue to monitor.   "

## 2022-12-15 NOTE — CONSULTS
[facility]    Internal Medicine Initial Consult       Date of Admission: 12/14/2022  Consult Requested by: Dilshad David MD  Reason for Consult: Medical Comanagement    Assessment & Recommendations  Tate Crocker is a 37 year old man with a history of alcohol use disorder who is admitted to station 3A after presenting to ED for evaluation of alcohol detox. Admitted for further psychiatric monitoring and management.       Alcohol Use Disorder  Acute Alcohol Withdrawal  PTA, patient was consuming ~1L vodka daily. Last drink was just prior to ED arrival. Withdrawal symptoms include shakiness, anxiety, nausea, fatigue, sweatiness. No history of withdrawal seizures, no seizure-like activity recently. No history of DT.    -Continue MSSA protocol  -Management per psychiatry team  -Agree with Multivitamin, Folate, and Thiamine    Transaminitis     on admission. Most likely d/t alcohol consumption as above, Tbili and Alk Phos WNL, so less suggestive of obstructive etiology. Overall, transaminases appear better than his detox visit in 08/2022 and much better than 09/2021. On exam, no RUQ tenderness. Hemodynamically stable, no further monitored required while inpatient.  - If he develops worsening abd pain, unable to tolerate PO intake, becomes hemodynamically unstable, notify Medicine  - Follow-up with PCP on discharge w/ repeat CMP to ensure resolution   - He presently does not have a PCP, so referral placed to establish care  - Encouraged alcohol cessation    L Lateral Chest Wall Pain  Pt notes that he noticed today some pain at the L lateral chest wall, inferiorly at the costal margin. He is unsure what precipitated the pain as he has no recollection of inciting injury or other trauma to the area. On exam, no bruise, erythema, open skin, or swelling. Area is unremarkable, and no signs of flail chest or s/sx of floating ribs. Pain is unaffected by breathing,  "and is quite tender to palpation which is reassuring that this is likely more of a musculoskeletal issue vs cardiopulmonary etiology.   - APAP and Ibuprofen PRN  - Heat and cold packs for pain    Medicine will sign off, please page with any additional concerns.     Remy Quiroga PA-C  Hospitalist Service  Contact information available via [facility] Paging/Directory  ______________________________________________________________________    Reason for Admission  Alcohol Use Disorder  Acute Alcohol Withdrawal    Chief Complaint   \"I feel not great today\"    History of Present Illness   History is obtained from the patient and medical record.     aTte Crocker is a 37 year old man with a history of alcohol use disorder who is admitted to station 3A after presenting to ED for evaluation of alcohol detox. Admitted for further psychiatric monitoring and management.    Internal Medicine service was asked to see patient for a general medical evaluation. Currently, patient is seen in the exam room, sitting upright in chair. He is feeling \"not too great\" this morning, but is better than he was yesterday. Notes that he was drinking ~1L vodka daily PTA, and had been doing this for ~4 weeks following a 6 week period of sobriety. His symptoms of withdrawal are mostly tremors, nausea, anxiety, fatigue, and sweatiness all of which are improving. His most bothersome sx right now is fatigue.     Patient noticed L lateral chest wall pain today, does not recall any inciting injury. No bruises or swelling or redness at the area that he has noticed. The pain is not made worse with breathing, but is worse when touching or pressing the area. He denies any other areas of chest pain, shortness of breath, abdominal pain, vomiting, urinary or bowel changes, lower extremity swelling, vision changes, hallucinations.     Review of Systems     10 point ROS performed and negative unless otherwise noted in HPI     Past Medical History    I have " "reviewed this patient's medical history and updated it with pertinent information if needed.   Past Medical History:   Diagnosis Date     Alcohol abuse      Elevated LFTs         Past Surgical History   I have reviewed this patient's surgical history and updated it with pertinent information if needed.  History reviewed. No pertinent surgical history.     Social History   Social History     Tobacco Use     Smoking status: Every Day     Types: Vaping Device     Smokeless tobacco: Never   Substance Use Topics     Alcohol use: Yes     Comment: Pt drinks daily .50 -1 L of vodka     Drug use: No       Family History   I have reviewed this patient's family history and updated it with pertinent information if needed.   History reviewed. No pertinent family history.    Medications   No medications prior to admission.       Allergies      Allergies   Allergen Reactions     Metal [Staples]      Rash        Physical Exam   /75 (BP Location: Left arm, Patient Position: Sitting)   Pulse 77   Temp 97.4  F (36.3  C) (Temporal)   Resp 16   Ht 1.778 m (5' 10\")   Wt 83.9 kg (185 lb)   SpO2 96%   BMI 26.54 kg/m     GENERAL: WDWN, NAD, slightly diaphoretic. Alert and awake, sitting upright in chair.  HEENT: Anicteric sclera. Mucous membranes moist.   CV: RRR. S1, S2. No murmurs appreciated.   RESPIRATORY: Effort normal on room air. Lungs CTAB with no wheezing, rales, rhonchi.   GI: Abdomen soft, non distended, non tender.   NEUROLOGICAL: No focal deficits. Moves all extremities.   MSK: Tenderness to left lateral chest wall at the left inferior costal margin.  EXTREMITIES: No peripheral edema. Warm and well perfused.   SKIN: No jaundice. No rashes, erythema, bruising on visualized skin (torso, back, abdomen, BUEs, BLEs).      Data   Data reviewed today: I reviewed all medications, new labs and imaging results over the last 24 hours.     "

## 2022-12-15 NOTE — ED NOTES
ED to Behavioral Floor Handoff    SITUATION  Tate Crocker is a 37 year old male who speaks English and lives in a home with others The patient arrived in the ED by private car from emergency room with a complaint of Alcohol Intoxication (Last drink prior to arrival, drink of choice vodka, 1L a day)  .The patient's current symptoms started/worsened 6 month(s) ago and during this time the symptoms have increased.   In the ED, pt was diagnosed with   Final diagnoses:   None        Initial vitals were: BP: (!) 145/103  Pulse: 77  Temp: 98.3  F (36.8  C)  Resp: 16  Weight: 83.9 kg (185 lb)  SpO2: 98 %   --------  Is the patient diabetic? No   If yes, last blood glucose? --     If yes, was this treated in the ED? --  --------  Is the patient inebriated (ETOH) Yes or Impaired on other substances? No  MSSA done? Yes  Last MSSA score: 9    Were withdrawal symptoms treated? Yes  Does the patient have a seizure history? No. If yes, date of most recent seizure--  --------  Is the patient patient experiencing suicidal ideation? denies current or recent suicidal ideation     Homicidal ideation? denies current or recent homicidal ideation or behaviors.    Self-injurious behavior/urges? denies current or recent self injurious behavior or ideation.  ------  Was pt aggressive in the ED No  Was a code called No  Is the pt now cooperative? Yes  -------  Meds given in ED:   Medications   nicotine (NICORETTE) gum 2 mg (2 mg Buccal Given 12/14/22 4570)   diazepam (VALIUM) tablet 5-20 mg (5 mg Oral Given 12/15/22 1164)   thiamine (B-1) tablet 100 mg (has no administration in time range)   folic acid (FOLVITE) tablet 1 mg (has no administration in time range)   multivitamin w/minerals (THERA-VIT-M) tablet 1 tablet (has no administration in time range)   lidocaine (viscous) (XYLOCAINE) 2 % 15 mL, alum & mag hydroxide-simethicone (MAALOX) 15 mL GI Cocktail (30 mLs Oral Not Given 12/15/22 0302)      Family present during ED course?  Yes  Family currently present? Yes    BACKGROUND  Does the patient have a cognitive impairment or developmental disability? No  Allergies:   Allergies   Allergen Reactions     Metal [Staples]      Rash    .   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         ASSESSMENT  Labs results   Labs Ordered and Resulted from Time of ED Arrival to Time of ED Departure   COMPREHENSIVE METABOLIC PANEL - Abnormal       Result Value    Sodium 141      Potassium 4.1      Chloride 110 (*)     Carbon Dioxide (CO2) 26      Anion Gap 5      Urea Nitrogen 7      Creatinine 0.82      Calcium 8.8      Glucose 91      Alkaline Phosphatase 86       (*)      (*)     Protein Total 7.8      Albumin 3.7      Bilirubin Total 0.3      GFR Estimate >90     ALCOHOL BREATH TEST POCT - Abnormal    Alcohol Breath Test 0.257 (*)    DRUG ABUSE SCREEN 1 URINE (ED) - Normal    Amphetamines Urine Screen Negative      Barbiturates Urine Screen Negative      Benzodiazepines Urine Screen Negative      Cannabinoids Urine Screen Negative      Cocaine Urine Screen Negative      Opiates Urine Screen Negative     COVID-19 VIRUS (CORONAVIRUS) BY PCR - Normal    SARS CoV2 PCR Negative     LIPASE - Normal    Lipase 118     CBC WITH PLATELETS AND DIFFERENTIAL    WBC Count 4.9      RBC Count 4.42      Hemoglobin 14.6      Hematocrit 41.6      MCV 94      MCH 33.0      MCHC 35.1      RDW 13.3      Platelet Count 206      % Neutrophils 50      % Lymphocytes 36      % Monocytes 10      %  Eosinophils 3      % Basophils 1      % Immature Granulocytes 0      NRBCs per 100 WBC 0      Absolute Neutrophils 2.5      Absolute Lymphocytes 1.8      Absolute Monocytes 0.5      Absolute Eosinophils 0.1      Absolute Basophils 0.0      Absolute Immature Granulocytes 0.0      Absolute NRBCs 0.0        Imaging Studies: No results found for this or any previous visit (from the past 24 hour(s)).   Most recent vital signs BP (!) 138/95   Pulse 76   Temp 97.7  F (36.5  C) (Oral)   Resp 16   Wt 83.9 kg (185 lb)   SpO2 95%   BMI 25.80 kg/m     Abnormal labs/tests/findings requiring intervention:---   Pain control: pt had none  Nausea control: pt had none    RECOMMENDATION  Are any infection precautions needed (MRSA, VRE, etc.)? No If yes, what infection? --  ---  Does the patient have mobility issues? independently. If yes, what device does the pt use? ---  ---  Is patient on 72 hour hold or commitment? No If on 72 hour hold, have hold and rights been given to patient? N/A  Are admitting orders written if after 10 p.m. ?Yes  Tasks needing to be completed:---     Umu Allen, RN    [phone] [facility]

## 2022-12-15 NOTE — PROGRESS NOTES
12/15/22 0523   Patient Belongings   Did you bring any home meds/supplements to the hospital?  No   Patient Belongings locker   Patient Belongings Put in Hospital Secure Location (Security or Locker, etc.) cell phone/electronics;clothing;shoes   Belongings Search Yes   Clothing Search Yes   Second Staff Rylan Olson All of patient's belongings are in a bin and his discharge bin.     BIN:  Pants, belt, shirt, cap, a vape, shoes and socks    DISCHARGE BIN:  Phone    A               Admission:  I am responsible for any personal items that are not sent to the safe or pharmacy.  [facility] is not responsible for loss, theft or damage of any property in my possession.    Signature:  _________________________________ Date: _______  Time: _____                                              Staff Signature:  ____________________________ Date: ________  Time: _____      2nd Staff person, if patient is unable/unwilling to sign:    Signature: ________________________________ Date: ________  Time: _____     Discharge:  [facility] has returned all of my personal belongings:    Signature: _________________________________ Date: ________  Time: _____                                          Staff Signature:  ____________________________ Date: ________  Time: _____

## 2022-12-15 NOTE — PLAN OF CARE
"  Problem: Substance Withdrawal  Goal: Substance Withdrawal  Description: Signs and symptoms of listed problems will be absent or manageable.  Outcome: Progressing   Goal Outcome Evaluation:       Pt.admitted from [facility] adult ED due to alcohol intoxication. He is voluntarily seeking for detox.     Pt.reported he was admitted in August. He was sober until last month. Endorsed worsening alcohol use due to relationship issue. Cooperative with admission protocols and care.     Denied SI/SIB/hallucinations. Denied history of withdrawal seizures and DTs. Started on MSSA with valium protocol. Admission vital signs /75 (BP Location: Left arm, Patient Position: Sitting)   Pulse 77   Temp 97.4  F (36.3  C) (Temporal)   Resp 16   Ht 1.778 m (5' 10\")   Wt 83.9 kg (185 lb)   SpO2 96%   BMI 26.54 kg/m    Scored 9 on MSSA. Administered prn valium as ordered.     Staff started 15 minutes safety checks. Will continue to monitor.                    "

## 2022-12-15 NOTE — PLAN OF CARE
Behavioral Team Discussion: (12/15/2022)    Continued Stay Criteria/Rationale: Patient admitted for alcohol Use Disorder.  Plan: The following services will be provided to the patient; psychiatric assessment, medication management, therapeutic milieu, individual and group support, and skills groups.   Participants: 3A Provider: Dr. Jah Magallon MD; 3A RN: Regine Baca, RN; 3A CM's: Alise Quinn , Breanna Romo and Shana Bowman.  Summary/Recommendation: Providers will assess today for treatment recommendations, discharge planning, and aftercare plans. CM will meet with pt for discharge planning.   Medical/Physical: None noted  Precautions:   Behavioral Orders   Procedures     Code 1 - Restrict to Unit     Routine Programming     As clinically indicated     Status 15     Every 15 minutes.     Withdrawal precautions     Rationale for change in precautions or plan: N/A  Progress: Initial.    ASAM Dimension Scale Ratings:  Dimension 1: 2 Client has some difficulty tolerating and coping with withdrawal discomfort. Client's intoxication may be severe, but responds to support and treatment such that the client does not immediately endanger self or others. Client displays moderate signs and symptoms with moderate risk of severe withdrawal.  Dimension 2: 1 Client tolerates and rajan with physical discomfort and is able to get the services that the client needs.  Dimension 3: 2 Client has difficulty with impulse control and lacks coping skills. Client has thoughts of suicide or harm to others without means; however, the thoughts may interfere with participation in some treatment activities. Client has difficulty functioning in significant life areas. Client has moderate symptoms of emotional, behavioral, or cognitive problems. Client is able to participate in most treatment activities.  Dimension 4: 1 Client is motivated with active reinforcement, to explore treatment and strategies for change, but ambivalent about  illness or need for change.  Dimension 5: 4 No awareness of the negative impact of mental health problems or substance abuse. No coping skills to arrest mental health or addiction illnesses, or prevent relapse.  Dimension 6: 3 Client is not engaged in structured, meaningful activity and the client's peers, family, significant other, and living environment are unsupportive, or there is significant criminal justice system involvement.

## 2022-12-15 NOTE — PROGRESS NOTES
Writer met with patient, he remained in bed with eyes closed, but did respond to questioning. Patient states that he is here to detox from alcohol, he works from home and does not currently have insurance, intake notified financial counseling regarding insurance status. Patient would like to do some level of EMMY programming but is undecided as to what he wants, is open to recommendations. Explained  role and encouraged patient to fill out paperwork.    Shana Bowman, LPCC, LPC, LADC

## 2022-12-15 NOTE — PHARMACY-ADMISSION MEDICATION HISTORY
Admission Medication History status for the 12/14/2022 admission is complete.  See EPIC admission navigator for Prior to Admission medications.    Medication history sources:  patient and dispense report     Medication history source reliability: Good    Medication adherence:  n/a    Changes made to PTA medication list (reason)  Added: n/a  Deleted: n/a  Changed: n/a    Additional medication history information (including reliability of information, actions taken by pharmacist): patient reprots that she is not taking any prescribed or OTC medications at home prior to admission.     Time spent in this activity: 10 minutes     Medication history completed by: Kierra Orozco, Pharm.D    Prior to Admission medications    Not on File

## 2022-12-15 NOTE — ED TRIAGE NOTES
Pt presents to the ED seeking detox off of alcohol. Pt states he drdinks around 1 L of vodka daily. Pt denies any alcohol withdrawal seizure history, but has tremors with withdrawls. Pt denies feeling SI/HI.     Triage Assessment     Row Name 12/14/22 2794       Triage Assessment (Adult)    Airway WDL WDL       Respiratory WDL    Respiratory WDL WDL       Skin Circulation/Temperature WDL    Skin Circulation/Temperature WDL WDL       Cardiac WDL    Cardiac WDL WDL       Peripheral/Neurovascular WDL    Peripheral Neurovascular WDL WDL       Cognitive/Neuro/Behavioral WDL    Cognitive/Neuro/Behavioral WDL WDL

## 2022-12-15 NOTE — H&P
Tate Crocker is a 37 year old male    History was provided by PATEINT who was a fair historian.   CHIEF COMPLAINT:    alcohol  HISTORY OF PRESENT ILLNESS:      Pt has h/o of opiate addiction , he was addicted to opiates from [age].  He quit opiates in 2018.  Patient was abusing Percocet at that time he also got street supply of Suboxone and methadone.  He quit opiates in 2018.   he reports he went to work in 2019 with a man who wanted to drink alcohol after work that is when his drinking increased.  He reports all his friends drink he craves alcohol and wants to drink    He was here in detox August 12 to August 14 he was sober 6 weeks after that.  He relapsed and has been drinking 1 L a day.  He reports that he has marital problems        Patient has been using the following substances:   Started at age 20, became a problem at 2019  Withdrawal symptoms include tremors shakes, anxiety, poor appetite     Patient has tolerance, withdrawal, progressive use, loss of control, spending more time and more amount than intended. Patient has made attempts to quit, is experiencing cravings, and reports negative consequences.               alcohol      USE DISORDER - CRITERIA  +admits to taking larger amounts than initially intended  +admits to unsuccessful efforts to cut down or control use  +admits to spending a great deal of time in activities necessary to obtain, use and recover from  +admits to cravings or a strong desire to use   + admits to failure to fulfill obligations at work, school or home  + admits to continued use despite negative consequences  + admits to giving up important activities to use  +admits to use in situations in which it is physically dangerous        Patient does not have a history of seizures.  Patient does not have a history of delirium tremens.             Denies thoughts of suicide or harming others.      Denies auditory or visual hallucinations.     Patient vapes all day     Patient  denied any gambling    Substance Age first use First became regular or problematic Most recent use            Cannabis      Cocaine NONE       Stimulants NONE              Sedatives NONE       Hallucinogens NONE       Inhalants NONE       Other         OTC drugs NONE       Nicotine         Patient does not have a history of overdose.  Patient does not have a history of IV use.  Patient does not have a history of hepatitis, HIV,      PSYCHIATRIC REVIEW OF SYSTEMS:         Psychiatric Review of Systems:   Depression:     Denies: depressed mood, suicidal ideation, decreased interest, changes in sleep, changes in appetite, guilt, hopelessness, helplessness, impaired concentration, decreased energy, irritability.  Bre:       Denies: sleeplessness, increased goal-directed activities, abrupt increase in energy, pressured speech   impulsiveness, racing thoughts, increased goal-directed activities, pressured speech, increase in energy  Bre Feeling euphoric,Distractible,Impulsive,Grandiose,Talking excessively,Have energy without sleeping,Mood swings,Irritability  Psychosis:     Denies: visual hallucinations, auditory hallucinations, paranoia  Anxiety:   denied excessive worries that are difficult to control for the past 6 months,   Chronic anxiety , not able to stop worrying impacting sleep, poor conc, irritable , muscle tension fatigue    denies any Panic attacks  Pt has following s/o of anxiety  Palpitation pounding heart trembling shaking shortness of breath feeling of choking chest pain nausea feeling dizzy chills numbness derealization fear of dying fear of losing control    Come out of the blue    Denies: worries that are difficult to control for the past 6 months, panic attacks    Social anxiety disorder  reports : Marked anxiety about 1 or more social situations in which patient is exposed to scrutiny ofothers and patient fears patient will act in the way that patient that will be negatively  causes significant  impairment  Patient is afraid of being judged scrutinized  Patient avoids social situations  PTSD:  Denies: re-experiencing past trauma, nightmares, increased arousal, avoidance of traumatic stimuli, impaired function.  OCD:   denies obsessions, patient has compulsions checking, counting symmetry, cleaning, skin picking.    ED:     Denies: restriction, binging, purging.       patient denies :symptoms of attention deficit disorder include a failure to pay attention to detail, a pattern of careless mistakes, a pattern of inattentive listening, a failure to follow through with projects, poor personal organization, losing necessary objects, distractibility, forgetfulness.      patient denies Symptoms of borderline personality disorder include a fear of abandonment, unstable self-image, impulsive behavior, affective instability due to marked reactivity and mood lasting hours dissociative feeling, intense anger, unstable personal relationships, chronic feelings of boredom, periods of intense depressed mood.  Transient stressed related paranoid ideation severe dissociative symptoms              PSYCHIATRIC HISTORY     Previous diagnoses:       Symptoms in relation to substance use (symptoms present without use present     Past court commitments: none  SIB /SUICIDE ATTEMPTS NONE  Psych Hosp :none  Outpatient Programs none  Inpatient cd trt none  Out pt cd trt none   Detoxes2  PAST PSYCH MED TRIALS         SOCIAL HISTORY                                                                             Patient is   Patient has 0  children  Patient is employed as a   salesman  Patient's housing is appt         Family History:   FAMILY HISTORY: History reviewed. No pertinent family history.  Family Mental Health History-  none    Substance Use Problems - present for mgf has alcoholism              PTA Medications:     No medications prior to admission.          Allergies:     Allergies   Allergen Reactions     Metal [Staples]       Rash           Labs:     Recent Results (from the past 48 hour(s))   Drug abuse screen 1 urine (ED)    Collection Time: 12/14/22 11:15 PM   Result Value Ref Range    Amphetamines Urine Screen Negative Screen Negative    Barbiturates Urine Screen Negative Screen Negative    Benzodiazepines Urine Screen Negative Screen Negative    Cannabinoids Urine Screen Negative Screen Negative    Cocaine Urine Screen Negative Screen Negative    Opiates Urine Screen Negative Screen Negative   Alcohol breath test POCT    Collection Time: 12/14/22 11:19 PM   Result Value Ref Range    Alcohol Breath Test 0.257 (A) 0.00 - 0.01   Comprehensive metabolic panel    Collection Time: 12/14/22 11:40 PM   Result Value Ref Range    Sodium 141 133 - 144 mmol/L    Potassium 4.1 3.4 - 5.3 mmol/L    Chloride 110 (H) 94 - 109 mmol/L    Carbon Dioxide (CO2) 26 20 - 32 mmol/L    Anion Gap 5 3 - 14 mmol/L    Urea Nitrogen 7 7 - 30 mg/dL    Creatinine 0.82 0.66 - 1.25 mg/dL    Calcium 8.8 8.5 - 10.1 mg/dL    Glucose 91 70 - 99 mg/dL    Alkaline Phosphatase 86 40 - 150 U/L     (H) 0 - 45 U/L     (H) 0 - 70 U/L    Protein Total 7.8 6.8 - 8.8 g/dL    Albumin 3.7 3.4 - 5.0 g/dL    Bilirubin Total 0.3 0.2 - 1.3 mg/dL    GFR Estimate >90 >60 mL/min/1.73m2   CBC with platelets and differential    Collection Time: 12/14/22 11:40 PM   Result Value Ref Range    WBC Count 4.9 4.0 - 11.0 10e3/uL    RBC Count 4.42 4.40 - 5.90 10e6/uL    Hemoglobin 14.6 13.3 - 17.7 g/dL    Hematocrit 41.6 40.0 - 53.0 %    MCV 94 78 - 100 fL    MCH 33.0 26.5 - 33.0 pg    MCHC 35.1 31.5 - 36.5 g/dL    RDW 13.3 10.0 - 15.0 %    Platelet Count 206 150 - 450 10e3/uL    % Neutrophils 50 %    % Lymphocytes 36 %    % Monocytes 10 %    % Eosinophils 3 %    % Basophils 1 %    % Immature Granulocytes 0 %    NRBCs per 100 WBC 0 <1 /100    Absolute Neutrophils 2.5 1.6 - 8.3 10e3/uL    Absolute Lymphocytes 1.8 0.8 - 5.3 10e3/uL    Absolute Monocytes 0.5 0.0 - 1.3 10e3/uL     "Absolute Eosinophils 0.1 0.0 - 0.7 10e3/uL    Absolute Basophils 0.0 0.0 - 0.2 10e3/uL    Absolute Immature Granulocytes 0.0 <=0.4 10e3/uL    Absolute NRBCs 0.0 10e3/uL   Lipase    Collection Time: 12/14/22 11:40 PM   Result Value Ref Range    Lipase 118 73 - 393 U/L   Asymptomatic COVID-19 Virus (Coronavirus) by PCR Nasopharyngeal    Collection Time: 12/14/22 11:41 PM    Specimen: Nasopharyngeal; Swab   Result Value Ref Range    SARS CoV2 PCR Negative Negative         /75 (BP Location: Left arm, Patient Position: Sitting)   Pulse 77   Temp 97.4  F (36.3  C) (Temporal)   Resp 16   Ht 1.778 m (5' 10\")   Wt 83.9 kg (185 lb)   SpO2 96%   BMI 26.54 kg/m    Weight is 185 lbs 0 oz  Body mass index is 26.54 kg/m .    Physical Exam:     ROS: 10 point ROS neg other than the symptoms noted above in the HPI.            Past Medical History:   PAST MEDICAL HISTORY:   Past Medical History:   Diagnosis Date     Alcohol abuse      Elevated LFTs        PAST SURGICAL HISTORY: History reviewed. No pertinent surgical history.    -    -           MENTAL STATUS EXAM:      Constitutional: General appearance of patient:  Appearance:  awake, alert, appeared as age stated, adequate groomed and slightly unkempt  Attitude:  cooperative  Eye Contact:  good  Mood:  anxious  Affect:  congruent   Speech:  clear, coherent normal rate   Psychomotor Behavior:  no evidence of tardive dyskinesia, dystonia, or tics  Thought Process:  logical, linear and goal oriented  Associations:  no loose associations  Thought Content:  no evidence of psychotic thought and active suicidal ideation present  Denied any active suicidal /homicidation ideation plan intent   Insight:  fair  Judgment:  fair  Oriented to:  time, person, and place  Attention Span and Concentration:  intact  Recent and Remote Memory:  intact  Language:  english with appropriate syntax and vocabulary  Fund of Knowledge: appropriate  Muscle Strength and Tone: normal  Gait and " Station: Normal     There are no abnormal or psychotic thoughts, no preoccupations, no overvalued ideas, no rumination, no obsessions, no compulsions, no somatic concerns, no hypochrondriasis, no ideas of reference, and no delusions.  Patient denies homicidal thoughts.   Patient denies suicidal thoughts.  Patient appears to have good judgment and good insight.     Musculoskeletal: Patient shows no abnormalities of motor activity: there is no tremor, no tic, and no dystonia.  There is no apparent muscle atrophy, strength and tone appear normal, and there are no abnormal movements.  Patient has normal gait and stance.    DISCUSSION:         Assessment:       Patient has a biological predisposition with family history positive for alcoholism  Psychologically patient is experiencing alcoholism  Patient has these particular stressors marital stress  Patient has chronic illness exacerbation leading to hospitalization progression as described.     Patient has been unable to stop using drugs in the community due to both physical and psychological symptoms.  Continued use will put the patient at risk for medical and/or psychiatric complications.      Inpatient psychiatric hospitalization is warranted at this time for safety, stabilization, and possible adjustment in medications.       Diagnoses:       Alcohol use disorder severe  Alcohol withdrawal severe  Nicotine abuse         Plan:   Problem list  1#alcohol issues are severe alcohol withdrawal severe     - MSSA protocol using Valium for management of alcohol withdrawal    - Continue thiamine, folate, and multivitamin daily    MSSA    Eating Disturbances: ate and enjoyed all of it or not applicable  Tremor: 3  Sleep Disturbance: slept through the night or not applicable  Clouding of Sensorium: no evidence  Hallucinations: 0 - none  Quality of Contact: 0 - awareness of examiner and people around him/her  Agitation: 2  Paroxysmal Sweats: 2  Temperature: 99.5 or below  Pulse:  1 - 70 to 79  Total MSSA Score: 9   Patient's breathalyzer when he came to 0.257 last drink was yesterday  Patient received 10 mg of diazepam since morning and since admission is received 25 mg of diazepam  2#patient's AST is 118 and ALT is 114 most likely from alcoholism nonviral suspected        - Consider anti-craving medications prior to discharge. Pt willing to review additional information about both naltrexone and Antabuse.  -Alcohol withdrawal nausea prn Zofran as needed for nausea     hydroxyzine 25 mg q4h prn for acute anxiety  Trazodone 50 mg at bedtime prn for sleep disturbances       Patient has been unable to stop using drugs in the community due to both physical and psychological symptoms.  Continued use will put the patient at risk for medical and/or psychiatric complications.    I HAVE REVIEWED LABS WITH PT AND TALKED ABOUT RESULTS WITH PT  I HAVE REVIEWED AND SUMMARIZED OLD RECORDS including his medication reconcilation of his home medications  and PDMP   I HAVE SPOKEN WITH RN ABOUT MEDICATIONS AND DETOX SCORES  I HAVE SPOKEN WITH CM ABOUT PTS TREATMENT OPTIONS     Discussed in detail about patient's smoking patient was advised to quit patient was told about the impact of smoking.  Patient's willingness to quit was assessed.  I provided methods and skills for cessation including medication management nicotine gum patch.  Patient did not set a quit date.  Patient is interested in quitting .we discussed pharmacotherapy options .patient agreed to take nicotine gum patch lozenge.  We discussed behavioral change techniques when craving nicotine including deep breathing drinking glass of water, taking a walk.            Laboratory/Imaging:    Liver Function Studies -   Recent Labs   Lab Test 12/14/22  2340   PROTTOTAL 7.8   ALBUMIN 3.7   BILITOTAL 0.3   ALKPHOS 86   *   *      Last Comprehensive Metabolic Panel:  Sodium   Date Value Ref Range Status   12/14/2022 141 133 - 144 mmol/L Final    02/20/2018 134 133 - 144 mmol/L Final     Potassium   Date Value Ref Range Status   12/14/2022 4.1 3.4 - 5.3 mmol/L Final   02/20/2018 3.8 3.4 - 5.3 mmol/L Final     Chloride   Date Value Ref Range Status   12/14/2022 110 (H) 94 - 109 mmol/L Final   02/20/2018 100 94 - 109 mmol/L Final     Carbon Dioxide   Date Value Ref Range Status   02/20/2018 28 20 - 32 mmol/L Final     Carbon Dioxide (CO2)   Date Value Ref Range Status   12/14/2022 26 20 - 32 mmol/L Final     Anion Gap   Date Value Ref Range Status   12/14/2022 5 3 - 14 mmol/L Final   02/20/2018 6 3 - 14 mmol/L Final     Glucose   Date Value Ref Range Status   12/14/2022 91 70 - 99 mg/dL Final   02/20/2018 102 (H) 70 - 99 mg/dL Final     Urea Nitrogen   Date Value Ref Range Status   12/14/2022 7 7 - 30 mg/dL Final   02/20/2018 11 7 - 30 mg/dL Final     Creatinine   Date Value Ref Range Status   12/14/2022 0.82 0.66 - 1.25 mg/dL Final   02/20/2018 0.95 0.66 - 1.25 mg/dL Final     GFR Estimate   Date Value Ref Range Status   12/14/2022 >90 >60 mL/min/1.73m2 Final     Comment:     Effective December 21, 2021 eGFRcr in adults is calculated using the 2021 CKD-EPI creatinine equation which includes age and gender (Mena et al., NEJ, DOI: 10.1056/GEQWjb5833481)   02/20/2018 >90 >60 mL/min/1.7m2 Final     Comment:     Non  GFR Calc     Calcium   Date Value Ref Range Status   12/14/2022 8.8 8.5 - 10.1 mg/dL Final   02/20/2018 9.1 8.5 - 10.1 mg/dL Final     Bilirubin Total   Date Value Ref Range Status   12/14/2022 0.3 0.2 - 1.3 mg/dL Final   02/20/2018 0.3 0.2 - 1.3 mg/dL Final     Alkaline Phosphatase   Date Value Ref Range Status   12/14/2022 86 40 - 150 U/L Final   02/20/2018 73 40 - 150 U/L Final     ALT   Date Value Ref Range Status   12/14/2022 114 (H) 0 - 70 U/L Final   02/20/2018 26 0 - 70 U/L Final     AST   Date Value Ref Range Status   12/14/2022 118 (H) 0 - 45 U/L Final   02/20/2018 24 0 - 45 U/L Final                   Medical  "treatment/interventions:  Medical concerns: As above    - Consults: IM consult placed. Appreciate assistance.     Legal Status: Voluntary     Safety Assessment:   Checks: Status 15  Pt has not required locked seclusion or restraints in the past 24 hours to maintain safety, please refer to RN documentation for further details.    The risks, benefits, alternatives and side effects have been discussed and are understood by the patient.       Patient will be treated in therapeutic milieu with appropriate individual and group therapies as described.  Disposition: Pending clinical stabilization. Pt does  appear interested in COMPLETE DETOX AND DO TRT  Length of stay 3-5 days        \"Much or all of the text in this note was generated through the use of Dragon Dictate voice to text software. Errors in spelling or words which appear to be out of contact are unintentional, may be present due having escaped editing\"     "

## 2022-12-15 NOTE — ED PROVIDER NOTES
Sign out Provider: Silvano  Sign out Plan: 37-year-old male with a history of alcohol abuse who presents to the emergency department with alcohol intoxication seeking detox.  Patient is currently pending laboratory evaluation for medical clearance to detox.  Denies any acute medical concerns.    Reassessment: Laboratory evaluation returned.  LFTs continue to be somewhat elevated but improved from prior.  Negative urine drug screen, negative COVID.  He was placed on MSSA protocol and thiamine, folate and multivitamin ordered.  Will plan to admit to detox for further management.    Disposition: Admit           Josefa, Tammy Malcolm MD  12/15/22 9511     GI bleed

## 2022-12-16 LAB
ALBUMIN SERPL-MCNC: 3.3 G/DL (ref 3.4–5)
ALP SERPL-CCNC: 92 U/L (ref 40–150)
ALT SERPL W P-5'-P-CCNC: 121 U/L (ref 0–70)
AST SERPL W P-5'-P-CCNC: 172 U/L (ref 0–45)
BILIRUB DIRECT SERPL-MCNC: 0.1 MG/DL (ref 0–0.2)
BILIRUB SERPL-MCNC: 0.8 MG/DL (ref 0.2–1.3)
CHOLEST SERPL-MCNC: 193 MG/DL
FOLATE SERPL-MCNC: 13.2 NG/ML (ref 4.6–34.8)
GGT SERPL-CCNC: 359 U/L (ref 0–75)
HDLC SERPL-MCNC: 78 MG/DL
HOLD SPECIMEN: NORMAL
LDLC SERPL CALC-MCNC: 90 MG/DL
NONHDLC SERPL-MCNC: 115 MG/DL
PROT SERPL-MCNC: 7.5 G/DL (ref 6.8–8.8)
TRIGL SERPL-MCNC: 127 MG/DL
TSH SERPL DL<=0.005 MIU/L-ACNC: 2.94 MU/L (ref 0.4–4)
VIT B12 SERPL-MCNC: 532 PG/ML (ref 232–1245)

## 2022-12-16 PROCEDURE — 82977 ASSAY OF GGT: CPT | Performed by: PSYCHIATRY & NEUROLOGY

## 2022-12-16 PROCEDURE — 80076 HEPATIC FUNCTION PANEL: CPT | Performed by: PSYCHIATRY & NEUROLOGY

## 2022-12-16 PROCEDURE — 84443 ASSAY THYROID STIM HORMONE: CPT | Performed by: PSYCHIATRY & NEUROLOGY

## 2022-12-16 PROCEDURE — 99233 SBSQ HOSP IP/OBS HIGH 50: CPT | Performed by: PSYCHIATRY & NEUROLOGY

## 2022-12-16 PROCEDURE — 36415 COLL VENOUS BLD VENIPUNCTURE: CPT | Performed by: PSYCHIATRY & NEUROLOGY

## 2022-12-16 PROCEDURE — 82746 ASSAY OF FOLIC ACID SERUM: CPT | Performed by: PSYCHIATRY & NEUROLOGY

## 2022-12-16 PROCEDURE — 82607 VITAMIN B-12: CPT | Performed by: PSYCHIATRY & NEUROLOGY

## 2022-12-16 PROCEDURE — 250N000013 HC RX MED GY IP 250 OP 250 PS 637: Performed by: PSYCHIATRY & NEUROLOGY

## 2022-12-16 PROCEDURE — 80061 LIPID PANEL: CPT | Performed by: PSYCHIATRY & NEUROLOGY

## 2022-12-16 PROCEDURE — H2035 A/D TX PROGRAM, PER HOUR: HCPCS | Mod: HQ

## 2022-12-16 PROCEDURE — 128N000004 HC R&B CD ADULT

## 2022-12-16 RX ADMIN — DIAZEPAM 10 MG: 5 TABLET ORAL at 12:33

## 2022-12-16 RX ADMIN — Medication 1 TABLET: at 08:46

## 2022-12-16 RX ADMIN — THIAMINE HCL TAB 100 MG 100 MG: 100 TAB at 08:46

## 2022-12-16 RX ADMIN — NICOTINE POLACRILEX 2 MG: 2 GUM, CHEWING BUCCAL at 08:46

## 2022-12-16 RX ADMIN — TRAZODONE HYDROCHLORIDE 50 MG: 50 TABLET ORAL at 00:08

## 2022-12-16 RX ADMIN — HYDROXYZINE HYDROCHLORIDE 25 MG: 25 TABLET ORAL at 20:42

## 2022-12-16 RX ADMIN — TRAZODONE HYDROCHLORIDE 50 MG: 50 TABLET ORAL at 22:42

## 2022-12-16 RX ADMIN — HYDROXYZINE HYDROCHLORIDE 25 MG: 25 TABLET ORAL at 16:34

## 2022-12-16 RX ADMIN — NICOTINE POLACRILEX 2 MG: 2 GUM, CHEWING BUCCAL at 16:34

## 2022-12-16 RX ADMIN — ACETAMINOPHEN 650 MG: 325 TABLET, FILM COATED ORAL at 20:42

## 2022-12-16 RX ADMIN — DIAZEPAM 10 MG: 5 TABLET ORAL at 00:08

## 2022-12-16 RX ADMIN — HYDROXYZINE HYDROCHLORIDE 25 MG: 25 TABLET ORAL at 08:45

## 2022-12-16 RX ADMIN — NICOTINE POLACRILEX 2 MG: 2 GUM, CHEWING BUCCAL at 20:46

## 2022-12-16 RX ADMIN — FOLIC ACID 1 MG: 1 TABLET ORAL at 08:45

## 2022-12-16 ASSESSMENT — ACTIVITIES OF DAILY LIVING (ADL)
ADLS_ACUITY_SCORE: 40
LAUNDRY: WITH SUPERVISION
ORAL_HYGIENE: INDEPENDENT
ADLS_ACUITY_SCORE: 40
HYGIENE/GROOMING: INDEPENDENT
ADLS_ACUITY_SCORE: 40
DRESS: INDEPENDENT

## 2022-12-16 NOTE — PLAN OF CARE
"  Problem: Alcohol Withdrawal  Goal: Alcohol Withdrawal Symptom Control  Outcome: Progressing   Patient remains calm, pleasant and cooperative in the melieu. Appropriately engaged with peers and staff.  Patient out in the milieu socializing with select peers.MSSA score this shift was 10 & 9  and he received valium 10 mg x 2.Medication compliant. Contracts for safety. No new concerns noted. Staff will continue to monitor and assist as needed.  VS BP (!) 139/101   Pulse 89   Temp 97.6  F (36.4  C) (Temporal)   Resp 16   Ht 1.778 m (5' 10\")   Wt 83.9 kg (185 lb)   SpO2 96%   BMI 26.54 kg/m      " Selene said, patient was seen at [facility] for a Hiatal hernia. There was some confusion with the referral but it has since been straightened out.    Noemy Holt LPN on 5/22/2020 at 8:51 AM

## 2022-12-16 NOTE — ED PROVIDER NOTES
[facility] EMERGENCY DEPARTMENT ([facility])    12/14/22      ED PROVIDER NOTE     History           Chief Complaint   Patient presents with     Alcohol Intoxication       Last drink prior to arrival, drink of choice vodka, 1L a day      HPI  Tate Crocker is a 37 year old male w/ PMH notable for alcohol use disorder who presents seeking alcohol detox. History provided by the patient and his loved one/guest, as well as medical records.     CURRENT PRESENTATION:  Patient presents today in request for detox.  He has a PMH notable for alcohol dependence.  He reports drinking about 1 L of vodka a day, which he has been doing for many months.  He will get symptomatic with withdrawal if he does not drink (he reports that he has a breathalyzer at home and so he even knows to what levels he gets various types of symptoms), but denies ever having a withdrawal seizure or DTs.       He reports he has some baseline anxiety and depression but no worse than usual, no acute stressors, no SI or HI.  He reports that he has felt well recently otherwise physically no recent colds, flus, etc.  No traumas or falls or other accidents.  He feels physically improved if he is drinking, but at various levels of withdrawal he will develop symptoms like body aches, nausea, abdominal discomfort, and fatigue.  No blood in the stools, no bloody vomit, no urinary symptoms or changes, no other  symptoms or changes and no change to bowel.  No extremity symptoms     His last drink was just before coming in tonight and he is not currently feeling as though he is in active withdrawal.  He has a mild degree of GI upset as he is starting to be a bit less intoxicated than he was earlier, but no severe abdominal pain.  No other new symptoms or complaints this time.  Please see ROS for further details.      This part of the document was transcribed by Becki Briggs, Medical Scribe.       I have reviewed the Medications, Allergies, Past  "Medical and Surgical History, and Social History in the [facility] system.  Past Medical History        Past Medical History:   Diagnosis Date     Alcohol abuse       Elevated LFTs         Alcohol dependence, alcohol withdrawal (no prior history of DTs/withdrawal seizures)  Depression, Anxiety     Past Surgical History   History reviewed. No pertinent surgical history.     Past medical history, past surgical history, medications, and allergies were reviewed with the patient. Additional pertinent items: None     Family History   History reviewed. No pertinent family history.        Social History      Tobacco Use     Smoking status: Every Day       Types: Vaping Device     Smokeless tobacco: Never   Substance Use Topics     Alcohol use: Yes       Comment: Pt drinks daily .50 -1 L of vodka      Social history was reviewed with the patient. Additional pertinent items: None     Review of Systems   Constitutional: Positive for fatigue (with withdrawal). Negative for chills, diaphoresis and fever.   Respiratory: Negative.  Negative for cough and shortness of breath.    Cardiovascular: Negative.  Negative for chest pain and leg swelling.   Gastrointestinal: Positive for nausea (similarly worse with withdrawal). Negative for blood in stool and vomiting. Abdominal pain: mild GI upset (worse with withdrawal)   Genitourinary: Negative.    Musculoskeletal: Negative.  Negative for back pain, myalgias and neck pain.   Neurological: Negative.    Psychiatric/Behavioral: Positive for dysphoric mood. Negative for suicidal ideas (no homicidal ideation as well). The patient is nervous/anxious.    Otherwise no other acute findings.      A complete review of systems was performed with pertinent positives and negatives noted in the HPI, and all other systems negative.     Physical Exam   BP: (!) 145/103  Pulse: 77  Temp: 98.3  F (36.8  C)  Resp: 16  Height: 177.8 cm (5' 10\")  Weight: 83.9 kg (185 lb)  SpO2: 98 %   Abdomen does "      CONSTITUTIONAL: Well-developed and well-nourished. Awake and alert. Non-toxic appearance. No acute distress. Seems slightly intoxicated but otherwise seems to be answering all questions appropriately. Normal alertness.  HENT:   - Head: Normocephalic and atraumatic.   - Ears: External ear grossly normal.   - Nose: Nose normal. No rhinorrhea. No epistaxis.   - Mouth/Throat: MMM  EYES: Conjunctivae and lids are normal. No scleral icterus.   NECK: Normal range of motion and phonation normal. Neck supple.  No tracheal deviation, no stridor. No edema or erythema noted.  CARDIOVASCULAR: Normal rate, regular rhythm and no appreciable abnormal heart sounds.  PULMONARY/CHEST: Normal work of breathing. No accessory muscle usage or stridor. No respiratory distress.  No appreciable abnormal breath sounds.  ABDOMEN: Soft, non-distended. No tenderness. No rigidity, rebound or guarding.  No palpable masses or abnormal pulsatility appreciated. No particular abdominal discomfort to palpation, no peritoneal findings appreciated.  MUSCULOSKELETAL: Extremities warm and seemingly well perfused. No edema or calf tenderness.  NEUROLOGIC: Awake, alert. Not disoriented. No seizure activity. GCS 15. No focal abnormalities appreciated.  SKIN: Skin is warm and dry. No rash noted. No diaphoresis. No pallor.   PSYCHIATRIC: Normal mood and affect. Speech and behavior normal. Thought processes linear. Cognition and memory are normal. No SI/HI reported.        Assessments & Plan (with Medical Decision Making)      IMPRESSION: Tate Crocker is a 37 year old male w/ PMH notable for alcohol use disorder who presents seeking alcohol detox.  Clinically, patient appears nontoxic, NAD.  Does have some findings that would probably be consistent with his reported alcohol intoxication, but normal mentation, no focal neuro abnormalities.  No other acute findings on exam.      Seems to be consistent with EtOH intoxication with potential for withdrawal  based on his history.  No clear medical findings on presentation with history or on exam.  He did have a mild degree of GI upset consistent with previous withdrawal and so in addition to the MSSA protocol also ordered a GI cocktail     No other clear abnormalities on exam that would need immediate emergent medical work-up.  Denying any SI, HI or other acute mental health crisis.     PLAN: Screening laboratory studies as per request of the detox team, MSSA protocol, GI cocktail  - Risks/benefits of pursuing imaging review and accepted.      RESULTS: Pending at shift change     INTERVENTIONS:   - MSSA protocol with Valium     RE-EVALUATION:  - Pt otherwise continues to do well here in the ED, no acute issues or apparent concerning changes in vitals or clinical appearance.     DISCUSSIONS:  - w/ BH Intake: They are holding bed, just want CBC, CMP, UDS and screening COVID   - w/ Patient: I have reviewed tentative plan. They are in agreement.      DISPOSITION/PLANNING:  SIGNOUT:  - Patient signed out to overnight EM Physician.  - Impression at shift change: alcohol intoxication with history of withdrawal  - Pending at shift change:  laboratory studies, clinical reassessment  - Tentative plan / Recommendations:  Follow-up pending studies, contact detox intake if able to be medically cleared, medical admission if not     ______________________________________________________________________     This part of the document was transcribed by Becki Briggs, Medical Scribe.       SHAHRZAD OBREGON MD     12/14/2022   [facility] MENTAL HE[facility] & ADDI[facility]       Shahrzad Obregon MD  12/16/22 1947       Shahrzad Obregon MD  12/17/22 [id number]

## 2022-12-16 NOTE — PROGRESS NOTES
"[facility], [place]   Psychiatric Progress Note        Interim history   This is a 37 year old male with  Alcohol use disorder severe  Alcohol withdrawal severe  Nicotine abuse.Pt seen in rounds.   The patient's care was discussed with the treatment team during the daily team meeting and/or staff's chart notes were reviewed.  Staff report patient has been visible in the milieu,  no acute eventsovernight.     Patient's mood is better  Improving energy appetite  Poor sleep  Improving motivation interest   Denied suicidal/homicidal ideation/plan intent.  Denied any psychosis  No prior suicde attempts  No access to gun  Pt is in alcohol withdrawal still being monitered every 4 hrs for it,   Pt mssa score are monitered  Tolerating meds and has no side effects.              Medications:     Current Facility-Administered Medications   Medication     acetaminophen (TYLENOL) tablet 650 mg     alum & mag hydroxide-simethicone (MAALOX) suspension 30 mL     atenolol (TENORMIN) tablet 50 mg     diazepam (VALIUM) tablet 5-20 mg     folic acid (FOLVITE) tablet 1 mg     hydrOXYzine (ATARAX) tablet 25 mg     loperamide (IMODIUM) capsule 2 mg     multivitamin w/minerals (THERA-VIT-M) tablet 1 tablet     nicotine (NICODERM CQ) 14 MG/24HR 24 hr patch 1 patch     nicotine (NICORETTE) gum 2 mg     nicotine Patch in Place     ondansetron (ZOFRAN ODT) ODT tab 4 mg     senna-docusate (SENOKOT-S/PERICOLACE) 8.6-50 MG per tablet 1 tablet     thiamine (B-1) tablet 100 mg     traZODone (DESYREL) tablet 50 mg             Allergies:     Allergies   Allergen Reactions     Metal [Staples]      Rash             Psychiatric Examination:   Blood pressure 124/88, pulse 75, temperature 97.3  F (36.3  C), temperature source Temporal, resp. rate 16, height 1.778 m (5' 10\"), weight 83.9 kg (185 lb), SpO2 98 %.  Weight is 185 lbs 0 oz  Body mass index is 26.54 kg/m .    Appearance:  awake, alert and adequately groomed  Attitude: "  cooperative  Eye Contact:  good  Mood:  better  Affect:  appropriate and in normal range and mood congruent  Speech:  clear, coherent rate /rhythm are good  Psychomotor Behavior:  no evidence of tardive dyskinesia, dystonia, or tics and intact station, gait and muscle tone  Throught Process:  logical  Associations:  no loose associations  Thought Content:  no evidence of suicidal ideation or homicidal ideation, no evidence of psychotic thought, no auditory hallucinations present and no visual hallucinations present  Insight:  limited  Judgement:  limited  Oriented to:  time, person, and place  Attention Span and Concentration:  intact  Recent and Remote Memory:  intact  Language fund of knowledge are adequate         Labs:     Recent Results (from the past 24 hour(s))   GGT    Collection Time: 12/16/22  7:48 AM   Result Value Ref Range     (H) 0 - 75 U/L   Lipid panel    Collection Time: 12/16/22  7:48 AM   Result Value Ref Range    Cholesterol 193 <200 mg/dL    Triglycerides 127 <150 mg/dL    Direct Measure HDL 78 >=40 mg/dL    LDL Cholesterol Calculated 90 <=100 mg/dL    Non HDL Cholesterol 115 <130 mg/dL   TSH with free T4 reflex and/or T3 as indicated    Collection Time: 12/16/22  7:48 AM   Result Value Ref Range    TSH 2.94 0.40 - 4.00 mU/L   Folate    Collection Time: 12/16/22  7:48 AM   Result Value Ref Range    Folic Acid 13.2 4.6 - 34.8 ng/mL         DX  Alcohol use disorder severe  Alcohol withdrawal severe  Nicotine abuse     PLAN   Alcohol intoxication/withdrawal, presently is on MSSA protocol with Valium. Continue the same MSSA protocol as ordered. Continue thiamine 100 mg p.o. daily, M.V.I. one p.o. daily and folate 1 mg p.o. Daily  Will continue mssa protocal to detox off alcohol on valium,  Pt is c/o of termor , agitation poor sleep and poor appetite, he has sweats, feels shakey  On mssa client scored scored4 today and  needed needed0  mg po as of yet , total dose since admission was 55 mg of  Valium    MSSA    Eating Disturbances: ate and enjoyed all of it or not applicable  Tremor: 1 - not visibly apparent but can be felt by the examiner placing his fingertip slightly against the patient's fingertips  Sleep Disturbance: slept through the night or not applicable  Clouding of Sensorium: no evidence  Hallucinations: 0 - none  Quality of Contact: 0 - awareness of examiner and people around him/her  Agitation: 0 - normal activity  Paroxysmal Sweats: 1 - barely perceptible sweating  Temperature: 99.5 or below  Pulse: 1 - 70 to 79  Total MSSA Score: 4  Patient wants to take naltrexone  If her enzymes are elevated  We will repeat them if they are trending down we can order naltrexone  Laboratory/Imaging: reviewed with patient   Consults: internal medicine consult reviewed  Patient will be treated in therapeutic milieu with appropriate individual and group therapies as described.  PDMP CHECKED     Supportive psychotheraoy provided, pastor talked about recovery enviroment, relapse prevention, triggers to use.  Discussed with patient many issues of addiction,triggers, relapse, and establishing a solid recovery program.  Asked pt to be med complinat   Medical diagnoses to be addressed this admission:    Plan:     Assessment & Recommendations  Tate Crocker is a 37 year old man with a history of alcohol use disorder who is admitted to station 3A after presenting to ED for evaluation of alcohol detox. Admitted for further psychiatric monitoring and management.       Alcohol Use Disorder  Acute Alcohol Withdrawal  PTA, patient was consuming ~1L vodka daily. Last drink was just prior to ED arrival. Withdrawal symptoms include shakiness, anxiety, nausea, fatigue, sweatiness. No history of withdrawal seizures, no seizure-like activity recently. No history of DT.    -Continue MSSA protocol  -Management per psychiatry team  -Agree with Multivitamin, Folate, and Thiamine     Transaminitis     on admission.  Most likely d/t alcohol consumption as above, Tbili and Alk Phos WNL, so less suggestive of obstructive etiology. Overall, transaminases appear better than his detox visit in 08/2022 and much better than 09/2021. On exam, no RUQ tenderness. Hemodynamically stable, no further monitored required while inpatient.  - If he develops worsening abd pain, unable to tolerate PO intake, becomes hemodynamically unstable, notify Medicine  - Follow-up with PCP on discharge w/ repeat CMP to ensure resolution              - He presently does not have a PCP, so referral placed to establish care  - Encouraged alcohol cessation     L Lateral Chest Wall Pain  Pt notes that he noticed today some pain at the L lateral chest wall, inferiorly at the costal margin. He is unsure what precipitated the pain as he has no recollection of inciting injury or other trauma to the area. On exam, no bruise, erythema, open skin, or swelling. Area is unremarkable, and no signs of flail chest or s/sx of floating ribs. Pain is unaffected by breathing, and is quite tender to palpation which is reassuring that this is likely more of a musculoskeletal issue vs cardiopulmonary etiology.   - APAP and Ibuprofen PRN  - Heat and cold packs for pain     Medicine will sign off, please page with any additional concerns.        Legal Status: voluntary    Safety Assessment:   Checks:  15 min  Precautions: withdrawal precautions  Pt has not required locked seclusion or restraints in the past 24 hours to maintain safety, please refer to RN documentation for further details.  Discussed with patient many issues of addiction,triggers, relapse, and establishing a solid recovery program.  Able to give informed consent:  YES   Discussed Risks/Benefits/Side Effects/Alternatives: YES    After discussion of the indications, risks, benefits, alternatives and consequences of no treatment, the patient elects to complete detox and do treatment

## 2022-12-16 NOTE — PLAN OF CARE
Problem: Substance Withdrawal  Goal: Substance Withdrawal  Description: Signs and symptoms of listed problems will be absent or manageable.  Outcome: Progressing   Goal Outcome Evaluation:       Pt.continues to be monitored for acute alcohol withdrawal using MSSA. He scored 7 & 9 during assessment. Administered 10mg prn valium. Pt.continues to progressively improve toward detox. He endorsed symptoms were minimal; appetite improving; slept better the previous night; and was out in the lounge watching TV and socializing with peers.  Denied SI/SIB/hallucinations. No medication adverse side effects observed or reported. Will continue to monitor.

## 2022-12-16 NOTE — PROGRESS NOTES
"Met with patient to discuss discharge and aftercare planning. Patient stated that he would like to discharge to home tomorrow, and that he would like to engage in some kind of outpatient treatment programming to address his alcohol relapse that he can do \"maybe every other evening.\" He reported that he works from home, that he is self-employed, and that he could also do mornings, if that schedule was what is available. Patient expressed concern about affording treatment, as he does not have health insurance. Patient stated that he would prefer a [facility]-based program if possible.     Searched [email or web address] and updated [facility] with new referral information based on patient's requests. Provided written information to patient this evening for him to begin planning. Advised that patient can make calls and leave messages, even if programs are not all programs are answering calls this evening or over the weekend.     Placed referral for primary care clinic in AVS and provided contact info to patient to call and schedule hospital discharge follow up appointment.     PLAN: Patient to follow up with assigned RN.     Johanna Mendoza M.Ed., LPCC, LADC  Licensed Mental Health Professional  Triage & Transition Services - Mental Health and Addiction Service Line    [facility] [place] workstation [phone]    "

## 2022-12-16 NOTE — PROGRESS NOTES
Patient sleeping in room. Paperwork in preparation for a CA not yet turned in, though Shana Bowman, DANIA, LADC, met with patient yesterday to introduce CM services and encourage patient to work on CA paperwork. Information on [facility] placed in AVS, as he could complete a Direct Access comprehensive assessment there if he completes detox and discharges over the weekend. Also placed additional Direct Access resources and contact information for [facility] Access Line in AVS.     PLAN: Attempt again to meet with patient this afternoon before end of shift to discuss discharge and aftercare planning.     Johanna Mendoza M.Ed., LPCC, LADC  Licensed Mental Health Professional  Triage & Transition Services - Mental Health and Addiction Service Line    [facility]-[address] [place] [address] workstation [phone]

## 2022-12-16 NOTE — DISCHARGE INSTRUCTIONS
Behavioral Discharge Planning and Instructions  THANK YOU FOR CHOOSING THE [facility]  3A  [phone]    Summary: You were admitted to Station 3A on 12/14/22 for detoxification from alcohol.  A medical exam was performed that included lab work. You have met with a  and opted to complete an assessment from home and enroll in an intensive outpatient treatment program.  Please take care and make your recovery a priority, Tate!    Recommendation:  If you need more support to maintain sobriety please contact Behavioral Dail[facility]otion at [phone] to schedule a substance use disorder assessment and follow the recommendations of that assessment.     The Direct Access model connects people without health insurance with funding for treatment (income limits apply). Call [facility] Addiction and Recovery Services Unit at [phone] for more information and assistance.      Local treatment providers you can contact directly for assessment, funding, and services are:   [facility]  [address] [place] [address] [phone]  [email or web address]    [facility] (multiple locations)  [address] [place], [place], [address] [phone]  www.[email or web address]    [facility] (multiple locations)  [address] [address]  Phone: [phone]  Fax: [phone]  [email or web address]    [facility]  [address], [address]  [place] [address] [phone]    [facility]  [address]  [facility] [address]  [phone]  lifechoicesch.[employer]    You may also call [facility] [phone] to ask about substance use and mental health recovery resources, case management, and other supports.     Your [facility] has a crisis team available 24/7: [facility] COPE: [phone]    Main Diagnosis: Per Dr. Tyson  MD Naun  303.90 (F10.20) Alcohol Use Disorder Severe    Major Treatments, Procedures and Findings:  You were detoxed from alcohol with the Modified Selective Severity Protocol using Valium. You have met with a  to develop a treatment plan for discharge.  You have had labs drawn and a copy of those labs will be sent home with you.  Please bring your lab results with to your follow up doctor appointment.    Symptoms to Report:  If you experience more anxiety, confusion, sleeplessness, deep sadness or thoughts of suicide, notify your treatment team or notify your primary care physician. IF ANY OF THE SYMPTOMS YOU ARE EXPERIENCING ARE A MEDICAL EMERGENCY CALL 911 IMMEDIATELY.     If you or someone you know is struggling or in crisis, help is available.  Call or text InTown or chat  at [email or web address].SocMetrics    Lifestyle Adjustment: Adjust your lifestyle to get enough sleep, relaxation, exercise and  good nutrition. Continue to develop healthy coping skills to decrease stress and promote a sober living environment. Do not use alcohol, illegal drugs or addictive medications other than what is currently prescribed. AA, NA, and  Sponsor are excellent resources for support.     Primary Care Follow-up Appointment Date/Time:       [facility]  [address] [place] [address]  [phone]      Disposition: home      Facts about COVID19 at www.cdc.gov/COVID19 and www.[place].gov/covid19    Keeping hands clean is one of the most important steps we can take to avoid getting sick and spreading germs to others.  Please wash your hands frequently and lather with soap for at least 20 seconds!        Resources     Resources for on line recovery meetings:  AA meetings can be found online; search for them at: http://aa-intergroup.org/directory.php  AA meetings via ZOOM for [place] area can be found online at: https://a[place].org/find-a-meeting/holiday-closings/  NA meetings via ZOOM for  "[place] area can be found online at: https://sites.TEEspy.com/view/mnregionofnarcoticsanonymous/home?authuser=2    Www.RankingHero  has online resources for meeting and recovery care including Podcast \"Let's Talk:Addiction & Recovery Podcasts    Www.mnrecovery.org     DISCHARGE RESOURCES:  -SMART Recovery - self management for addiction recovery:  www.smartrecovery.org    -Pathways ~ A Health Crisis Resource & Support Center: 489.457.3877.  -[facility] [phone]   -[facility] Behavioral Intake 488-289-4574 or [phone].  -Suicide Awareness Voices of Education (SAVE) (www.save.org): 093-648-HILO (6575)  -National Suicide Prevention Line (www.mentalhealthmn.org): 596-889-HJGB (7777)  -National [place] on Mental Illness (www.mn.miguel.org): 721.122.3040 or 570-206-3277.  -Flfz2zhfe: text the word LIFE to 83355 for immediate support and crisis intervention  -Mental Health Consumer/Survivor Network of [place] (www.mhcsn.net): 684.867.6008 or 124-005-8850  -Mental Health Association of [place] (www.mentalhealth.org): 410.978.5747 or 411-782-8774     -Substance Abuse and Mental Health Services (www.samhsa.gov)  -Harm Reduction Coalition (www. Harmreduction.org)  -www.prescribetoprevent.org or http://prescribetoprevent.org/video  -Poison control 9-665-884-1590   **[place] Opioid Prevention Coalition: www.opioidcoalition.org    Sober Support Group Information:  AA/NA & Sponsor/Support  -Alcoholics Anonymous (www.alcoholics-anonymous.org): for local information 24 hours/day  -AA Intergroup service office in [place] (http://www.aastpaul.org/) [phone]  -AA Intergroup service office in [facility]: [phone]. (http://www.a[place].org/)  -Narcotics Anonymous (www.naminn[place].org) (363) 799-4557   **Sober Fun Activities: www.soberHealogicaactivities.KBLE.Roundrate/[facility]//[facility] Recovery Connection ([facility])  [facility] connects people seeking recovery to resources that help foster and " sustain long-term recovery.  Whether you are seeking resources for treatment, transportation, housing, job training, education, health care or other pathways to recovery, [facility] [place] is a great place to start.    Phone: (484) [phone].  www.minnesotaKineta.[employer] (Great listing of all types of recovery and non-recovery related resources)      General Medication Instructions:   See your medication sheet(s) for instructions.   Take all medicines as directed.  Make no changes unless your doctor suggests them.   Go to all your doctor visits.  Be sure to have all your required lab tests. This way, your medicines can be refilled on time.  Do not use any drugs not prescribed by your provider.  AA/NA and Sponsors are excellent resources for support  Avoid alcohol.    Any follow up concerns:  Nursing questions call the [address] [phone]  Medical Record call [phone]  Outpatient Behavioral Intake call [phone]  LP+ Wait List/Bed Availability call [phone]    The entire treatment team has appreciated the opportunity to work with you Tate.  We wish you the best in the future and with your lifelong recovery goals. Please bring this discharge folder with you to all follow up appointments.  It contains your lab results, diagnosis, medication list and discharge recommendations.    THANK YOU FOR CHOOSING THE [facility]     Substance Use Disorder Direct Access Resources    It is recommended that you abstain from all mood altering chemicals. Please contact the sober support hotline (345-148-0012) as needed; phones are answered 24 hours a day, 7 days a week.    To access substance use treatment you must have a comprehensive assessment completed to begin any treatment program.     If uninsured, please contact your county of residence for eligibility screen to substance use disorder evaluation and treatment:    [place] - [phone]   [place] - [phone]   [facility]  [phone]   [place]  [phone]   [facility] [phone]   [place]  [phone]   [facility] [phone]   [place] - [phone]     If you have private insurance, call the customer service number on the back of your insurance card to find an in-network substance abuse use disorder assessment. The ideal provider will be a treatment facility, licensed in the state of [place].     Community EMMY Evaluations: Clients may call their county for a full list of providers - Availability and services listed below are subject to change, please call the provider to confirm    [facility]  [phone]  [address] [place] [address], [address]  *Please call the above number to schedule a comprehensive assessment for determination of level of care needs. In person and virtual appointments available Mon-Fri.    [facility], [address] [place] [address], First Floor, [address],  [address] (next to the outpatient lab)    Phone: [phone]   Provides bridging services to people with Opiate Use Disorders (OUD) seeking care. This is a front door to Medication Assisted Treatments (MAT), ages 16+  Walk In hours: Monday-Friday 9:00am-3:00pm    [facility]  [phone]  Walk in Assessments: Mon-Friday 7a-1:45p  [address], [place], [address]    [facility] - [facility]  Central Access [phone]  [address] [place] [address], [address]  *by appointment only    [facility]  [phone] (phone consultation available )  Locations in: [place], [place], [facility], and [place], [place]  Chinese virtual IOP [phone] or visit López.org/SANTOS   Also offers LGBTQ programming     [facility] [place]  [phone]  [address]  , [address]  *Currently only offered via telehealth - call to set up an appointment    [facility]  [address] [place] [address],  [address]  [facility] Program  For more information to to make a referral call:  [phone]  Walk-in on Fridays  9-11 a.m.    [facility]  [phone]  [address] [place], [place], [address]  *available by appointments only    [facility]  [phone]  [address] [place], [place], [address]  *available by appointment only    [facility]  [phone]  [address] [place], [place], [address]  *walk in assessments available M-F starting at 7 am.    [facility]  [phone]  Locations: [facility], [facility], and [place]  *Walk in assessments availble M-F starting at 8 am -virtual only    [facility]  [phone]  [address] [place] [address]    [facility]  Virtual + Locations: [place], [place], [place], [place], [facility], [facility], [place], [place]   [phone]  *available by appointment only    [facility]  [phone]  [address]  [place], [place], [address]    [facility]  [phone]  [address] [place], [address]  *available by appointment    [facility]  [phone]  [address] [place], [place], [address]  *walk ins available M-TH from 9-3    [facility]  MAT program: [phone]  [address] [place], [place], [address]    [place]  [phone]  Same day substance use disorder assessments are available Monday - Friday, via walk-in or by appointment at the [place] location.  [address], [address], [place] [address]     [facility] - adolescent and adult SUDs services  [phone]  Offer services Monday through Friday, as well as evening hours Monday through Thursday. Normally, a first appointment will be scheduled within one week  https://www.narcisoHadapteling.com/our-services/drug-alcohol-treatment  Locations all over [place]    If you are intoxicated, you may be  required to detox at a detox facility before starting treatment. The following are detox facilities that you can self present to. All detox facilities are able to help you complete an assessment prior to discharge if you choose:    [facility]: Arrive at a [facility] for immediate medical evaluation    [facility]: [address] [place], [place], [address].         [phone]    [facility]: [address] [place], [place], [address]  [phone]     [facility] ([facility]): [address] [place], [place], [address]  [phone]     [facility]: [address] [place], [place], [address], [phone]         Ways to help cope with sobriety:    -- Take prescribed medicines as scheduled  -- Keep follow-up appointments  -- Talk to others about your concerns  -- Get regular exercise  -- Practice deep breathing skills  -- Eat a healthy diet  -- Use community resources, including hotline numbers, [facility] crisis and support meetings  -- Stay sober and avoid places/people/things associated with substance use  --Maintain a daily schedule/routine  --Get at least 7-8 hours of sleep per night  --Create a list 10--20 healthy activities that you can do that are enjoyable and do not involve substance use  --Create daily goals (approx. 1-4 goals) per day and work to achieve them throughout the day.       Free Resources:    [place] Recovery Connection ([facility])  [facility] connects people seeking recovery to resources that help foster and sustain long-term recovery. Whether you are seeking resources for treatment, transportation, housing, job training, education, health care or other pathways to recovery, [facility] is a great place to start.  Phone: 120.779.2168. www.minnesotaTerraPerks.ParaShoot (Great listing of all types of recovery and non-recovery related resources)    Alcoholics Anonymous  Phone: 2-437-ALCOHOL  Website: HTTP://WWW.AA.ORG/  AA [place] (580-657-1884 or  http://aamin[place].org)  [facility] [place] (796-781-5956 or www.aastpaul.org)     Narcotics Anonymous  Phone: 791.237.4740  Website: www.RobotsLAB.Liztic.    [facility] [address], [place], [place],  Phone: [phone]  Drop-in Hours: Monday-Friday 9-11:30 am. By appointment at other times.  Provides: [facility] is a drop-in center on the east side [facility] that provides a safe space for individuals who are homeless and have a history of chemical use. Sobriety is not a requirement but drugs and alcohol are not allowed on the property.  Services: Non-clients can access drop-in services such as Recovery and Harm Reduction Groups, referrals to case management, community activities, shower facilities, and a pool table. Individuals who are homeless and have chemical health needs may be eligible for enrollment into [facility]'s case management program. Clients and  work together to access benefits, treatment, health care, shelter, and external housing resources.

## 2022-12-16 NOTE — PROGRESS NOTES
Pt.appeared agitated and restless at the beginning of night shift. He was assessed for acute alcohol withdrawal using MSSA. He scored 8 & 4. Got 10mg valium and 2nd dose of trazodone. He slept uninterrupted through the night afterward. No safety or behavioral concerns. Will continue to monitor.

## 2022-12-17 VITALS
HEART RATE: 84 BPM | SYSTOLIC BLOOD PRESSURE: 129 MMHG | WEIGHT: 185 LBS | OXYGEN SATURATION: 97 % | RESPIRATION RATE: 16 BRPM | HEIGHT: 70 IN | TEMPERATURE: 97.3 F | BODY MASS INDEX: 26.48 KG/M2 | DIASTOLIC BLOOD PRESSURE: 93 MMHG

## 2022-12-17 PROCEDURE — 250N000013 HC RX MED GY IP 250 OP 250 PS 637: Performed by: PSYCHIATRY & NEUROLOGY

## 2022-12-17 PROCEDURE — G0177 OPPS/PHP; TRAIN & EDUC SERV: HCPCS

## 2022-12-17 PROCEDURE — 99239 HOSP IP/OBS DSCHRG MGMT >30: CPT | Performed by: PSYCHIATRY & NEUROLOGY

## 2022-12-17 RX ORDER — LANOLIN ALCOHOL/MO/W.PET/CERES
100 CREAM (GRAM) TOPICAL DAILY
Qty: 30 TABLET | Refills: 0 | Status: ON HOLD | OUTPATIENT
Start: 2022-12-18 | End: 2023-02-10

## 2022-12-17 RX ORDER — ACAMPROSATE CALCIUM 333 MG/1
666 TABLET, DELAYED RELEASE ORAL 3 TIMES DAILY
Qty: 180 TABLET | Refills: 0 | Status: ON HOLD | OUTPATIENT
Start: 2022-12-17 | End: 2023-02-10

## 2022-12-17 RX ORDER — MULTIPLE VITAMINS W/ MINERALS TAB 9MG-400MCG
1 TAB ORAL DAILY
Qty: 30 TABLET | Refills: 0 | Status: ON HOLD | OUTPATIENT
Start: 2022-12-18 | End: 2023-02-10

## 2022-12-17 RX ORDER — TRAZODONE HYDROCHLORIDE 50 MG/1
50 TABLET, FILM COATED ORAL
Qty: 30 TABLET | Refills: 0 | Status: ON HOLD | OUTPATIENT
Start: 2022-12-17 | End: 2023-02-10

## 2022-12-17 RX ORDER — HYDROXYZINE HYDROCHLORIDE 25 MG/1
25 TABLET, FILM COATED ORAL EVERY 4 HOURS PRN
Qty: 30 TABLET | Refills: 0 | Status: ON HOLD | OUTPATIENT
Start: 2022-12-17 | End: 2023-02-10

## 2022-12-17 RX ADMIN — HYDROXYZINE HYDROCHLORIDE 25 MG: 25 TABLET ORAL at 01:22

## 2022-12-17 RX ADMIN — Medication 1 TABLET: at 08:39

## 2022-12-17 RX ADMIN — FOLIC ACID 1 MG: 1 TABLET ORAL at 08:39

## 2022-12-17 RX ADMIN — ACETAMINOPHEN 650 MG: 325 TABLET, FILM COATED ORAL at 08:38

## 2022-12-17 RX ADMIN — HYDROXYZINE HYDROCHLORIDE 25 MG: 25 TABLET ORAL at 14:27

## 2022-12-17 RX ADMIN — THIAMINE HCL TAB 100 MG 100 MG: 100 TAB at 08:39

## 2022-12-17 RX ADMIN — TRAZODONE HYDROCHLORIDE 50 MG: 50 TABLET ORAL at 00:04

## 2022-12-17 RX ADMIN — HYDROXYZINE HYDROCHLORIDE 25 MG: 25 TABLET ORAL at 08:42

## 2022-12-17 RX ADMIN — NICOTINE 1 PATCH: 14 PATCH, EXTENDED RELEASE TRANSDERMAL at 08:38

## 2022-12-17 ASSESSMENT — ACTIVITIES OF DAILY LIVING (ADL)
ADLS_ACUITY_SCORE: 40
HYGIENE/GROOMING: INDEPENDENT
LAUNDRY: WITH SUPERVISION
ADLS_ACUITY_SCORE: 40
ADLS_ACUITY_SCORE: 40
ORAL_HYGIENE: INDEPENDENT
ADLS_ACUITY_SCORE: 40
DRESS: INDEPENDENT

## 2022-12-17 NOTE — PLAN OF CARE
Problem: Plan of Care - These are the overarching goals to be used throughout the patient stay.    Goal: Readiness for Transition of Care  Outcome: Met   Goal Outcome Evaluation:         Pt.discharged home after safely detoxifying from alcohol. He denied SI/SIB/HI/hallucinations. Identified and reviewed his coping skills with RN. He discharged with all his belongings and discharge medications.     Pt.requested to leave the unit after [employer] game.

## 2022-12-17 NOTE — PLAN OF CARE
Patient discharged to home via ambulatory at 1615, accompanied by staff. Patient had no additional questions regarding discharge. Belongings returned upon discharge.

## 2022-12-17 NOTE — PROGRESS NOTES
Pt.is out of detox for acute alcohol withdrawal monitoring. He appears stable and has not needed prn valium to manage symptoms in 24 hours. Will continue to monitor.

## 2022-12-17 NOTE — PLAN OF CARE
12/16/22 1811   Group Therapy Session   Group Attendance attended group session   Time Session Began 1645   Time Session Ended 1800   Total Time (minutes) 75   Total # Attendees 7   Group Type psychotherapeutic   Group Topic Covered coping skills/lifestyle management   Group Session Detail Group participated in a structured therapeutic group with a focus on insight, positive change, choices and problem solving via questions and verbal interactions.   Patient Response/Contribution cooperative with task;offered helpful suggestions to peers;listened actively;expressed readiness to alter behaviors;discussed personal experience with topic   Patient Participation Detail Pt participated actively, shared impact of their substance use on their life and choices. Shared the struggles and hardships they ve encountered; actively discussed the struggle of recovery, supported other group members.

## 2022-12-17 NOTE — DISCHARGE SUMMARY
Tate Crocker MRN# [id number]   Age: 37 year old YOB: 1985     Date of Admission:  12/14/2022  Date of Discharge:  12/17/2022  Admitting Physician:  Dilshad David MD  Discharge Physician:  Jah Magallon MD      DISCHARGE  DX     Alcohol use disorder severe    Nicotine abuse         Event Leading to Hospitalization:     See Admission note by admitting provider for patient encounter. for additional details.          Hospital Course:   PATIENT was admitted to Station 3[facility]with attending  under DR magallon, please review the detailed admit note on 12/15/22   The patient was placed under status 15 (15 minute checks) to ensure patient safety.   MSSA protocol was initiated due to the patient's history of alcohol abuse and concern for withdrawal symptoms.  CBC, BMP and utox obtained.    All outpatient medications were continued    PATIENTdid participate in groups and was visible in the milieu.     The patient's symptoms of alcohol withdrawal improved.     Patients energy motivation , sleep appetite improved.  Pt completed detox . It was un eventful.  Patient's liver enzymes trended up AST went from 1 18-1 72 ALT went from 1 14-1 21  Restarted on Campral for alcohol craving naltrexone was not considered because of elevated liver enzymes  He was advised to get them rechecked in 3 to 4 weeks    Discussed with patient medications for craving.  Spoke with patient about triggers coping skills relapse prevention.    CONSULTS DONE DURING PATIENTS HOSPITALIZATION.  Patient was seen by medicine on date12/15/22    This as per their medical consult    Assessment & Recommendations  Tate Crocker is a 37 year old man with a history of alcohol use disorder who is admitted to station 3A after presenting to ED for evaluation of alcohol detox. Admitted for further psychiatric monitoring and management.       Alcohol Use Disorder  Acute Alcohol Withdrawal  PTA, patient was consuming ~1L vodka daily. Last drink was  just prior to ED arrival. Withdrawal symptoms include shakiness, anxiety, nausea, fatigue, sweatiness. No history of withdrawal seizures, no seizure-like activity recently. No history of DT.    -Continue MSSA protocol  -Management per psychiatry team  -Agree with Multivitamin, Folate, and Thiamine     Transaminitis     on admission. Most likely d/t alcohol consumption as above, Tbili and Alk Phos WNL, so less suggestive of obstructive etiology. Overall, transaminases appear better than his detox visit in 08/2022 and much better than 09/2021. On exam, no RUQ tenderness. Hemodynamically stable, no further monitored required while inpatient.  - If he develops worsening abd pain, unable to tolerate PO intake, becomes hemodynamically unstable, notify Medicine  - Follow-up with PCP on discharge w/ repeat CMP to ensure resolution              - He presently does not have a PCP, so referral placed to establish care  - Encouraged alcohol cessation     L Lateral Chest Wall Pain  Pt notes that he noticed today some pain at the L lateral chest wall, inferiorly at the costal margin. He is unsure what precipitated the pain as he has no recollection of inciting injury or other trauma to the area. On exam, no bruise, erythema, open skin, or swelling. Area is unremarkable, and no signs of flail chest or s/sx of floating ribs. Pain is unaffected by breathing, and is quite tender to palpation which is reassuring that this is likely more of a musculoskeletal issue vs cardiopulmonary etiology.   - APAP and Ibuprofen PRN  - Heat and cold packs for pain     Medicine will sign off, please page with any additional concerns.                Pt was seen by cm  As per recommendations from cm    Patient sleeping in room. Paperwork in preparation for a CA not yet turned in, though Shana Bowman, LPCC, LADC, met with patient yesterday to introduce CM services and encourage patient to work on CA paperwork. Information on [facility] placed in  "AVS, as he could complete a Direct Access comprehensive assessment there if he completes detox and discharges over the weekend. Also placed additional Direct Access resources and contact information for [facility] Access Line in AVS.      PLAN: Attempt again to meet with patient this afternoon before end of shift to discuss discharge and aftercare planning.      Johanna Mendoza M.Ed., [facility], [facility]  Licensed Mental Health Professional  Triage & Transition Services - Mental Health and Addiction Service Line  Met with patient to discuss discharge and aftercare planning. Patient stated that he would like to discharge to home tomorrow, and that he would like to engage in some kind of outpatient treatment programming to address his alcohol relapse that he can do \"maybe every other evening.\" He reported that he works from home, that he is self-employed, and that he could also do mornings, if that schedule was what is available. Patient expressed concern about affording treatment, as he does not have health insurance. Patient stated that he would prefer a [facility]-based program if possible.      Searched [email or web address] and updated [facility] with new referral information based on patient's requests. Provided written information to patient this evening for him to begin planning. Advised that patient can make calls and leave messages, even if programs are not all programs are answering calls this evening or over the weekend.      Placed referral for primary care clinic in AVS and provided contact info to patient to call and schedule hospital discharge follow up appointment.               Labs:reviewed with patient       Recent Results (from the past 48 hour(s))   GGT    Collection Time: 12/16/22  7:48 AM   Result Value Ref Range     (H) 0 - 75 U/L   Lipid panel    Collection Time: 12/16/22  7:48 AM   Result Value Ref Range    Cholesterol 193 <200 mg/dL    Triglycerides 127 <150 mg/dL    Direct Measure HDL 78 >=40 mg/dL    LDL " Cholesterol Calculated 90 <=100 mg/dL    Non HDL Cholesterol 115 <130 mg/dL   TSH with free T4 reflex and/or T3 as indicated    Collection Time: 12/16/22  7:48 AM   Result Value Ref Range    TSH 2.94 0.40 - 4.00 mU/L   Vitamin B12    Collection Time: 12/16/22  7:48 AM   Result Value Ref Range    Vitamin B12 532 232 - 1,245 pg/mL   Folate    Collection Time: 12/16/22  7:48 AM   Result Value Ref Range    Folic Acid 13.2 4.6 - 34.8 ng/mL   Hepatic panel    Collection Time: 12/16/22  1:03 PM   Result Value Ref Range    Bilirubin Total 0.8 0.2 - 1.3 mg/dL    Bilirubin Direct 0.1 0.0 - 0.2 mg/dL    Protein Total 7.5 6.8 - 8.8 g/dL    Albumin 3.3 (L) 3.4 - 5.0 g/dL    Alkaline Phosphatase 92 40 - 150 U/L     (H) 0 - 45 U/L     (H) 0 - 70 U/L   Extra Purple Top Tube    Collection Time: 12/16/22  1:03 PM   Result Value Ref Range    Hold Specimen [facility]          Recent Results (from the past 240 hour(s))   Drug abuse screen 1 urine (ED)    Collection Time: 12/14/22 11:15 PM   Result Value Ref Range    Amphetamines Urine Screen Negative Screen Negative    Barbiturates Urine Screen Negative Screen Negative    Benzodiazepines Urine Screen Negative Screen Negative    Cannabinoids Urine Screen Negative Screen Negative    Cocaine Urine Screen Negative Screen Negative    Opiates Urine Screen Negative Screen Negative   Alcohol breath test POCT    Collection Time: 12/14/22 11:19 PM   Result Value Ref Range    Alcohol Breath Test 0.257 (A) 0.00 - 0.01   Comprehensive metabolic panel    Collection Time: 12/14/22 11:40 PM   Result Value Ref Range    Sodium 141 133 - 144 mmol/L    Potassium 4.1 3.4 - 5.3 mmol/L    Chloride 110 (H) 94 - 109 mmol/L    Carbon Dioxide (CO2) 26 20 - 32 mmol/L    Anion Gap 5 3 - 14 mmol/L    Urea Nitrogen 7 7 - 30 mg/dL    Creatinine 0.82 0.66 - 1.25 mg/dL    Calcium 8.8 8.5 - 10.1 mg/dL    Glucose 91 70 - 99 mg/dL    Alkaline Phosphatase 86 40 - 150 U/L     (H) 0 - 45 U/L     (H) 0 -  70 U/L    Protein Total 7.8 6.8 - 8.8 g/dL    Albumin 3.7 3.4 - 5.0 g/dL    Bilirubin Total 0.3 0.2 - 1.3 mg/dL    GFR Estimate >90 >60 mL/min/1.73m2   CBC with platelets and differential    Collection Time: 12/14/22 11:40 PM   Result Value Ref Range    WBC Count 4.9 4.0 - 11.0 10e3/uL    RBC Count 4.42 4.40 - 5.90 10e6/uL    Hemoglobin 14.6 13.3 - 17.7 g/dL    Hematocrit 41.6 40.0 - 53.0 %    MCV 94 78 - 100 fL    MCH 33.0 26.5 - 33.0 pg    MCHC 35.1 31.5 - 36.5 g/dL    RDW 13.3 10.0 - 15.0 %    Platelet Count 206 150 - 450 10e3/uL    % Neutrophils 50 %    % Lymphocytes 36 %    % Monocytes 10 %    % Eosinophils 3 %    % Basophils 1 %    % Immature Granulocytes 0 %    NRBCs per 100 WBC 0 <1 /100    Absolute Neutrophils 2.5 1.6 - 8.3 10e3/uL    Absolute Lymphocytes 1.8 0.8 - 5.3 10e3/uL    Absolute Monocytes 0.5 0.0 - 1.3 10e3/uL    Absolute Eosinophils 0.1 0.0 - 0.7 10e3/uL    Absolute Basophils 0.0 0.0 - 0.2 10e3/uL    Absolute Immature Granulocytes 0.0 <=0.4 10e3/uL    Absolute NRBCs 0.0 10e3/uL   Lipase    Collection Time: 12/14/22 11:40 PM   Result Value Ref Range    Lipase 118 73 - 393 U/L   Asymptomatic COVID-19 Virus (Coronavirus) by PCR Nasopharyngeal    Collection Time: 12/14/22 11:41 PM    Specimen: Nasopharyngeal; Swab   Result Value Ref Range    SARS CoV2 PCR Negative Negative   GGT    Collection Time: 12/16/22  7:48 AM   Result Value Ref Range     (H) 0 - 75 U/L   Lipid panel    Collection Time: 12/16/22  7:48 AM   Result Value Ref Range    Cholesterol 193 <200 mg/dL    Triglycerides 127 <150 mg/dL    Direct Measure HDL 78 >=40 mg/dL    LDL Cholesterol Calculated 90 <=100 mg/dL    Non HDL Cholesterol 115 <130 mg/dL   TSH with free T4 reflex and/or T3 as indicated    Collection Time: 12/16/22  7:48 AM   Result Value Ref Range    TSH 2.94 0.40 - 4.00 mU/L   Vitamin B12    Collection Time: 12/16/22  7:48 AM   Result Value Ref Range    Vitamin B12 532 232 - 1,245 pg/mL   Folate    Collection Time:  12/16/22  7:48 AM   Result Value Ref Range    Folic Acid 13.2 4.6 - 34.8 ng/mL   Hepatic panel    Collection Time: 12/16/22  1:03 PM   Result Value Ref Range    Bilirubin Total 0.8 0.2 - 1.3 mg/dL    Bilirubin Direct 0.1 0.0 - 0.2 mg/dL    Protein Total 7.5 6.8 - 8.8 g/dL    Albumin 3.3 (L) 3.4 - 5.0 g/dL    Alkaline Phosphatase 92 40 - 150 U/L     (H) 0 - 45 U/L     (H) 0 - 70 U/L   Extra Purple Top Tube    Collection Time: 12/16/22  1:03 PM   Result Value Ref Range    Hold Specimen [facility]             Because this patient meets criteria for an Alcohol Use Disorder, I performed the following brief intervention on the date of this note:              1) Expressed concern that the patient is drinking at unhealthy levels known to increase their risk of alcohol related problems              2) Gave feedback linking alcohol use and health, including personalized feedback explaining how alcohol use can interact with their medical and/or psychiatric problems, and with prescribed medications.              3) Advised patient to abstain.    PT counseled on nicotine cessation and nicotine replacement provided    Discussed with patient many issues of addiction,triggers, relapse, and establishing a solid recovery program.    DISCHARGE MENTAL STATUS EXAMINATION:  The patient is alert, oriented x3.  Good fund of knowledge.  Good use of language.  Recent and remote memory, language, fund of knowledge are all adequate.  Euthymic mood congruent affect  Speech normal rate/rhythm linear tp no loose asso,The patient does not have any active suicidal or homicidal ideation.  Does not have any auditory or visual hallucination.  Fair insight/judgment At this time, the patient was stable to be discharged.        Pt was not determined to not be a danger to himself or others. At the current time of discharge, the patient does not meet criteria for involuntary hospitalization. On the day of discharge, the patient reports that they do  "not have suicidal or homicidal ideation and would never hurt themselves or others. Steps taken to minimize risk include: assessing patient s behavior and thought process daily during hospital stay, discharging patient with adequate plan for follow up for mental and physical health and discussing safety plan of returning to the hospital should the patient ever have thoughts of harming themselves or others. Therefore, based on all available evidence including the factors cited above, the patient does not appear to be at imminent risk for self-harm, and is appropriate for outpatient level of care.     Educated about side effects/risk vs benefits /alternative including non treatment.Pt consented to be on medication.     .Total time spent on discharge summary more than 35 min  More than  20 min  planning, coordination of care, medication reconciliation and performance of physical exam on day of discharge.Care was coordinated with unit RN and unit therapist         Review of your medicines      You have not been prescribed any medications.          Disposition: Patient going home     Facts about COVID19 at www.cdc.gov/COVID19 and www.[place].gov/covid19     Keeping hands clean is one of the most important steps we can take to avoid getting sick and spreading germs to others.  Please wash your hands frequently and lather with soap for at least 20 seconds!     Medical Follow-Up: Follow-up with primary care in 3 to 4 weeks  [facility]  [address] [place] [address] [phone]     Treatment Follow-Up:You have met with a  and opted to complete an assessment from home and enroll in an intensive outpatient treatment program.  .        \"Much or all of the text in this note was generated through the use of Dragon Dictate voice to text software. Errors in spelling or words which appear to be out of contact are unintentional, may be present due having escaped editing\"     "

## 2022-12-17 NOTE — PLAN OF CARE
"Occupational Therapy Group Note:       12/17/22 1618   Group Therapy Session   Group Attendance attended group session   Time Session Began 1315   Time Session Ended 1410   Total Time (minutes) 55   Total # Attendees 5   Group Type recreation   Group Topic Covered leisure exploration/use of leisure time   Group Session Detail OT Leisure Group   Patient Response/Contribution confronted peers appropriately;cooperative with task;listened actively;organized;offered helpful suggestions to peers   Patient Participation Detail Patient engaged in leisure based occupational therapy group in order to: promote social skills, decrease isolation, address new learning and exercise cognitive skills (initiation, organization, sequencing, attention, and follow-through of task), and reduce stress and foster relaxation. Patient was able to listen and follow directions of OT in regards to therapeutic activity this date. Patient was able to initiate task, sequence items, and follow-through with task. Patient, at times, would ask peers for help with remembering specific names or historic events. Patient appeared to have a difficult time identifying healthy recreational activities when at home. Patient stated that watching today's [employer] game made him feel anxious and want a drink. Education provided to walk away from the football game if it causes \"triggers\" or increased stress. Patient was calm and cooperative throughout today's group.        "

## 2022-12-17 NOTE — PLAN OF CARE
Goal Outcome Evaluation:    Patient slept for a total of 6 hours. Patient endorsed having trouble sleeping in the beginning of the shift. Trazodone 50mg was administered. About 1 hour later, patient reported feeling anxious and restless; vistaril 25mg was administered.    MSSA was 6; patient denied having withdrawal symptoms.    We'll continue to monitor.

## 2022-12-17 NOTE — PLAN OF CARE
"Patient visible in milieu, social with peers. Attended offered group. Patient affect neutral, reports mood is anxious. Denies SI, SIB, HI, or hallucinations.  Continues to be monitored for alcohol withdrawal. MSSA scores 5 and 4.   Appetite good and drinking enough fluids. VSS except elevated BP times one. Reported headache pain 6/10, was given prn Tylenol 650 mg times one. Patient was also given prn hydroxyzine 25 mg times 2 for anxiety. Was given prn trazodone 50 mg times one for sleep.  Patient reporting he would like to discharge tomorrow, if able. Was given some resources for outpatient chemical dependency programs by . Patient not interested in residential CD treatment at this time.   BP (!) 142/102 (BP Location: Left arm, Patient Position: Sitting, Cuff Size: Adult Regular)   Pulse 97   Temp 98.5  F (36.9  C) (Oral)   Resp 17   Ht 1.778 m (5' 10\")   Wt 83.9 kg (185 lb)   SpO2 96%   BMI 26.54 kg/m     Problem: Substance Withdrawal  Goal: Substance Withdrawal  Description: Signs and symptoms of listed problems will be absent or manageable.  Outcome: Progressing     Problem: Suicidal Behavior  Goal: Suicidal Behavior is Absent or Managed  Outcome: Progressing     Problem: Alcohol Withdrawal  Goal: Alcohol Withdrawal Symptom Control  Outcome: Progressing   Goal Outcome Evaluation:                        "

## 2023-02-09 ENCOUNTER — HOSPITAL ENCOUNTER (OUTPATIENT)
Facility: CLINIC | Age: 38
Setting detail: OBSERVATION
Discharge: HOME | End: 2023-02-12
Attending: EMERGENCY MEDICINE | Admitting: STUDENT IN AN ORGANIZED HEALTH CARE EDUCATION/TRAINING PROGRAM

## 2023-02-09 DIAGNOSIS — F10.930 ALCOHOL WITHDRAWAL SYNDROME WITHOUT COMPLICATION (H): Primary | ICD-10-CM

## 2023-02-09 DIAGNOSIS — Z20.822 LAB TEST NEGATIVE FOR COVID-19 VIRUS: ICD-10-CM

## 2023-02-09 DIAGNOSIS — F10.939 ALCOHOL WITHDRAWAL SYNDROME WITH COMPLICATION (H): ICD-10-CM

## 2023-02-09 LAB
ALCOHOL BREATH TEST: 0.3 (ref 0–0.01)
AMPHETAMINES UR QL SCN: NORMAL
BARBITURATES UR QL SCN: NORMAL
BENZODIAZ UR QL SCN: NORMAL
BZE UR QL SCN: NORMAL
CANNABINOIDS UR QL SCN: NORMAL
OPIATES UR QL SCN: NORMAL
SARS-COV-2 RNA RESP QL NAA+PROBE: NEGATIVE

## 2023-02-09 PROCEDURE — 93005 ELECTROCARDIOGRAM TRACING: CPT | Performed by: EMERGENCY MEDICINE

## 2023-02-09 PROCEDURE — U0005 INFEC AGEN DETEC AMPLI PROBE: HCPCS | Performed by: FAMILY MEDICINE

## 2023-02-09 PROCEDURE — 80307 DRUG TEST PRSMV CHEM ANLYZR: CPT | Performed by: EMERGENCY MEDICINE

## 2023-02-09 PROCEDURE — C9803 HOPD COVID-19 SPEC COLLECT: HCPCS | Performed by: EMERGENCY MEDICINE

## 2023-02-09 PROCEDURE — 250N000013 HC RX MED GY IP 250 OP 250 PS 637: Performed by: EMERGENCY MEDICINE

## 2023-02-09 PROCEDURE — 82075 ASSAY OF BREATH ETHANOL: CPT | Performed by: EMERGENCY MEDICINE

## 2023-02-09 PROCEDURE — 99285 EMERGENCY DEPT VISIT HI MDM: CPT | Mod: 25 | Performed by: EMERGENCY MEDICINE

## 2023-02-09 PROCEDURE — 93010 ELECTROCARDIOGRAM REPORT: CPT | Performed by: EMERGENCY MEDICINE

## 2023-02-09 PROCEDURE — U0005 INFEC AGEN DETEC AMPLI PROBE: HCPCS | Performed by: EMERGENCY MEDICINE

## 2023-02-09 RX ORDER — OXYCODONE HYDROCHLORIDE 5 MG/1
5 TABLET ORAL ONCE
Status: COMPLETED | OUTPATIENT
Start: 2023-02-09 | End: 2023-02-09

## 2023-02-09 RX ORDER — ACETAMINOPHEN 325 MG/1
650 TABLET ORAL ONCE
Status: DISCONTINUED | OUTPATIENT
Start: 2023-02-09 | End: 2023-02-09

## 2023-02-09 RX ORDER — ONDANSETRON 4 MG/1
4 TABLET, ORALLY DISINTEGRATING ORAL ONCE
Status: DISCONTINUED | OUTPATIENT
Start: 2023-02-10 | End: 2023-02-10

## 2023-02-09 RX ADMIN — OXYCODONE HYDROCHLORIDE 5 MG: 5 TABLET ORAL at 23:18

## 2023-02-09 ASSESSMENT — ACTIVITIES OF DAILY LIVING (ADL): ADLS_ACUITY_SCORE: 35

## 2023-02-10 PROBLEM — F10.939 ALCOHOL WITHDRAWAL (H): Status: ACTIVE | Noted: 2023-02-10

## 2023-02-10 LAB
ALBUMIN SERPL BCG-MCNC: 4 G/DL (ref 3.5–5.2)
ALBUMIN SERPL BCG-MCNC: 4.4 G/DL (ref 3.5–5.2)
ALP SERPL-CCNC: 103 U/L (ref 40–129)
ALP SERPL-CCNC: 82 U/L (ref 40–129)
ALT SERPL W P-5'-P-CCNC: 171 U/L (ref 10–50)
ALT SERPL W P-5'-P-CCNC: 216 U/L (ref 10–50)
ANION GAP SERPL CALCULATED.3IONS-SCNC: 13 MMOL/L (ref 7–15)
ANION GAP SERPL CALCULATED.3IONS-SCNC: 14 MMOL/L (ref 7–15)
AST SERPL W P-5'-P-CCNC: 233 U/L (ref 10–50)
AST SERPL W P-5'-P-CCNC: 330 U/L (ref 10–50)
ATRIAL RATE - MUSE: 71 BPM
BASOPHILS # BLD AUTO: 0.1 10E3/UL (ref 0–0.2)
BASOPHILS NFR BLD AUTO: 1 %
BILIRUB SERPL-MCNC: 0.3 MG/DL
BILIRUB SERPL-MCNC: 0.3 MG/DL
BUN SERPL-MCNC: 6.8 MG/DL (ref 6–20)
BUN SERPL-MCNC: 8 MG/DL (ref 6–20)
CALCIUM SERPL-MCNC: 7.9 MG/DL (ref 8.6–10)
CALCIUM SERPL-MCNC: 8.8 MG/DL (ref 8.6–10)
CHLORIDE SERPL-SCNC: 102 MMOL/L (ref 98–107)
CHLORIDE SERPL-SCNC: 103 MMOL/L (ref 98–107)
CREAT SERPL-MCNC: 0.82 MG/DL (ref 0.67–1.17)
CREAT SERPL-MCNC: 0.92 MG/DL (ref 0.67–1.17)
DEPRECATED HCO3 PLAS-SCNC: 23 MMOL/L (ref 22–29)
DEPRECATED HCO3 PLAS-SCNC: 26 MMOL/L (ref 22–29)
DIASTOLIC BLOOD PRESSURE - MUSE: NORMAL MMHG
EOSINOPHIL # BLD AUTO: 0.3 10E3/UL (ref 0–0.7)
EOSINOPHIL NFR BLD AUTO: 5 %
ERYTHROCYTE [DISTWIDTH] IN BLOOD BY AUTOMATED COUNT: 12.7 % (ref 10–15)
ETHANOL SERPL-MCNC: 0.35 G/DL
GFR SERPL CREATININE-BSD FRML MDRD: >90 ML/MIN/1.73M2
GFR SERPL CREATININE-BSD FRML MDRD: >90 ML/MIN/1.73M2
GLUCOSE SERPL-MCNC: 105 MG/DL (ref 70–99)
GLUCOSE SERPL-MCNC: 147 MG/DL (ref 70–99)
HCT VFR BLD AUTO: 41 % (ref 40–53)
HGB BLD-MCNC: 13.9 G/DL (ref 13.3–17.7)
IMM GRANULOCYTES # BLD: 0.1 10E3/UL
IMM GRANULOCYTES NFR BLD: 1 %
INTERPRETATION ECG - MUSE: NORMAL
LIPASE SERPL-CCNC: 19 U/L (ref 13–60)
LYMPHOCYTES # BLD AUTO: 3.1 10E3/UL (ref 0.8–5.3)
LYMPHOCYTES NFR BLD AUTO: 44 %
MAGNESIUM SERPL-MCNC: 2.2 MG/DL (ref 1.7–2.3)
MCH RBC QN AUTO: 32.4 PG (ref 26.5–33)
MCHC RBC AUTO-ENTMCNC: 33.9 G/DL (ref 31.5–36.5)
MCV RBC AUTO: 96 FL (ref 78–100)
MONOCYTES # BLD AUTO: 0.6 10E3/UL (ref 0–1.3)
MONOCYTES NFR BLD AUTO: 8 %
NEUTROPHILS # BLD AUTO: 2.8 10E3/UL (ref 1.6–8.3)
NEUTROPHILS NFR BLD AUTO: 41 %
NRBC # BLD AUTO: 0 10E3/UL
NRBC BLD AUTO-RTO: 0 /100
P AXIS - MUSE: 40 DEGREES
PLATELET # BLD AUTO: 193 10E3/UL (ref 150–450)
POTASSIUM SERPL-SCNC: 4.2 MMOL/L (ref 3.4–5.3)
POTASSIUM SERPL-SCNC: 4.6 MMOL/L (ref 3.4–5.3)
PR INTERVAL - MUSE: 148 MS
PROT SERPL-MCNC: 6.4 G/DL (ref 6.4–8.3)
PROT SERPL-MCNC: 7.7 G/DL (ref 6.4–8.3)
QRS DURATION - MUSE: 88 MS
QT - MUSE: 406 MS
QTC - MUSE: 441 MS
R AXIS - MUSE: 7 DEGREES
RBC # BLD AUTO: 4.29 10E6/UL (ref 4.4–5.9)
SODIUM SERPL-SCNC: 139 MMOL/L (ref 136–145)
SODIUM SERPL-SCNC: 142 MMOL/L (ref 136–145)
SYSTOLIC BLOOD PRESSURE - MUSE: NORMAL MMHG
T AXIS - MUSE: 22 DEGREES
TROPONIN T SERPL HS-MCNC: <6 NG/L
VENTRICULAR RATE- MUSE: 71 BPM
WBC # BLD AUTO: 7 10E3/UL (ref 4–11)

## 2023-02-10 PROCEDURE — 99222 1ST HOSP IP/OBS MODERATE 55: CPT | Performed by: STUDENT IN AN ORGANIZED HEALTH CARE EDUCATION/TRAINING PROGRAM

## 2023-02-10 PROCEDURE — 82077 ASSAY SPEC XCP UR&BREATH IA: CPT | Performed by: EMERGENCY MEDICINE

## 2023-02-10 PROCEDURE — 96361 HYDRATE IV INFUSION ADD-ON: CPT | Performed by: EMERGENCY MEDICINE

## 2023-02-10 PROCEDURE — 250N000011 HC RX IP 250 OP 636: Performed by: STUDENT IN AN ORGANIZED HEALTH CARE EDUCATION/TRAINING PROGRAM

## 2023-02-10 PROCEDURE — 84155 ASSAY OF PROTEIN SERUM: CPT | Performed by: STUDENT IN AN ORGANIZED HEALTH CARE EDUCATION/TRAINING PROGRAM

## 2023-02-10 PROCEDURE — 84450 TRANSFERASE (AST) (SGOT): CPT | Performed by: STUDENT IN AN ORGANIZED HEALTH CARE EDUCATION/TRAINING PROGRAM

## 2023-02-10 PROCEDURE — 83735 ASSAY OF MAGNESIUM: CPT | Performed by: EMERGENCY MEDICINE

## 2023-02-10 PROCEDURE — 80053 COMPREHEN METABOLIC PANEL: CPT | Performed by: EMERGENCY MEDICINE

## 2023-02-10 PROCEDURE — 258N000003 HC RX IP 258 OP 636: Performed by: EMERGENCY MEDICINE

## 2023-02-10 PROCEDURE — 36415 COLL VENOUS BLD VENIPUNCTURE: CPT | Performed by: EMERGENCY MEDICINE

## 2023-02-10 PROCEDURE — 84484 ASSAY OF TROPONIN QUANT: CPT | Performed by: EMERGENCY MEDICINE

## 2023-02-10 PROCEDURE — 83690 ASSAY OF LIPASE: CPT | Performed by: EMERGENCY MEDICINE

## 2023-02-10 PROCEDURE — 36415 COLL VENOUS BLD VENIPUNCTURE: CPT | Performed by: STUDENT IN AN ORGANIZED HEALTH CARE EDUCATION/TRAINING PROGRAM

## 2023-02-10 PROCEDURE — G0378 HOSPITAL OBSERVATION PER HR: HCPCS

## 2023-02-10 PROCEDURE — 250N000009 HC RX 250: Performed by: STUDENT IN AN ORGANIZED HEALTH CARE EDUCATION/TRAINING PROGRAM

## 2023-02-10 PROCEDURE — 96376 TX/PRO/DX INJ SAME DRUG ADON: CPT

## 2023-02-10 PROCEDURE — 250N000013 HC RX MED GY IP 250 OP 250 PS 637: Performed by: STUDENT IN AN ORGANIZED HEALTH CARE EDUCATION/TRAINING PROGRAM

## 2023-02-10 PROCEDURE — 85025 COMPLETE CBC W/AUTO DIFF WBC: CPT | Performed by: EMERGENCY MEDICINE

## 2023-02-10 PROCEDURE — 99207 PR APP CREDIT; MD BILLING SHARED VISIT: CPT | Performed by: PHYSICIAN ASSISTANT

## 2023-02-10 PROCEDURE — 250N000011 HC RX IP 250 OP 636: Performed by: EMERGENCY MEDICINE

## 2023-02-10 PROCEDURE — 250N000013 HC RX MED GY IP 250 OP 250 PS 637: Performed by: EMERGENCY MEDICINE

## 2023-02-10 PROCEDURE — 96374 THER/PROPH/DIAG INJ IV PUSH: CPT | Performed by: EMERGENCY MEDICINE

## 2023-02-10 RX ORDER — PROCHLORPERAZINE MALEATE 5 MG
10 TABLET ORAL EVERY 6 HOURS PRN
Status: DISCONTINUED | OUTPATIENT
Start: 2023-02-10 | End: 2023-02-12 | Stop reason: HOSPADM

## 2023-02-10 RX ORDER — PROCHLORPERAZINE 25 MG
25 SUPPOSITORY, RECTAL RECTAL EVERY 12 HOURS PRN
Status: DISCONTINUED | OUTPATIENT
Start: 2023-02-10 | End: 2023-02-12 | Stop reason: HOSPADM

## 2023-02-10 RX ORDER — LORAZEPAM 2 MG/ML
1-2 INJECTION INTRAMUSCULAR EVERY 30 MIN PRN
Status: DISCONTINUED | OUTPATIENT
Start: 2023-02-10 | End: 2023-02-12 | Stop reason: HOSPADM

## 2023-02-10 RX ORDER — LORAZEPAM 2 MG/ML
1 INJECTION INTRAMUSCULAR ONCE
Status: COMPLETED | OUTPATIENT
Start: 2023-02-10 | End: 2023-02-10

## 2023-02-10 RX ORDER — FLUTICASONE PROPIONATE 50 MCG
1 SPRAY, SUSPENSION (ML) NASAL DAILY
Status: DISCONTINUED | OUTPATIENT
Start: 2023-02-10 | End: 2023-02-12 | Stop reason: HOSPADM

## 2023-02-10 RX ORDER — FLUMAZENIL 0.1 MG/ML
0.2 INJECTION, SOLUTION INTRAVENOUS
Status: DISCONTINUED | OUTPATIENT
Start: 2023-02-10 | End: 2023-02-12 | Stop reason: HOSPADM

## 2023-02-10 RX ORDER — GABAPENTIN 600 MG/1
1200 TABLET ORAL ONCE
Status: COMPLETED | OUTPATIENT
Start: 2023-02-10 | End: 2023-02-10

## 2023-02-10 RX ORDER — GABAPENTIN 300 MG/1
600 CAPSULE ORAL EVERY 8 HOURS
Status: DISCONTINUED | OUTPATIENT
Start: 2023-02-13 | End: 2023-02-12 | Stop reason: HOSPADM

## 2023-02-10 RX ORDER — CLONIDINE HYDROCHLORIDE 0.1 MG/1
0.1 TABLET ORAL EVERY 8 HOURS
Status: DISCONTINUED | OUTPATIENT
Start: 2023-02-10 | End: 2023-02-12 | Stop reason: HOSPADM

## 2023-02-10 RX ORDER — LORAZEPAM 1 MG/1
1-2 TABLET ORAL EVERY 30 MIN PRN
Status: DISCONTINUED | OUTPATIENT
Start: 2023-02-10 | End: 2023-02-12 | Stop reason: HOSPADM

## 2023-02-10 RX ORDER — TRAZODONE HYDROCHLORIDE 50 MG/1
50 TABLET, FILM COATED ORAL
Status: DISCONTINUED | OUTPATIENT
Start: 2023-02-10 | End: 2023-02-12 | Stop reason: HOSPADM

## 2023-02-10 RX ORDER — MULTIPLE VITAMINS W/ MINERALS TAB 9MG-400MCG
1 TAB ORAL DAILY
Status: DISCONTINUED | OUTPATIENT
Start: 2023-02-10 | End: 2023-02-12 | Stop reason: HOSPADM

## 2023-02-10 RX ORDER — OLANZAPINE 5 MG/1
5-10 TABLET, ORALLY DISINTEGRATING ORAL EVERY 6 HOURS PRN
Status: DISCONTINUED | OUTPATIENT
Start: 2023-02-10 | End: 2023-02-12 | Stop reason: HOSPADM

## 2023-02-10 RX ORDER — LIDOCAINE 40 MG/G
CREAM TOPICAL
Status: DISCONTINUED | OUTPATIENT
Start: 2023-02-10 | End: 2023-02-12 | Stop reason: HOSPADM

## 2023-02-10 RX ORDER — HALOPERIDOL 5 MG/ML
2.5-5 INJECTION INTRAMUSCULAR EVERY 6 HOURS PRN
Status: DISCONTINUED | OUTPATIENT
Start: 2023-02-10 | End: 2023-02-12 | Stop reason: HOSPADM

## 2023-02-10 RX ORDER — SODIUM CHLORIDE 9 MG/ML
INJECTION, SOLUTION INTRAVENOUS CONTINUOUS
Status: DISCONTINUED | OUTPATIENT
Start: 2023-02-10 | End: 2023-02-10

## 2023-02-10 RX ORDER — FLUTICASONE PROPIONATE 50 MCG
1 SPRAY, SUSPENSION (ML) NASAL DAILY
Status: DISCONTINUED | OUTPATIENT
Start: 2023-02-10 | End: 2023-02-10

## 2023-02-10 RX ORDER — GABAPENTIN 100 MG/1
100 CAPSULE ORAL EVERY 8 HOURS
Status: DISCONTINUED | OUTPATIENT
Start: 2023-02-17 | End: 2023-02-12 | Stop reason: HOSPADM

## 2023-02-10 RX ORDER — HYDROXYZINE HYDROCHLORIDE 25 MG/1
25 TABLET, FILM COATED ORAL EVERY 4 HOURS PRN
Status: DISCONTINUED | OUTPATIENT
Start: 2023-02-10 | End: 2023-02-12 | Stop reason: HOSPADM

## 2023-02-10 RX ORDER — GABAPENTIN 300 MG/1
900 CAPSULE ORAL EVERY 8 HOURS
Status: DISCONTINUED | OUTPATIENT
Start: 2023-02-10 | End: 2023-02-12 | Stop reason: HOSPADM

## 2023-02-10 RX ORDER — ONDANSETRON 2 MG/ML
4 INJECTION INTRAMUSCULAR; INTRAVENOUS ONCE
Status: COMPLETED | OUTPATIENT
Start: 2023-02-10 | End: 2023-02-10

## 2023-02-10 RX ORDER — GABAPENTIN 300 MG/1
300 CAPSULE ORAL EVERY 8 HOURS
Status: DISCONTINUED | OUTPATIENT
Start: 2023-02-15 | End: 2023-02-12 | Stop reason: HOSPADM

## 2023-02-10 RX ORDER — OXYMETAZOLINE HYDROCHLORIDE 0.05 G/100ML
2 SPRAY NASAL 2 TIMES DAILY PRN
Status: DISCONTINUED | OUTPATIENT
Start: 2023-02-10 | End: 2023-02-12 | Stop reason: HOSPADM

## 2023-02-10 RX ORDER — ONDANSETRON 4 MG/1
4 TABLET, FILM COATED ORAL EVERY 6 HOURS PRN
Status: DISCONTINUED | OUTPATIENT
Start: 2023-02-10 | End: 2023-02-12 | Stop reason: HOSPADM

## 2023-02-10 RX ORDER — ONDANSETRON 2 MG/ML
4 INJECTION INTRAMUSCULAR; INTRAVENOUS EVERY 6 HOURS PRN
Status: DISCONTINUED | OUTPATIENT
Start: 2023-02-10 | End: 2023-02-12 | Stop reason: HOSPADM

## 2023-02-10 RX ADMIN — THIAMINE HCL TAB 100 MG 100 MG: 100 TAB at 08:58

## 2023-02-10 RX ADMIN — FLUTICASONE PROPIONATE 1 SPRAY: 50 SPRAY, METERED NASAL at 03:10

## 2023-02-10 RX ADMIN — CLONIDINE HYDROCHLORIDE 0.1 MG: 0.1 TABLET ORAL at 10:46

## 2023-02-10 RX ADMIN — LORAZEPAM 1 MG: 1 TABLET ORAL at 11:44

## 2023-02-10 RX ADMIN — MULTIPLE VITAMINS W/ MINERALS TAB 1 TABLET: TAB at 08:58

## 2023-02-10 RX ADMIN — ONDANSETRON 4 MG: 2 INJECTION INTRAMUSCULAR; INTRAVENOUS at 10:53

## 2023-02-10 RX ADMIN — SODIUM CHLORIDE 1000 ML: 9 INJECTION, SOLUTION INTRAVENOUS at 00:31

## 2023-02-10 RX ADMIN — OXYMETAZOLINE HYDROCHLORIDE 2 SPRAY: 0.05 SPRAY NASAL at 23:30

## 2023-02-10 RX ADMIN — OXYMETAZOLINE HYDROCHLORIDE 2 SPRAY: 0.05 SPRAY NASAL at 19:53

## 2023-02-10 RX ADMIN — ONDANSETRON 4 MG: 2 INJECTION INTRAMUSCULAR; INTRAVENOUS at 04:27

## 2023-02-10 RX ADMIN — GABAPENTIN 1200 MG: 600 TABLET, FILM COATED ORAL at 03:11

## 2023-02-10 RX ADMIN — HYDROXYZINE HYDROCHLORIDE 25 MG: 25 TABLET, FILM COATED ORAL at 23:30

## 2023-02-10 RX ADMIN — LORAZEPAM 2 MG: 1 TABLET ORAL at 04:27

## 2023-02-10 RX ADMIN — ONDANSETRON 4 MG: 2 INJECTION INTRAMUSCULAR; INTRAVENOUS at 00:25

## 2023-02-10 RX ADMIN — TRAZODONE HYDROCHLORIDE 50 MG: 50 TABLET ORAL at 23:24

## 2023-02-10 RX ADMIN — OXYMETAZOLINE HYDROCHLORIDE 2 SPRAY: 0.05 SPRAY NASAL at 06:40

## 2023-02-10 RX ADMIN — SODIUM CHLORIDE: 9 INJECTION, SOLUTION INTRAVENOUS at 01:06

## 2023-02-10 RX ADMIN — GABAPENTIN 900 MG: 300 CAPSULE ORAL at 10:45

## 2023-02-10 RX ADMIN — GABAPENTIN 900 MG: 300 CAPSULE ORAL at 18:38

## 2023-02-10 RX ADMIN — CLONIDINE HYDROCHLORIDE 0.1 MG: 0.1 TABLET ORAL at 03:11

## 2023-02-10 RX ADMIN — Medication 5 MG: at 23:24

## 2023-02-10 RX ADMIN — CLONIDINE HYDROCHLORIDE 0.1 MG: 0.1 TABLET ORAL at 18:39

## 2023-02-10 RX ADMIN — HYDROXYZINE HYDROCHLORIDE 25 MG: 25 TABLET, FILM COATED ORAL at 17:32

## 2023-02-10 RX ADMIN — LORAZEPAM 1 MG: 1 TABLET ORAL at 14:40

## 2023-02-10 ASSESSMENT — ACTIVITIES OF DAILY LIVING (ADL)
ADLS_ACUITY_SCORE: 37

## 2023-02-10 NOTE — ED TRIAGE NOTES
Pt arrives with his wife reports he is looking for detox from alcohol, last drink was 30 minutes ago. Pt is reporting abdominal pain for the last day, points to epigastric area. Pt also reports several hours of numbness in his legs.    Pt reports he has been drinking daily for 2 weeks, 1 L per day of vodka. Pt has been through withdrawals in the past, denies seizure history. Pt was here about 1.5 months ago in the detox unit.

## 2023-02-10 NOTE — PROGRESS NOTES
[facility]    Medicine Progress Note - Hospitalist Service, GOLD TEAM 20    Date of Admission:  2/9/2023    Assessment & Plan   Tate Crocker is a 38 year old male with a history of alcohol use disorder who presented to the ER 2/9/23 for ETOH detox. Admitted for mild alcoholic hepatitis and alcohol withdrawal.      #Transaminitis  #Mild Alcoholic Hepatitis  Elevated LFTs since at least 9/2021. ,  on admit. Improved 2/10. Lipase wnl. Asymptomatic.  - Trend LFTs in AM  - No indication for steroids at this time.   - Consider RUQ US, viral hepatitis serologies, and INR if worsening.     #Severe Alcohol Use Disorder  #Alcohol Withdrawal  Drinking ~1L daily PTA. Last drink 2300 on 2/9. No hx of withdrawal seizures. Interested in treatment.  - CIWA protocol with Lorazepam (last dose 1440 on 2/10)  - Continue Gabapentin taper and Clonidine as adjuncts for withdrawal   - Continue PTA Thiamine and MVI  - Continue PTA Hydroxyzine PRN for anxiety   - Zofran and Compazine PRN for n/v (QTc 441 this admission)  - CD consulted, but unable to see until 2/13 or 2/14. If discharges prior to then, pt can call Mental Health and Addiction Services Line: [phone] and make an appt through [facility] for an evaluation and referrals to CD treatment.    Diet: Regular    DVT Prophylaxis: Mechanical/Ambulation  Steen Catheter: Not present  Lines: None     Cardiac Monitoring: None  Code Status: Full Code      Expected Discharge: 02/11/2023 to home    JOANIE Cha  Hospitalist Service, GOLD TEAM 20  [facility]  Securely message with [email or web address] (more info)  Text page via [email or web address] Paging/Directory   See signed in provider for up to date coverage information  ___________________________________________________________________    Interval History   Current withdrawal symptoms include anxiety, heart racing sensation, tremors, and jitters. He  reports intermittent chest tightness which he attributes to anxiety; troponin <6 and EKG with NSR and no evidence of ischemia. Denies SOB, n/v/c/d, abdominal pain, or dysuria.    Physical Exam   Vital Signs: Temp: 97.4  F (36.3  C) Temp src: Oral BP: (!) 129/97 Pulse: 94   Resp: 18 SpO2: 93 % O2 Device: None (Room air)    Weight: 197 lbs 0 oz    General: Awake. Sitting up in chair. NAD.  HEENT: Anicteric sclera. MMM.  CV: RRR  Respiratory: Normal effort on RA. Lungs CTAB.  GI: Abdomen is soft, non-tender, and non-distended. Bowel sounds present.  Extremities: No peripheral edema. Warm and well perfused.  Neuro: A&O x 3. Moves all extremities.  Skin: No rashes or jaundice.    50 MINUTES SPENT BY ME on the date of service doing chart review, history, exam, documentation & further activities per the note.    Data     I have personally reviewed the following data over the past 24 hrs:    7.0  \   13.9   / 193     139 103 6.8 /  105 (H)   4.6 23 0.82 \       ALT: 171 (H) AST: 233 (H) AP: 82 TBILI: 0.3   ALB: 4.0 TOT PROTEIN: 6.4 LIPASE: 19       Trop: <6 BNP: N/A

## 2023-02-10 NOTE — ED NOTES
Pt complaining of severe abdominal pain, MD informed. Verbal order of Oxycodone 5 mg from Dr. Terrell

## 2023-02-10 NOTE — PROGRESS NOTES
Pt arrived to the unit around 1130 from the ED.      Independent in the room-- calls appropriately as needed    Scored on the CIWA-- medications given as needed.     Concerned about sleeping at night.      Continue the POC.

## 2023-02-10 NOTE — PHARMACY-ADMISSION MEDICATION HISTORY
Admission Medication History Completed by Pharmacy    See [facility] Admission Navigator for allergy information, preferred outpatient pharmacy, prior to admission medications and immunization status.     Medication History Sources:     Patient interview    Surescripts dispense report    Changes made to PTA medication list (reason):    Added: None    Deleted:   o Acamprosate  o Hydroxyzine  o MVI  o Thiamine  o Trazodone      Changed: None    Additional Information:    Patient reported he was not taking any medications PTA.    Prior to Admission medications    Not on File       Date completed: 02/10/23    Medication history completed by: Marquez Arndt RPH

## 2023-02-10 NOTE — H&P
[facility]    History and Physical - Hospitalist Service, GOLD TEAM        Date of Admission:  2/9/2023    Assessment & Plan      Tate Crocker is a 38-year-old M with a history of ETOH use disorder who presented to the ER 2/9/23 for ETOH detox, admitted for mild ETOH hepatitis and for management of ETOH withdrawal.    # Transaminitis  # ETOH hepatitis, mild  AST/ALT were 330/216. Looks like patient's LFTs have been elevated for at least the past ~6 months. Lipase within normal limits. When I saw him, Tate was having no abdominal pain or discomfort. Bilirubin within normal limits.  - s/p 1L NS in the ED, now on mIVF  - Trend LFTs  - No indication for steroids at this time     # Severe ETOH use disorder  # ETOH detox  No history of withdrawal seizures.   - CIWA protocol ordered with PRN lorazepam  - Gabapentin taper and clonidine ordered as adjuncts for withdrawal symptoms  - Continue PTA thiamine daily, multivitamin daily  - Continue PTA PRN hydroxyzine for anxiety  - PRN zofran and compazine ordered (QTc 441 this admission)     Diet: Combination Diet Regular Diet Adult  DVT Prophylaxis: Ambulate every shift  Steen Catheter: Not present  Lines: None     Cardiac Monitoring: None  Code Status: Full Code       Disposition Plan      Expected Discharge Date: 02/12/2023                  Alyssa Napoles MD  Hospitalist Service, GOLD TEAM   [facility]  Securely message with [employer] (more info)  Text page via [employer] Paging/Directory   See signed in provider for up to date coverage information    ______________________________________________________________________    Chief Complaint   ETOH withdrawal    History is obtained from the patient    History of Present Illness   Tate Crocker is a 38-year-old M with a history of ETOH use disorder who presents to the ED for detox.    Tate reports ongoing heavy ETOH use, mainly  "vodka for the past few weeks. His last drink was at approximately 2300 on 2/9/23, after which he presented to the ED due to epigastric pain/discomfort and soreness all over. Tate states that he has a breathalyzer at home and monitors his ETOH levels-- it was telling him that he was ~0.16, but when he got to our ED, he blew a 0.35. States that when his ETOH levels get high, he has crampy abdominal pain and myalgias. No vomiting. Tate is seeking detox from ETOH.    On arrival to the ED, patient was afebrile and in no acute distress. HR was in the 70s. Labs were significant for transaminitis with , . Tate received a NS fluid bolus and oxycodone for his abdominal pain, with subsequent resolution of symptoms. Per the ED, they initially tried to admit to detox unit, but detox cannot accept AST/ALT levels >300, and so patient is being admitted to medicine for ETOH detox.       Past Medical History    ETOH use disorder    Past Surgical History   No past surgical history on file.    Prior to Admission Medications   Prior to Admission Medications   Prescriptions Last Dose Informant Patient Reported? Taking?   acamprosate (CAMPRAL) 333 MG EC tablet   No No   Sig: Take 2 tablets (666 mg) by mouth 3 times daily   hydrOXYzine (ATARAX) 25 MG tablet More than a month  No Yes   Sig: Take 1 tablet (25 mg) by mouth every 4 hours as needed for anxiety   multivitamin w/minerals (THERA-VIT-M) tablet More than a month  No Yes   Sig: Take 1 tablet by mouth daily   thiamine (B-1) 100 MG tablet More than a month  No Yes   Sig: Take 1 tablet (100 mg) by mouth daily   traZODone (DESYREL) 50 MG tablet More than a month  No Yes   Sig: Take 1 tablet (50 mg) by mouth nightly as needed for sleep (may repeat after 60 minutes)      Facility-Administered Medications: None        Social History   Vapes daily-- takes \"one hit\" every 15 minutes or so while awake. Drinks vodka (up to 1L) daily. No other drug use.    Physical Exam "   Vital Signs: Temp: 97.8  F (36.6  C) Temp src: Oral BP: (!) 142/95 Pulse: 76   Resp: 20 SpO2: 100 % O2 Device: None (Room air)    Weight: 197 lbs 0 oz    GENERAL: The patient is awake and alert, in no apparent distress, appropriate, pleasant and cooperative. No dysarthria is noted. No discomfort on presentation is noted.  HEAD: Atraumatic, normocephalic. Sclerae are white without injection or icterus.  NOSE: Without deformity, bleeding or discharge.   ORAL CAVITY: Oral mucosa is pink and moist.  NECK: No signs of meningismus. No adenopathy is noted. No JVD is seen.   LUNGS: No increased work of breathing. No wheezes.  ABDOMEN: Nondistended, nontender.  EXTREMITIES: No clubbing, cyanosis or edema. No tremors.  SKIN: No rashes. No jaundice. Pink and warm with good turgor. No erythema or nodules noted.  PSYCHIATRIC: The patient is oriented x4. Mood and affect are appropriate.     Data     I have personally reviewed the following data over the past 24 hrs:    7.0  \   13.9   / 193     142 102 8.0 /  147 (H)   4.2 26 0.92 \       ALT: 216 (H) AST: 330 (H) AP: 103 TBILI: 0.3   ALB: 4.4 TOT PROTEIN: 7.7 LIPASE: 19       Trop: <6 BNP: N/A

## 2023-02-10 NOTE — ED PROVIDER NOTES
[facility] [place] ([facility])    2/09/23      ED PROVIDER NOTE  Hallway I      History     Chief Complaint   Patient presents with     Alcohol Intoxication     HPI  Tate Crocker is a 38 year old male with a past medical history of alcohol dependence who presents to the ED with his wife seeking detox. Patient states that he tends to drink about 1L of vodka per day, for the past 3-4 weeks. Last drink was 30 minutes prior to arrival. States that he has been experiencing epigastric abdominal pain and nausea due to his drinking. He also endorses feeling weak and fatigued. Patient does endorse taking oxycodone for pain management, but denies any other substance use. Tends to feel more nauseous after taking oxycodone. Denies fevers, chills, or vomiting. No history of withdrawal seizures or DT's.     Per chart review, patient has had multiple visits to the ED due to alcohol use in the past. Patient was most recently admitted to detox [facility] this past December. Patient did successfully complete detox. However within this visit, patient was given a diagnosis of Transaminitis d/t alcohol consumption. Results from this admission can be seen below.    GGT   [facility] 12/16/2022  Result Value Ref Range      (H) 0 - 75 U/L     Hepatic Panel  [facility] 12/16/2022  Result Value Ref Range     Bilirubin Total 0.8 0.2 - 1.3 mg/dL     Bilirubin Direct 0.1 0.0 - 0.2 mg/dL     Protein Total 7.5 6.8 - 8.8 g/dL     Albumin 3.3 (L) 3.4 - 5.0 g/dL     Alkaline Phosphatase 92 40 - 150 U/L      (H) 0 - 45 U/L      (H) 0 - 70 U/L     Past Medical History  Past Medical History:   Diagnosis Date     Alcohol abuse      Elevated LFTs      No past surgical history on file.  hydrOXYzine (ATARAX) 25 MG tablet  multivitamin w/minerals (THERA-VIT-M) tablet  thiamine (B-1) 100 MG tablet  traZODone (DESYREL) 50 MG tablet  acamprosate (CAMPRAL) 333 MG EC tablet      Allergies   Allergen  "Reactions     Metal [Staples]      Rash      Family History  No family history on file.  Social History   Social History     Tobacco Use     Smoking status: Every Day     Types: Vaping Device     Smokeless tobacco: Never   Substance Use Topics     Alcohol use: Yes     Comment: Pt drinks daily .50 -1 L of vodka     Drug use: No      Past medical history, past surgical history, medications, allergies, family history, and social history were reviewed with the patient. No additional pertinent items.      A medically appropriate review of systems was performed with pertinent positives and negatives noted in the HPI, and all other systems negative.    Physical Exam   BP: (!) 142/95  Pulse: 76  Temp: 97.8  F (36.6  C)  Resp: 20  Height: 177.8 cm (5' 10\")  Weight: 89.4 kg (197 lb)  SpO2: 100 %  Physical Exam  Vitals and nursing note reviewed.   Constitutional:       General: He is not in acute distress.     Appearance: He is not diaphoretic.   HENT:      Head: Atraumatic.   Eyes:      Pupils: Pupils are equal, round, and reactive to light.   Cardiovascular:      Rate and Rhythm: Normal rate and regular rhythm.      Heart sounds: Normal heart sounds.   Pulmonary:      Effort: No respiratory distress.      Breath sounds: Normal breath sounds.   Chest:      Chest wall: No tenderness.   Abdominal:      General: Bowel sounds are normal.      Palpations: Abdomen is soft.      Tenderness: There is no abdominal tenderness.   Musculoskeletal:         General: No tenderness. Normal range of motion.      Cervical back: No tenderness.      Thoracic back: No tenderness.      Lumbar back: No tenderness.   Skin:     Findings: No abrasion or laceration.   Neurological:      General: No focal deficit present.      Mental Status: He is alert and oriented to person, place, and time.           ED Course, Procedures, & Data     1:38 AM  The patient was seen and examined by Brian Schmitz   in [facility].     Procedures       ED Course Selections: "        EKG Interpretation:      Interpreted by Brian Schmitz MD  Time reviewed: per Epic  Symptoms at time of EKG: n/v   Rhythm: normal sinus   Rate: normal  Axis: normal  Ectopy: none  Conduction: normal  ST Segments/ T Waves: No ST-T wave changes  Q Waves: none  Comparison to prior: Unchanged    Clinical Impression: normal EKG                     Results for orders placed or performed during the hospital encounter of 02/09/23   Asymptomatic COVID-19 Virus (Coronavirus) by PCR Nasopharyngeal     Status: Normal    Specimen: Nasopharyngeal; Swab   Result Value Ref Range    SARS CoV2 PCR Negative Negative    Narrative    Testing was performed using the Xpert Xpress SARS-CoV-2 Assay on the Cepheid Gene-Xpert Instrument Systems. Additional information about this Emergency Use Authorization (EUA) assay can be found via the Lab Guide. This test should be ordered for the detection of SARS-CoV-2 in individuals who meet SARS-CoV-2 clinical and/or epidemiological criteria as well as from individuals without symptoms or other reasons to suspect COVID-19. Test performance for asymptomatic patients has only been established in anterior nasal swab specimens. This test is for in vitro diagnostic use under the FDA EUA for laboratories certified under CLIA to perform high complexity testing. This test has not been FDA cleared or approved. A negative result does not rule out the presence of PCR inhibitors in the specimen or target RNA concentration below the limit of detection for the assay. The possibility of a false negative should be considered if the patient's recent exposure or clinical presentation suggests COVID-19. This test was validated by the [facility]. This laboratory is certified under the Clinical Laboratory Improvement Amendments (CLIA) as qualified to perform high complexity laboratory testing.     Drug abuse screen 1 urine (ED)     Status: Normal   Result Value Ref Range     Amphetamines Urine Screen Negative Screen Negative    Barbituates Urine Screen Negative Screen Negative    Benzodiazepine Urine Screen Negative Screen Negative    Cannabinoids Urine Screen Negative Screen Negative    Cocaine Urine Screen Negative Screen Negative    Opiates Urine Screen Negative Screen Negative   Comprehensive metabolic panel     Status: Abnormal   Result Value Ref Range    Sodium 142 136 - 145 mmol/L    Potassium 4.2 3.4 - 5.3 mmol/L    Chloride 102 98 - 107 mmol/L    Carbon Dioxide (CO2) 26 22 - 29 mmol/L    Anion Gap 14 7 - 15 mmol/L    Urea Nitrogen 8.0 6.0 - 20.0 mg/dL    Creatinine 0.92 0.67 - 1.17 mg/dL    Calcium 8.8 8.6 - 10.0 mg/dL    Glucose 147 (H) 70 - 99 mg/dL    Alkaline Phosphatase 103 40 - 129 U/L     (H) 10 - 50 U/L     (H) 10 - 50 U/L    Protein Total 7.7 6.4 - 8.3 g/dL    Albumin 4.4 3.5 - 5.2 g/dL    Bilirubin Total 0.3 <=1.2 mg/dL    GFR Estimate >90 >60 mL/min/1.73m2   Troponin T, High Sensitivity     Status: Normal   Result Value Ref Range    Troponin T, High Sensitivity <6 <=22 ng/L   Magnesium     Status: Normal   Result Value Ref Range    Magnesium 2.2 1.7 - 2.3 mg/dL   Ethyl Alcohol Level     Status: Abnormal   Result Value Ref Range    Alcohol ethyl 0.35 (HH) <=0.01 g/dL   Lipase     Status: Normal   Result Value Ref Range    Lipase 19 13 - 60 U/L   CBC with platelets and differential     Status: Abnormal   Result Value Ref Range    WBC Count 7.0 4.0 - 11.0 10e3/uL    RBC Count 4.29 (L) 4.40 - 5.90 10e6/uL    Hemoglobin 13.9 13.3 - 17.7 g/dL    Hematocrit 41.0 40.0 - 53.0 %    MCV 96 78 - 100 fL    MCH 32.4 26.5 - 33.0 pg    MCHC 33.9 31.5 - 36.5 g/dL    RDW 12.7 10.0 - 15.0 %    Platelet Count 193 150 - 450 10e3/uL    % Neutrophils 41 %    % Lymphocytes 44 %    % Monocytes 8 %    % Eosinophils 5 %    % Basophils 1 %    % Immature Granulocytes 1 %    NRBCs per 100 WBC 0 <1 /100    Absolute Neutrophils 2.8 1.6 - 8.3 10e3/uL    Absolute Lymphocytes 3.1 0.8 -  5.3 10e3/uL    Absolute Monocytes 0.6 0.0 - 1.3 10e3/uL    Absolute Eosinophils 0.3 0.0 - 0.7 10e3/uL    Absolute Basophils 0.1 0.0 - 0.2 10e3/uL    Absolute Immature Granulocytes 0.1 <=0.4 10e3/uL    Absolute NRBCs 0.0 10e3/uL   EKG 12-lead, tracing only     Status: None (Preliminary result)   Result Value Ref Range    Systolic Blood Pressure  mmHg    Diastolic Blood Pressure  mmHg    Ventricular Rate 71 BPM    Atrial Rate 71 BPM    MD Interval 148 ms    QRS Duration 88 ms     ms    QTc 441 ms    P Axis 40 degrees    R AXIS 7 degrees    T Axis 22 degrees    Interpretation ECG Sinus rhythm  Normal ECG      Alcohol breath test POCT     Status: Abnormal   Result Value Ref Range    Alcohol Breath Test 0.304 (A) 0.00 - 0.01   Urine Drugs of Abuse Screen     Status: Normal    Narrative    The following orders were created for panel order Urine Drugs of Abuse Screen.  Procedure                               Abnormality         Status                     ---------                               -----------         ------                     Drug abuse screen 1 urin...[[id number]]  Normal              Final result                 Please view results for these tests on the individual orders.   CBC with platelets differential     Status: Abnormal    Narrative    The following orders were created for panel order CBC with platelets differential.  Procedure                               Abnormality         Status                     ---------                               -----------         ------                     CBC with platelets and d...[[id number]]  Abnormal            Final result                 Please view results for these tests on the individual orders.     Medications   0.9% sodium chloride BOLUS (0 mLs Intravenous Stopped 2/10/23 0102)     Followed by   sodium chloride 0.9% infusion ( Intravenous New Bag 2/10/23 0106)   LORazepam (ATIVAN) injection 1 mg (has no administration in time range)   fluticasone  (FLONASE) 50 MCG/ACT spray 1 spray (has no administration in time range)   oxyCODONE (ROXICODONE) tablet 5 mg (5 mg Oral Given 2/9/23 9266)   ondansetron (ZOFRAN) injection 4 mg (4 mg Intravenous Given 2/10/23 0025)     Labs Ordered and Resulted from Time of ED Arrival to Time of ED Departure   COMPREHENSIVE METABOLIC PANEL - Abnormal       Result Value    Sodium 142      Potassium 4.2      Chloride 102      Carbon Dioxide (CO2) 26      Anion Gap 14      Urea Nitrogen 8.0      Creatinine 0.92      Calcium 8.8      Glucose 147 (*)     Alkaline Phosphatase 103       (*)      (*)     Protein Total 7.7      Albumin 4.4      Bilirubin Total 0.3      GFR Estimate >90     ETHYL ALCOHOL LEVEL - Abnormal    Alcohol ethyl 0.35 (*)    CBC WITH PLATELETS AND DIFFERENTIAL - Abnormal    WBC Count 7.0      RBC Count 4.29 (*)     Hemoglobin 13.9      Hematocrit 41.0      MCV 96      MCH 32.4      MCHC 33.9      RDW 12.7      Platelet Count 193      % Neutrophils 41      % Lymphocytes 44      % Monocytes 8      % Eosinophils 5      % Basophils 1      % Immature Granulocytes 1      NRBCs per 100 WBC 0      Absolute Neutrophils 2.8      Absolute Lymphocytes 3.1      Absolute Monocytes 0.6      Absolute Eosinophils 0.3      Absolute Basophils 0.1      Absolute Immature Granulocytes 0.1      Absolute NRBCs 0.0     ALCOHOL BREATH TEST POCT - Abnormal    Alcohol Breath Test 0.304 (*)    COVID-19 VIRUS (CORONAVIRUS) BY PCR - Normal    SARS CoV2 PCR Negative     DRUG ABUSE SCREEN 1 URINE (ED) - Normal    Amphetamines Urine Screen Negative      Barbituates Urine Screen Negative      Benzodiazepine Urine Screen Negative      Cannabinoids Urine Screen Negative      Cocaine Urine Screen Negative      Opiates Urine Screen Negative     TROPONIN T, HIGH SENSITIVITY - Normal    Troponin T, High Sensitivity <6     MAGNESIUM - Normal    Magnesium 2.2     LIPASE - Normal    Lipase 19       No orders to display          Medical Decision  Making  The patient's presentation is strongly suggestive of a chronic illness severe exacerbation, progression, or side effect of treatment.    The patient's evaluation involved:  review of external note(s) from 1 sources (see separate area of note for details)  ordering and/or review of 3+ test(s) in this encounter (see separate area of note for details)    The patient's management involved prescription drug management (including medications given in the ED) and a decision regarding hospitalization.      Assessment & Plan      38 year old male with a past medical history of alcohol dependence who presents to the ED with his wife seeking detox. Patient states that he tends to drink about 1L of vodka per day, for the past 3-4 weeks.  Vital signs in triage notable for blood pressure elevation 142/95 but otherwise afebrile and stable including normal pulse ox 100% on room air.  IV established, labs drawn sent reviewed document epic remarkable for serum alcohol 0.35, transaminitis with AST ALT of 330/216 but otherwise normal CMP and normal CBC.  UDS and COVID-19 negative.  EKG obtained which revealed normal sinus rhythm without acute ischemic changes.  Patient is given Zofran 4 mg with adequate relief of his nausea.  He was started on MSSA protocol and given a liter normal saline IV fluid bolus.  Case discussed with behavioral intake with agreement to admit to inpatient status for alcohol withdrawal.    I have reviewed the nursing notes. I have reviewed the findings, diagnosis, plan and need for follow up with the patient.    New Prescriptions    No medications on file       Final diagnoses:   Alcohol withdrawal syndrome with complication (H)   Jodi LOUIS, am serving as a trained medical scribe to document services personally performed by Brian Schmitz MD, based on the provider's statements to me.     Brian LOUIS MD, was physically present and have reviewed and verified the accuracy of this note documented  by Jodi Constantino.      Brian Schmitz MD  [facility] [place] EMERGENCY DEPARTMENT  2/9/2023     Brian Schmitz MD  02/10/23 0228

## 2023-02-10 NOTE — UTILIZATION REVIEW
"Admission Status; Secondary Review Determination     Admission Date: 2/9/2023 10:43 PM      Under the authority of the Utilization Management Committee, the utilization review process indicated a secondary review on the above patient.  The review outcome is based on review of the medical records, discussions with staff, and applying clinical experience noted on the date of the review.          (x) Observation Status Appropriate - This patient does not meet hospital inpatient criteria and is placed in observation status. If this patient's primary payer is Medicare and was admitted as an inpatient, Condition Code 44 should be used and patient status changed to \"observation\".     RATIONALE FOR DETERMINATION   38-year-old male with a history of alcohol use disorder presented to the hospital on 2/9 with epigastric pain.  Upon presentation he had a blood alcohol level of 0.35.  He had mild transaminitis likely related to mild alcoholic hepatitis but not qualifying for steroid initiation.  He has required only 1 dose of as needed lorazepam.  Patient is being monitored for evolution of alcohol withdrawal and his liver function test.  At the time of this review the patient does not meet medical necessity for inpatient hospitalization and observation status is recommended.    The severity of illness, intensity of service provided, expected LOS and risk for adverse outcome make the care appropriate for further observation; however, doesn't meet criteria for hospital inpatient admission.  Yareli Ríos was paged and notified of this determination.        The information on this document is developed by the utilization review team in order for the business office to ensure compliance.  This only denotes the appropriateness of proper admission status and does not reflect the quality of care rendered.         The definitions of Inpatient Status and Observation Status used in making the determination above are those " provided in the CMS Coverage Manual, Chapter 1 and Chapter 6, section 70.4.      Sincerely,     Diogenes Mazariegos DO, MPH   Physician Advisor  Utilization Review  [facility]

## 2023-02-11 LAB
ALBUMIN SERPL BCG-MCNC: 3.8 G/DL (ref 3.5–5.2)
ALP SERPL-CCNC: 82 U/L (ref 40–129)
ALT SERPL W P-5'-P-CCNC: 164 U/L (ref 10–50)
ANION GAP SERPL CALCULATED.3IONS-SCNC: 8 MMOL/L (ref 7–15)
AST SERPL W P-5'-P-CCNC: 251 U/L (ref 10–50)
BILIRUB SERPL-MCNC: 0.9 MG/DL
BUN SERPL-MCNC: 9.1 MG/DL (ref 6–20)
CALCIUM SERPL-MCNC: 8.9 MG/DL (ref 8.6–10)
CHLORIDE SERPL-SCNC: 101 MMOL/L (ref 98–107)
CREAT SERPL-MCNC: 1 MG/DL (ref 0.67–1.17)
DEPRECATED HCO3 PLAS-SCNC: 28 MMOL/L (ref 22–29)
ERYTHROCYTE [DISTWIDTH] IN BLOOD BY AUTOMATED COUNT: 12.6 % (ref 10–15)
GFR SERPL CREATININE-BSD FRML MDRD: >90 ML/MIN/1.73M2
GLUCOSE SERPL-MCNC: 120 MG/DL (ref 70–99)
HCT VFR BLD AUTO: 33.6 % (ref 40–53)
HGB BLD-MCNC: 11.6 G/DL (ref 13.3–17.7)
MCH RBC QN AUTO: 33.8 PG (ref 26.5–33)
MCHC RBC AUTO-ENTMCNC: 34.5 G/DL (ref 31.5–36.5)
MCV RBC AUTO: 98 FL (ref 78–100)
PLATELET # BLD AUTO: 126 10E3/UL (ref 150–450)
POTASSIUM SERPL-SCNC: 3.7 MMOL/L (ref 3.4–5.3)
PROT SERPL-MCNC: 6.4 G/DL (ref 6.4–8.3)
RBC # BLD AUTO: 3.43 10E6/UL (ref 4.4–5.9)
SODIUM SERPL-SCNC: 137 MMOL/L (ref 136–145)
WBC # BLD AUTO: 5.8 10E3/UL (ref 4–11)

## 2023-02-11 PROCEDURE — 80053 COMPREHEN METABOLIC PANEL: CPT | Performed by: PHYSICIAN ASSISTANT

## 2023-02-11 PROCEDURE — 36415 COLL VENOUS BLD VENIPUNCTURE: CPT | Performed by: PHYSICIAN ASSISTANT

## 2023-02-11 PROCEDURE — 99232 SBSQ HOSP IP/OBS MODERATE 35: CPT | Performed by: INTERNAL MEDICINE

## 2023-02-11 PROCEDURE — G0378 HOSPITAL OBSERVATION PER HR: HCPCS

## 2023-02-11 PROCEDURE — 250N000013 HC RX MED GY IP 250 OP 250 PS 637: Performed by: STUDENT IN AN ORGANIZED HEALTH CARE EDUCATION/TRAINING PROGRAM

## 2023-02-11 PROCEDURE — 85027 COMPLETE CBC AUTOMATED: CPT | Performed by: PHYSICIAN ASSISTANT

## 2023-02-11 RX ORDER — AMOXICILLIN 250 MG
1 CAPSULE ORAL AT BEDTIME
Status: DISCONTINUED | OUTPATIENT
Start: 2023-02-11 | End: 2023-02-11

## 2023-02-11 RX ORDER — AMOXICILLIN 250 MG
1 CAPSULE ORAL
Status: DISCONTINUED | OUTPATIENT
Start: 2023-02-11 | End: 2023-02-12 | Stop reason: HOSPADM

## 2023-02-11 RX ADMIN — THIAMINE HCL TAB 100 MG 100 MG: 100 TAB at 08:22

## 2023-02-11 RX ADMIN — SENNOSIDES AND DOCUSATE SODIUM 1 TABLET: 50; 8.6 TABLET ORAL at 19:56

## 2023-02-11 RX ADMIN — HYDROXYZINE HYDROCHLORIDE 25 MG: 25 TABLET, FILM COATED ORAL at 23:59

## 2023-02-11 RX ADMIN — TRAZODONE HYDROCHLORIDE 50 MG: 50 TABLET ORAL at 23:59

## 2023-02-11 RX ADMIN — GABAPENTIN 900 MG: 300 CAPSULE ORAL at 11:31

## 2023-02-11 RX ADMIN — CLONIDINE HYDROCHLORIDE 0.1 MG: 0.1 TABLET ORAL at 11:31

## 2023-02-11 RX ADMIN — Medication 5 MG: at 23:59

## 2023-02-11 RX ADMIN — FLUTICASONE PROPIONATE 1 SPRAY: 50 SPRAY, METERED NASAL at 09:10

## 2023-02-11 RX ADMIN — CLONIDINE HYDROCHLORIDE 0.1 MG: 0.1 TABLET ORAL at 18:33

## 2023-02-11 RX ADMIN — OXYMETAZOLINE HYDROCHLORIDE 2 SPRAY: 0.05 SPRAY NASAL at 02:38

## 2023-02-11 RX ADMIN — LORAZEPAM 1 MG: 1 TABLET ORAL at 02:39

## 2023-02-11 RX ADMIN — GABAPENTIN 900 MG: 300 CAPSULE ORAL at 02:32

## 2023-02-11 RX ADMIN — CLONIDINE HYDROCHLORIDE 0.1 MG: 0.1 TABLET ORAL at 02:32

## 2023-02-11 RX ADMIN — GABAPENTIN 900 MG: 300 CAPSULE ORAL at 18:33

## 2023-02-11 RX ADMIN — MULTIPLE VITAMINS W/ MINERALS TAB 1 TABLET: TAB at 08:22

## 2023-02-11 ASSESSMENT — ACTIVITIES OF DAILY LIVING (ADL)
ADLS_ACUITY_SCORE: 37

## 2023-02-11 NOTE — PLAN OF CARE
Goal Outcome Evaluation:      Plan of Care Reviewed With: patient    Overall Patient Progress: improvingOverall Patient Progress: improving    Outcome Evaluation: Pt stated he thinks he is done with detox and would like to d/c sunday.    A/Ox4. VSS on Room Air. Denied SOB, CP, Cough, N/V, N/T, Dizziness. C/O headache off and on. Pt denied pain this shift. Regular diet, thin liquids, takes pills whole. Continent of B/B, LBM unknown per pt report. Up ad efly. Skin is intact. No dressings. L PIV SL, patent with flush. Call light within reach, able to make needs known. Appears to be sleeping during rounds. Continue POC with expected discharge on 2/12/23.    Pt would like to discharge on Sunday and contact [facility] via phone during the following week.    CIWA 3, 7, 9

## 2023-02-11 NOTE — PROGRESS NOTES
"VS: BP (!) 137/99 (BP Location: Right arm)   Pulse 68   Temp (!) 96.3  F (35.7  C) (Oral)   Resp 16   Ht 1.778 m (5' 10\")   Wt 89.4 kg (197 lb)   SpO2 98%   BMI 28.27 kg/m       O2:  on RA. Lung sounds clear. Denies chest pain and SOB.   Output: Voids spontaneously and adequately. Independent in room    Last BM: LBM 2/9 according to pt.    Activity: independent while in room    Skin: Skin intact,no skin concerns    Pain: Denied pain this shift     CMS: A&Ox4. Slight Tremors    Dressing: None    Diet: Regular.diet    LDA: PIV removed    Equipment: IV pole, FWW, gait belt, and personal belongings. Call light within reach and uses appropriately.   Plan:  chemical dependency    Additional Info: Pt. Spent half of the shift sleeping, pt ate lunch. Pt. currently on CIWA checks. 1,3,1  Possible Discharge tomorrow. NO acute changes this shift. Continue POC.        "

## 2023-02-11 NOTE — PROGRESS NOTES
"[facility]    Medicine Progress Note - Hospitalist Service, GOLD TEAM 16    Date of Admission:  2/9/2023    Assessment & Plan   Tate Crocker is a 38 year old male with a history of alcohol use disorder who presented to the ER 2/9/23 for ETOH detox. Admitted for mild alcoholic hepatitis and alcohol withdrawal.      #Transaminitis  #Mild Alcoholic Hepatitis  Elevated LFTs since at least 9/2021. ,  on admit. Improved 2/10. Lipase wnl. Asymptomatic.  Labs on February 11 to be collected   Consider RUQ US, viral hepatitis serologies, and INR if worsening.     #Severe Alcohol Use Disorder  #Alcohol Withdrawal  Drinking ~1L daily PTA. Last drink 2300 on 2/9. No hx of withdrawal seizures. Interested in treatment.  - CIWA protocol with Lorazepam  - Continue Gabapentin taper and Clonidine as adjuncts for withdrawal   - Continue PTA Thiamine and MVI  - Continue PTA Hydroxyzine PRN for anxiety   - Zofran and Compazine PRN for n/v (QTc 441 this admission)  - CD consulted, but unable to see until 2/13 or 2/14. If discharges prior to then, pt can call Mental Health and Addiction Services Line: [phone] and make an appt through [facility] for an evaluation and referrals to CD treatment.      ___________________________________________________________________         Diet: Combination Diet Regular Diet Adult    DVT Prophylaxis: Ambulate every shift  Steen Catheter: Not present  Lines: None     Cardiac Monitoring: None  Code Status: Full Code      Clinically Significant Risk Factors Present on Admission          # Hypocalcemia: Lowest Ca = 7.9 mg/dL in last 2 days, will monitor and replace as appropriate              # Overweight: Estimated body mass index is 28.27 kg/m  as calculated from the following:    Height as of this encounter: 1.778 m (5' 10\").    Weight as of this encounter: 89.4 kg (197 lb).           Disposition Plan     Expected Discharge Date: " 02/11/2023                  Krissy Olmedo MD  Hospitalist Service, GOLD TEAM 16  [facility]  Securely message with [employer] (more info)  Text page via [employer] Paging/Directory   See signed in provider for up to date coverage information  ______________________________________________________________________    Interval History   No new symptoms reported per nursing staff .  Last night notes reviewed .  No chest pain or Shortness of breath reported.  No vomiting   No difficulty with voiding   Passing gas .    4 system ROS reviewed .    Physical Exam   Vital Signs: Temp: (!) 95.6  F (35.3  C) Temp src: Oral BP: 131/80 Pulse: 70   Resp: 16 SpO2: 99 % O2 Device: None (Room air)    Weight: 197 lbs 0 oz    General Appearance: was sleeping when I entered the room  Respiratory: bilaterally clear breath sounds with no rails or rhonchi  Cardiovascular: regular rate and rhythm no murmurs rubs or gallops  GI: soft nontender abdomen bowel sounds a positive  Skin: no rational jaundice  Other: pleasant mood    Medical Decision Making       36 MINUTES SPENT BY ME on the date of service doing chart review, history, exam, documentation & further activities per the note.   discuss with  charge nurse and bedside rn      Data

## 2023-02-12 VITALS
HEART RATE: 96 BPM | WEIGHT: 197 LBS | SYSTOLIC BLOOD PRESSURE: 115 MMHG | HEIGHT: 70 IN | RESPIRATION RATE: 16 BRPM | TEMPERATURE: 95.8 F | DIASTOLIC BLOOD PRESSURE: 82 MMHG | BODY MASS INDEX: 28.2 KG/M2 | OXYGEN SATURATION: 100 %

## 2023-02-12 PROCEDURE — 250N000013 HC RX MED GY IP 250 OP 250 PS 637: Performed by: STUDENT IN AN ORGANIZED HEALTH CARE EDUCATION/TRAINING PROGRAM

## 2023-02-12 PROCEDURE — G0378 HOSPITAL OBSERVATION PER HR: HCPCS

## 2023-02-12 PROCEDURE — 99239 HOSP IP/OBS DSCHRG MGMT >30: CPT | Performed by: INTERNAL MEDICINE

## 2023-02-12 RX ORDER — GABAPENTIN 300 MG/1
300 CAPSULE ORAL EVERY 8 HOURS
Qty: 90 CAPSULE | Refills: 0 | Status: SHIPPED | OUTPATIENT
Start: 2023-02-15

## 2023-02-12 RX ORDER — MULTIPLE VITAMINS W/ MINERALS TAB 9MG-400MCG
1 TAB ORAL DAILY
Qty: 30 TABLET | Refills: 0 | Status: SHIPPED | OUTPATIENT
Start: 2023-02-12

## 2023-02-12 RX ORDER — FOLIC ACID 1 MG/1
1 TABLET ORAL DAILY
Qty: 30 TABLET | Refills: 0 | Status: SHIPPED | OUTPATIENT
Start: 2023-02-12

## 2023-02-12 RX ORDER — LANOLIN ALCOHOL/MO/W.PET/CERES
100 CREAM (GRAM) TOPICAL DAILY
Qty: 30 TABLET | Refills: 0 | Status: SHIPPED | OUTPATIENT
Start: 2023-02-12

## 2023-02-12 RX ADMIN — THIAMINE HCL TAB 100 MG 100 MG: 100 TAB at 08:28

## 2023-02-12 RX ADMIN — FLUTICASONE PROPIONATE 1 SPRAY: 50 SPRAY, METERED NASAL at 08:28

## 2023-02-12 RX ADMIN — CLONIDINE HYDROCHLORIDE 0.1 MG: 0.1 TABLET ORAL at 02:22

## 2023-02-12 RX ADMIN — MULTIPLE VITAMINS W/ MINERALS TAB 1 TABLET: TAB at 08:28

## 2023-02-12 RX ADMIN — GABAPENTIN 900 MG: 300 CAPSULE ORAL at 02:22

## 2023-02-12 ASSESSMENT — ACTIVITIES OF DAILY LIVING (ADL)
ADLS_ACUITY_SCORE: 37

## 2023-02-12 NOTE — PLAN OF CARE
Pt. A&Ox4. CIWA 1-2. VSS. Afebrile. Lung sounds CTA. Maintaining sats on RA. IS encouraged. Bowel sounds active, LBM 2/9. Senna added 2/11 per pt request. CMS and neuro's are intact. Denies numbness and tingling in all extremities. Denies nausea, shortness of breath, and chest pain. Denies pain. Voids spontaneously without difficulty in the BR. Tolerating regular diet. Visible skin appears intact. Pt up independently in room. No IV access. Call light is within reach, pt able to make needs known and is resting comfortably between cares. Will continue to monitor.  Plan is to discharge Sunday and follow up with chem dep.

## 2023-02-12 NOTE — PLAN OF CARE
A&Ox4. Denies chest pain, SOB, numbness tingling. CIWA score 1. C/o mild headache. Visible skin intact. Independent. LBM 2/9, voids without difficulty in bathroom. Discharging to home today.     Pt. discharged at 1100 to home, and left with personal belongings. Pt. received complete discharge paperwork and medications as filled by discharge pharmacy. Pt. was given times of last dose for all discharge medications in writing on discharge medication sheets. Pt. to follow up with chemical dependency. Pt. had no further questions at the time of discharge and no unmet needs were identified.

## 2023-02-12 NOTE — DISCHARGE SUMMARY
[facility]  Hospitalist Discharge Summary      Date of Admission:  2/9/2023  Date of Discharge:  No discharge date for patient encounter.  Discharging Provider: Krissy Olmedo MD  Discharge Service: Hospitalist Service, GOLD TEAM 16    Discharge Diagnoses   alcohol abuse disorder severe  at risk of alcohol withdrawal  abnormal liver biochemistry  Alcoholic hepatitis            Hospital Course   Tate Crocker is a 38 year old male with a history of alcohol use disorder who presented to the ER 2/9/23 for ETOH detox. Admitted for mild alcoholic hepatitis and alcohol withdrawal.      #Transaminitis  #Mild Alcoholic Hepatitis  Elevated LFTs since at least 9/2021. ,  on admit. Improved 2/10. Lipase wnl. Asymptomatic.  Discussed with patient about abstaining from alcohol      #Severe Alcohol Use Disorder  #Alcohol Withdrawal  Drinking ~1L daily PTA. Last drink 2300 on 2/9. No hx of withdrawal seizures. Interested in treatment.  - CIWA protocol with Lorazepam  Was given clonidine and gabpentin in hospital and lorazepam for alcohol with drawl    (QTc 441 this admission).  - CD consulted, but unable to see until 2/13 or 2/14. If discharges prior to then, pt can call Mental Health and Addiction Services Line: [phone] and make an appt through [facility] for an evaluation and referrals to CD treatment.      ___________________________________________________________________        Consultations This Hospital Stay   CHEMICAL DEPENDENCY IP CONSULT  CARE MANAGEMENT / SOCIAL WORK IP CONSULT    Code Status   Full Code    Time Spent on this Encounter   I, Krissy Olmedo MD, personally saw the patient today and spent greater than 30 minutes discharging this patient.       Krissy Olmedo MD  [facility] MED SURG ORTHOPEDIC  [address] [place] [id number]  Phone: [phone]  Fax:  [phone]  ______________________________________________________________________    Physical Exam   Vital Signs: Temp: (!) 96.3  F (35.7  C) Temp src: Oral BP: (!) 134/94 Pulse: 71   Resp: 16 SpO2: 97 % O2 Device: None (Room air)    Weight: 197 lbs 0 oz  General Appearance: Alert and oriented sitting comfortably in bed bedside RN besides him  Respiratory: bilaterally clear with no rails or rhonchi  Cardiovascular: regular rate and rhythm normal S1 and S2  GI: soft abdomen nontender bowel sounds positive  Skin: no rashes jaundice  Other: appropriate mood and affect       Primary Care Physician   Physician No Ref-Primary    Discharge Orders      Reason for your hospital stay    You were admitted for alcohol withdrawal . You should abstain form any alcohol intake     Activity    Your activity upon discharge: activity as tolerated     Adult [facility] Follow-up and recommended labs and tests    Follow up with primary care provider, Physician No Ref-Primary, within 7 days for hospital follow- up.  The following labs/tests are recommended: cmp  You can call Mental Health and Addiction Services Line: [phone] and make an appt through [facility] for an evaluation and referrals to CD treatment..      Appointments on [place] and/or [facility] (with [facility] or [facility] provider or service). Call [phone] if you haven't heard regarding these appointments within 7 days of discharge.     Diet    Follow this diet upon discharge: Orders Placed This Encounter      Combination Diet Regular Diet Adult       Significant Results and Procedures   Most Recent 3 CBC's:  Recent Labs   Lab Test 02/11/23  1003 02/10/23  0026 12/14/22  2340   WBC 5.8 7.0 4.9   HGB 11.6* 13.9 14.6   MCV 98 96 94   * 193 206     Most Recent 3 BMP's:  Recent Labs   Lab Test 02/11/23  1003 02/10/23  0638 02/10/23  0026    139 142   POTASSIUM 3.7 4.6 4.2   CHLORIDE 101 103 102   CO2 28 23 26   BUN 9.1 6.8 8.0   CR 1.00 0.82 0.92    ANIONGAP 8 13 14   SUSANNAH 8.9 7.9* 8.8   * 105* 147*     Most Recent 2 LFT's:  Recent Labs   Lab Test 02/11/23  1003 02/10/23  0638   * 233*   * 171*   ALKPHOS 82 82   BILITOTAL 0.9 0.3       Discharge Medications   Current Discharge Medication List      START taking these medications    Details   folic acid (FOLVITE) 1 MG tablet Take 1 tablet (1 mg) by mouth daily  Qty: 30 tablet, Refills: 0    Associated Diagnoses: Alcohol withdrawal syndrome without complication (H)      gabapentin (NEURONTIN) 300 MG capsule Take 1 capsule (300 mg) by mouth every 8 hours  Qty: 90 capsule, Refills: 0    Associated Diagnoses: Alcohol withdrawal syndrome without complication (H)         CONTINUE these medications which have CHANGED    Details   multivitamin w/minerals (THERA-VIT-M) tablet Take 1 tablet by mouth daily  Qty: 30 tablet, Refills: 0    Associated Diagnoses: Alcohol withdrawal syndrome without complication (H)      thiamine (B-1) 100 MG tablet Take 1 tablet (100 mg) by mouth daily  Qty: 30 tablet, Refills: 0    Associated Diagnoses: Alcohol withdrawal syndrome without complication (H)         STOP taking these medications       hydrOXYzine (ATARAX) 25 MG tablet Comments:   Reason for Stopping:         traZODone (DESYREL) 50 MG tablet Comments:   Reason for Stopping:             Allergies   Allergies   Allergen Reactions     Metal [Staples]      Rash

## 2023-02-12 NOTE — CONSULTS
Care Management Follow Up    Writer met with patient to offer resources related to chemical dependency and to see if there were any other services or assistance that he needed. Patient reported that he did not need/want anything. He was waiting for wife to pick him up for discharge.    OSMANY Bolton, MSW  Adult Acute Care Float   Pager [phone]

## 2023-02-14 ENCOUNTER — PATIENT OUTREACH (OUTPATIENT)
Dept: CARE COORDINATION | Facility: CLINIC | Age: 38
End: 2023-02-14

## 2023-02-14 NOTE — PROGRESS NOTES
Connected Care Resource Center Contact  [facility]/Voicemail     Clinical Data: Transitional Care Management Outreach     Outreach attempted x 2.  Left message on patient's voicemail, providing [facility]'s 24/7 scheduling and nurse triage phone number [phone] ([phone]) for questions/concerns and/or to schedule an appt with an [facility] provider, if they do not have a PCP.      Plan:  General acute hospital will do no further outreaches at this time.       FREDERICK Mijares  Connected Care Resource [place], [facility]    *Connected Care Resource Team does NOT follow patient ongoing. Referrals are identified based on internal discharge reports and the outreach is to ensure patient has an understanding of their discharge instructions.